# Patient Record
Sex: FEMALE | Race: WHITE | NOT HISPANIC OR LATINO | Employment: OTHER | URBAN - METROPOLITAN AREA
[De-identification: names, ages, dates, MRNs, and addresses within clinical notes are randomized per-mention and may not be internally consistent; named-entity substitution may affect disease eponyms.]

---

## 2017-04-04 ENCOUNTER — HOSPITAL ENCOUNTER (OUTPATIENT)
Dept: RADIOLOGY | Facility: MEDICAL CENTER | Age: 72
End: 2017-04-04
Attending: NEUROLOGICAL SURGERY
Payer: MEDICARE

## 2017-04-04 VITALS
OXYGEN SATURATION: 97 % | HEART RATE: 62 BPM | RESPIRATION RATE: 16 BRPM | DIASTOLIC BLOOD PRESSURE: 65 MMHG | HEIGHT: 66 IN | WEIGHT: 245 LBS | SYSTOLIC BLOOD PRESSURE: 145 MMHG | TEMPERATURE: 97.7 F | BODY MASS INDEX: 39.37 KG/M2

## 2017-04-04 DIAGNOSIS — M54.16 LUMBAR RADICULOPATHY: ICD-10-CM

## 2017-04-04 LAB — GLUCOSE BLD-MCNC: 121 MG/DL (ref 65–99)

## 2017-04-04 PROCEDURE — 72158 MRI LUMBAR SPINE W/O & W/DYE: CPT

## 2017-04-04 PROCEDURE — 82962 GLUCOSE BLOOD TEST: CPT

## 2017-04-04 PROCEDURE — 72110 X-RAY EXAM L-2 SPINE 4/>VWS: CPT

## 2017-04-04 PROCEDURE — 700101 HCHG RX REV CODE 250

## 2017-04-04 PROCEDURE — A9577 INJ MULTIHANCE: HCPCS | Performed by: NEUROLOGICAL SURGERY

## 2017-04-04 PROCEDURE — 700117 HCHG RX CONTRAST REV CODE 255: Performed by: NEUROLOGICAL SURGERY

## 2017-04-04 PROCEDURE — 700111 HCHG RX REV CODE 636 W/ 250 OVERRIDE (IP)

## 2017-04-04 RX ADMIN — GADOBENATE DIMEGLUMINE 10 ML: 529 INJECTION, SOLUTION INTRAVENOUS at 09:45

## 2017-04-04 ASSESSMENT — PAIN SCALES - GENERAL
PAINLEVEL_OUTOF10: 0

## 2017-04-04 NOTE — IP AVS SNAPSHOT
" <p align=\"LEFT\"><IMG SRC=\"[email or web address]\" alt=\"Image\" WIDTH=\"50%\" HEIGHT=\"200\" BORDER=\"\"></p>      `           Germaine Chaparro   MRN: [id number]    Department:  [address]   Date of Visit:              `  Discharge Instructions       MRI ADULT DISCHARGE INSTRUCTIONS    You have been medicated today for your scan. Please follow the instructions below to ensure your safe recovery. If you have any questions or problems, feel free to call us at [phone] or [phone].     1.   Have someone stay with you to assist you as needed.    2.   Do not drive or operate any mechanical devices.    3.   Do not perform any activity that requires concentration. Make no major decisions over the next 24 hours.     4.   Be careful changing positions from laying down to sitting or standing, as you may become dizzy.     5.   Do not drink alcohol for 48 hours.    6.   There are no restrictions for eating your normal meals. Drink fluids.    7.   You may continue your usual medications for pain, tranquilizers, muscle relaxants or sedatives when awake.     8.   Tomorrow, you may continue your normal daily activities.     9.   Pressure dressing on 10 - 15 minutes. If swelling or bleeding occurs when removed, continue placing direct pressure on injection site for another 5 minutes, or until bleeding stops.     I have been informed of and understand the above discharge instructions.     Midazolam (VERSED)  What is this medicine?  You were given MIDAZOLAM (CINTHYA parker) for your procedure today. This medication is a benzodiazepine. It is used to cause relaxation or sleep before surgery and to block the memory of the procedure.  This medicine may be used for other purposes; ask your health care provider or pharmacist if you have questions.  What side effects may I notice from receiving this medicine?  Side effects that you should report to your doctor or health care professional as soon as " possible:  • allergic reactions like skin rash, itching or hives, swelling of the face, lips, or tongue  • breathing problems  • confusion  • dizziness or lightheadedness  • fast, irregular heartbeat  • halluninations during recovery  • numbness or tingling in the hands or feet  • pain, redness, or swelling at site where injected  • seizures  Side effects that usually do not require medical attention (report to your doctor or health care professional if they continue or are bothersome):  • coughing  • headache  • hiccups  • involuntary eye and muscle movements  • loss of memory of events just before, during, and after use  • nausea, vomiting  • speech problems  • tiredness  • trouble sleeping or nightmares  This list may not describe all possible side effects. Call your doctor for medical advice about side effects. You may report side effects to FDA at 4-217-FDA-9620.    Fentanyl  What is this medicine?  You were given FENTANYL (FEN ta nil) for your procedure today, it is a pain reliever. It is used to treat breakthrough pain that your long acting pain medicine does not control. Do not use this medicine for a pain that will go away in a few days like pain from surgery, doctor or dentist visits.   This medicine may be used for other purposes; ask your health care provider or pharmacist if you have questions.  What side effects may I notice from receiving this medicine?  Side effects that you should report to your doctor or health care professional as soon as possible:  • allergic reactions like skin rash, itching or hives, swelling of the face, lips, or tongue  • breathing problems  • changes in vision  • confusion  • dry mouth  • feeling faint, lightheaded  • hallucination  • irregular heartbeat  • mouth pain, sores  • problems with balance, talking, walking  • trouble passing urine or change in the amount of urine  • unusual bleeding or bruising  • unusually weak or tired  Side effects that usually do not require  medical attention (report to your doctor or health care professional if they continue or are bothersome):  • dizzy  • headache  • loss of appetite  • nausea, vomiting  • sweating  • tingling in mouth  This list may not describe all possible side effects. Call your doctor for medical advice about side effects. You may report side effects to FDA at 5-233-PVX-9982.       `       Diet / Nutrition:    Follow any diet instructions given to you by your doctor or the dietician, including how much salt (sodium) you are allowed each day.    If you are overweight, talk to your doctor about a weight reduction plan.    Activity:    Remain physically active following your doctor's instructions about exercise and activity.    Rest often.     Any time you become even a little tired or short of breath, SIT DOWN and rest.    Worsening Symptoms:    Report any of the following signs and symptoms to the doctor's office immediately:    *Pain of jaw, arm, or neck  *Chest pain not relieved by medication                               *Dizziness or loss of consciousness  *Difficulty breathing even when at rest   *More tired than usual                                       *Bleeding drainage or swelling of surgical site  *Swelling of feet, ankles, legs or stomach                 *Fever (>100ºF)  *Pink or blood tinged sputum  *Weight gain (3lbs/day or 5lbs /week)           *Shock from internal defibrillator (if applicable)  *Palpitations or irregular heartbeats                *Cool and/or numb extremities    Stroke Awareness    Common Risk Factors for Stroke include:    Age  Atrial Fibrillation  Carotid Artery Stenosis  Diabetes Mellitus  Excessive alcohol consumption  High blood pressure  Overweight   Physical inactivity  Smoking    Warning signs and symptoms of a stroke include:    *Sudden numbness or weakness of the face, arm or leg (especially on one side of the body).  *Sudden confusion, trouble speaking or understanding.  *Sudden trouble  seeing in one or both eyes.  *Sudden trouble walking, dizziness, loss of balance or coordination.Sudden severe headache with no known cause.    It is very important to get treatment quickly when a stroke occurs. If you experience any of the above warning signs, call 911 immediately.                    `     Quit Smoking / Tobacco Use:    I understand the use of any tobacco products increases my chance of suffering from future heart disease or stroke and could cause other illnesses which may shorten my life. Quitting the use of tobacco products is the single most important thing I can do to improve my health. For further information on smoking / tobacco cessation call a Toll Free Quit Line at 1-999.300.1497 (*[employer] [place]) or 1-362.787.6298 ([employer]) or you can access the web based program at www.lunggriddig.org.    [place] Tobacco Users Help Line:  (472) 178-7976       Toll Free: 1-585.894.1659  Quit Tobacco Program [facility] (581)232-8553    Crisis Hotline:    [place] Crisis Hotline:  6-261-ZLDHCQV or 1-703.897.8393    [place] Crisis Hotline:    1-357.372.3977 or 019-268-5887    Discharge Survey:   Thank you for choosing [facility]. We hope we did everything we could to make your hospital stay a pleasant one. You may be receiving a phone survey and we would appreciate your time and participation in answering the questions. Your input is very valuable to us in our efforts to improve our service to our patients and their families.        My signature on this form indicates that:    1. I have reviewed and understand the above information.  2. My questions regarding this information have been answered to my satisfaction.  3. I have formulated a plan with my discharge nurse to obtain my prescribed medications for home.                   `           Patient or Caregiver Signature:  ____________________________________________________________    Date:   ____________________________________________________________       `

## 2017-04-04 NOTE — DISCHARGE INSTRUCTIONS
MRI ADULT DISCHARGE INSTRUCTIONS    You have been medicated today for your scan. Please follow the instructions below to ensure your safe recovery. If you have any questions or problems, feel free to call us at [phone] or [phone].     1.   Have someone stay with you to assist you as needed.    2.   Do not drive or operate any mechanical devices.    3.   Do not perform any activity that requires concentration. Make no major decisions over the next 24 hours.     4.   Be careful changing positions from laying down to sitting or standing, as you may become dizzy.     5.   Do not drink alcohol for 48 hours.    6.   There are no restrictions for eating your normal meals. Drink fluids.    7.   You may continue your usual medications for pain, tranquilizers, muscle relaxants or sedatives when awake.     8.   Tomorrow, you may continue your normal daily activities.     9.   Pressure dressing on 10 - 15 minutes. If swelling or bleeding occurs when removed, continue placing direct pressure on injection site for another 5 minutes, or until bleeding stops.     I have been informed of and understand the above discharge instructions.     Midazolam (VERSED)  What is this medicine?  You were given MIDAZOLAM (CINTHYA parker) for your procedure today. This medication is a benzodiazepine. It is used to cause relaxation or sleep before surgery and to block the memory of the procedure.  This medicine may be used for other purposes; ask your health care provider or pharmacist if you have questions.  What side effects may I notice from receiving this medicine?  Side effects that you should report to your doctor or health care professional as soon as possible:  • allergic reactions like skin rash, itching or hives, swelling of the face, lips, or tongue  • breathing problems  • confusion  • dizziness or lightheadedness  • fast, irregular heartbeat  • halluninations during recovery  • numbness or tingling in the hands or feet  • pain,  redness, or swelling at site where injected  • seizures  Side effects that usually do not require medical attention (report to your doctor or health care professional if they continue or are bothersome):  • coughing  • headache  • hiccups  • involuntary eye and muscle movements  • loss of memory of events just before, during, and after use  • nausea, vomiting  • speech problems  • tiredness  • trouble sleeping or nightmares  This list may not describe all possible side effects. Call your doctor for medical advice about side effects. You may report side effects to FDA at 0-235-HYQ-2011.    Fentanyl  What is this medicine?  You were given FENTANYL (FEN ta nil) for your procedure today, it is a pain reliever. It is used to treat breakthrough pain that your long acting pain medicine does not control. Do not use this medicine for a pain that will go away in a few days like pain from surgery, doctor or dentist visits.   This medicine may be used for other purposes; ask your health care provider or pharmacist if you have questions.  What side effects may I notice from receiving this medicine?  Side effects that you should report to your doctor or health care professional as soon as possible:  • allergic reactions like skin rash, itching or hives, swelling of the face, lips, or tongue  • breathing problems  • changes in vision  • confusion  • dry mouth  • feeling faint, lightheaded  • hallucination  • irregular heartbeat  • mouth pain, sores  • problems with balance, talking, walking  • trouble passing urine or change in the amount of urine  • unusual bleeding or bruising  • unusually weak or tired  Side effects that usually do not require medical attention (report to your doctor or health care professional if they continue or are bothersome):  • dizzy  • headache  • loss of appetite  • nausea, vomiting  • sweating  • tingling in mouth  This list may not describe all possible side effects. Call your doctor for medical  advice about side effects. You may report side effects to FDA at 8-509-EVZ-0051.

## 2018-09-27 ENCOUNTER — APPOINTMENT (RX ONLY)
Dept: URBAN - NONMETROPOLITAN AREA CLINIC 1 | Facility: CLINIC | Age: 73
Setting detail: DERMATOLOGY
End: 2018-09-27

## 2018-09-27 DIAGNOSIS — L82.1 OTHER SEBORRHEIC KERATOSIS: ICD-10-CM

## 2018-09-27 DIAGNOSIS — L82.0 INFLAMED SEBORRHEIC KERATOSIS: ICD-10-CM

## 2018-09-27 DIAGNOSIS — Z85.828 PERSONAL HISTORY OF OTHER MALIGNANT NEOPLASM OF SKIN: ICD-10-CM

## 2018-09-27 PROBLEM — E78.5 HYPERLIPIDEMIA, UNSPECIFIED: Status: ACTIVE | Noted: 2018-09-27

## 2018-09-27 PROBLEM — E13.9 OTHER SPECIFIED DIABETES MELLITUS WITHOUT COMPLICATIONS: Status: ACTIVE | Noted: 2018-09-27

## 2018-09-27 PROBLEM — L70.0 ACNE VULGARIS: Status: ACTIVE | Noted: 2018-09-27

## 2018-09-27 PROBLEM — M12.9 ARTHROPATHY, UNSPECIFIED: Status: ACTIVE | Noted: 2018-09-27

## 2018-09-27 PROBLEM — L55.1 SUNBURN OF SECOND DEGREE: Status: ACTIVE | Noted: 2018-09-27

## 2018-09-27 PROBLEM — J45.909 UNSPECIFIED ASTHMA, UNCOMPLICATED: Status: ACTIVE | Noted: 2018-09-27

## 2018-09-27 PROBLEM — H91.90 UNSPECIFIED HEARING LOSS, UNSPECIFIED EAR: Status: ACTIVE | Noted: 2018-09-27

## 2018-09-27 PROBLEM — L29.8 OTHER PRURITUS: Status: ACTIVE | Noted: 2018-09-27

## 2018-09-27 PROBLEM — I10 ESSENTIAL (PRIMARY) HYPERTENSION: Status: ACTIVE | Noted: 2018-09-27

## 2018-09-27 PROCEDURE — ? COUNSELING

## 2018-09-27 PROCEDURE — 99202 OFFICE O/P NEW SF 15 MIN: CPT | Mod: 25

## 2018-09-27 PROCEDURE — ? LIQUID NITROGEN

## 2018-09-27 PROCEDURE — ? VITAMIN B3 COUNSELING

## 2018-09-27 PROCEDURE — 17110 DESTRUCTION B9 LES UP TO 14: CPT

## 2018-09-27 ASSESSMENT — LOCATION DETAILED DESCRIPTION DERM
LOCATION DETAILED: NASAL SUPRATIP
LOCATION DETAILED: INFERIOR THORACIC SPINE
LOCATION DETAILED: RIGHT MID-UPPER BACK
LOCATION DETAILED: LEFT MEDIAL UPPER BACK
LOCATION DETAILED: EPIGASTRIC SKIN
LOCATION DETAILED: LEFT INFERIOR MEDIAL UPPER BACK
LOCATION DETAILED: LEFT SUPERIOR MEDIAL UPPER BACK
LOCATION DETAILED: LEFT SUPERIOR UPPER BACK
LOCATION DETAILED: LEFT MID-UPPER BACK
LOCATION DETAILED: RIGHT INFERIOR UPPER BACK

## 2018-09-27 ASSESSMENT — LOCATION SIMPLE DESCRIPTION DERM
LOCATION SIMPLE: NOSE
LOCATION SIMPLE: LEFT UPPER BACK
LOCATION SIMPLE: ABDOMEN
LOCATION SIMPLE: RIGHT UPPER BACK
LOCATION SIMPLE: UPPER BACK

## 2018-09-27 ASSESSMENT — LOCATION ZONE DERM
LOCATION ZONE: NOSE
LOCATION ZONE: TRUNK

## 2018-09-27 NOTE — PROCEDURE: VITAMIN B3 COUNSELING
Detail Level: Zone
Counseling Text: I recommended taking nicotinamide or niacinamide, also know as vitamin B3, twice daily. Recent evidence suggests that taking vitamin B3 (500 mg twice daily) can reduce the risk of actinic keratoses and non-melanoma skin cancers. Side effects of vitamin B3 include flushing and headache.

## 2018-09-27 NOTE — PROCEDURE: LIQUID NITROGEN
Number Of Freeze-Thaw Cycles: 2 freeze-thaw cycles
Add 52 Modifier (Optional): no
Medical Necessity Information: It is in your best interest to select a reason for this procedure from the list below. All of these items fulfill various CMS LCD requirements except the new and changing color options.
Detail Level: Simple
Post-Care Instructions: I reviewed with the patient in detail post-care instructions. Patient is to wear sunprotection, and avoid picking at any of the treated lesions. Pt may apply Vaseline to crusted or scabbing areas.
Include Z78.9 (Other Specified Conditions Influencing Health Status) As An Associated Diagnosis?: Yes
Consent: The patient's consent was obtained including but not limited to risks of crusting, scabbing, blistering, scarring, darker or lighter pigmentary change, recurrence, incomplete removal and infection.
Medical Necessity Clause: This procedure was medically necessary because the lesions that were treated were:

## 2018-10-03 ENCOUNTER — HOSPITAL ENCOUNTER (OUTPATIENT)
Dept: RADIOLOGY | Facility: MEDICAL CENTER | Age: 73
End: 2018-10-03
Attending: NURSE PRACTITIONER
Payer: MEDICARE

## 2018-10-03 VITALS
BODY MASS INDEX: 41.78 KG/M2 | TEMPERATURE: 97.6 F | HEIGHT: 66 IN | RESPIRATION RATE: 16 BRPM | HEART RATE: 68 BPM | SYSTOLIC BLOOD PRESSURE: 142 MMHG | DIASTOLIC BLOOD PRESSURE: 68 MMHG | WEIGHT: 260 LBS | OXYGEN SATURATION: 94 %

## 2018-10-03 DIAGNOSIS — M54.16 LUMBAR RADICULOPATHY: ICD-10-CM

## 2018-10-03 LAB — GLUCOSE BLD-MCNC: 125 MG/DL (ref 65–99)

## 2018-10-03 PROCEDURE — 01922 ANES N-INVAS IMG/RADJ THER: CPT

## 2018-10-03 PROCEDURE — 700101 HCHG RX REV CODE 250

## 2018-10-03 PROCEDURE — 82962 GLUCOSE BLOOD TEST: CPT

## 2018-10-03 PROCEDURE — 700111 HCHG RX REV CODE 636 W/ 250 OVERRIDE (IP)

## 2018-10-03 PROCEDURE — 72148 MRI LUMBAR SPINE W/O DYE: CPT

## 2018-10-03 RX ORDER — ONDANSETRON 2 MG/ML
4 INJECTION INTRAMUSCULAR; INTRAVENOUS
Status: DISCONTINUED | OUTPATIENT
Start: 2018-10-03 | End: 2018-10-04 | Stop reason: HOSPADM

## 2018-10-03 RX ORDER — HALOPERIDOL 5 MG/ML
1 INJECTION INTRAMUSCULAR
Status: DISCONTINUED | OUTPATIENT
Start: 2018-10-03 | End: 2018-10-04 | Stop reason: HOSPADM

## 2018-10-03 RX ORDER — DIPHENHYDRAMINE HYDROCHLORIDE 50 MG/ML
12.5 INJECTION INTRAMUSCULAR; INTRAVENOUS
Status: DISCONTINUED | OUTPATIENT
Start: 2018-10-03 | End: 2018-10-04 | Stop reason: HOSPADM

## 2018-10-03 ASSESSMENT — PAIN SCALES - GENERAL
PAINLEVEL_OUTOF10: 2
PAINLEVEL_OUTOF10: 4

## 2018-12-21 ENCOUNTER — APPOINTMENT (OUTPATIENT)
Dept: ADMISSIONS | Facility: MEDICAL CENTER | Age: 73
End: 2018-12-21
Attending: INTERNAL MEDICINE
Payer: MEDICARE

## 2018-12-21 RX ORDER — GABAPENTIN 300 MG/1
600 CAPSULE ORAL
Status: ON HOLD | COMMUNITY
End: 2019-01-11

## 2018-12-21 RX ORDER — AMLODIPINE BESYLATE 5 MG/1
2.5 TABLET ORAL
Status: ON HOLD | COMMUNITY
End: 2019-01-11

## 2018-12-21 RX ORDER — CHLORAL HYDRATE 500 MG
1000 CAPSULE ORAL 2 TIMES DAILY
Status: ON HOLD | COMMUNITY
End: 2019-01-03

## 2018-12-21 RX ORDER — GABAPENTIN 100 MG/1
200 CAPSULE ORAL EVERY MORNING
Status: ON HOLD | COMMUNITY
End: 2019-01-11

## 2018-12-21 RX ORDER — DAPAGLIFLOZIN 5 MG/1
1 TABLET, FILM COATED ORAL
Status: ON HOLD | COMMUNITY
End: 2019-01-11

## 2018-12-21 RX ORDER — LEVOTHYROXINE SODIUM 175 UG/1
175 TABLET ORAL
COMMUNITY

## 2018-12-21 RX ORDER — METOPROLOL SUCCINATE 100 MG/1
200 TABLET, EXTENDED RELEASE ORAL 2 TIMES DAILY
Status: ON HOLD | COMMUNITY
End: 2022-10-03

## 2018-12-21 RX ORDER — INSULIN GLARGINE 100 [IU]/ML
30 INJECTION, SOLUTION SUBCUTANEOUS NIGHTLY
Status: ON HOLD | COMMUNITY
End: 2022-10-03

## 2018-12-21 RX ORDER — MULTIVIT WITH MINERALS/LUTEIN
1 TABLET ORAL 2 TIMES DAILY
Status: ON HOLD | COMMUNITY
End: 2019-01-03

## 2018-12-27 PROCEDURE — 302196 LINEN, HYPOALLERGENIC: Performed by: NEUROLOGICAL SURGERY

## 2018-12-28 ENCOUNTER — APPOINTMENT (OUTPATIENT)
Dept: RADIOLOGY | Facility: MEDICAL CENTER | Age: 73
DRG: 460 | End: 2018-12-28
Attending: NEUROLOGICAL SURGERY
Payer: MEDICARE

## 2018-12-28 ENCOUNTER — HOSPITAL ENCOUNTER (INPATIENT)
Facility: MEDICAL CENTER | Age: 73
LOS: 6 days | DRG: 460 | End: 2019-01-03
Attending: NEUROLOGICAL SURGERY | Admitting: NEUROLOGICAL SURGERY
Payer: MEDICARE

## 2018-12-28 DIAGNOSIS — M48.061 SPINAL STENOSIS OF LUMBAR REGION, UNSPECIFIED WHETHER NEUROGENIC CLAUDICATION PRESENT: ICD-10-CM

## 2018-12-28 LAB
EKG IMPRESSION: NORMAL
GLUCOSE BLD-MCNC: 121 MG/DL (ref 65–99)
GLUCOSE BLD-MCNC: 133 MG/DL (ref 65–99)

## 2018-12-28 PROCEDURE — 01NB0ZZ RELEASE LUMBAR NERVE, OPEN APPROACH: ICD-10-PCS | Performed by: NEUROLOGICAL SURGERY

## 2018-12-28 PROCEDURE — 93010 ELECTROCARDIOGRAM REPORT: CPT | Performed by: INTERNAL MEDICINE

## 2018-12-28 PROCEDURE — A9270 NON-COVERED ITEM OR SERVICE: HCPCS | Performed by: ANESTHESIOLOGY

## 2018-12-28 PROCEDURE — 700111 HCHG RX REV CODE 636 W/ 250 OVERRIDE (IP)

## 2018-12-28 PROCEDURE — 160036 HCHG PACU - EA ADDL 30 MINS PHASE I: Performed by: NEUROLOGICAL SURGERY

## 2018-12-28 PROCEDURE — 700101 HCHG RX REV CODE 250: Performed by: NURSE PRACTITIONER

## 2018-12-28 PROCEDURE — 700101 HCHG RX REV CODE 250

## 2018-12-28 PROCEDURE — 500885 HCHG PACK, JACKSON TABLE: Performed by: NEUROLOGICAL SURGERY

## 2018-12-28 PROCEDURE — A9270 NON-COVERED ITEM OR SERVICE: HCPCS | Performed by: NURSE PRACTITIONER

## 2018-12-28 PROCEDURE — 160031 HCHG SURGERY MINUTES - 1ST 30 MINS LEVEL 5: Performed by: NEUROLOGICAL SURGERY

## 2018-12-28 PROCEDURE — 500367 HCHG DRAIN KIT, HEMOVAC: Performed by: NEUROLOGICAL SURGERY

## 2018-12-28 PROCEDURE — 700112 HCHG RX REV CODE 229: Performed by: NURSE PRACTITIONER

## 2018-12-28 PROCEDURE — 700111 HCHG RX REV CODE 636 W/ 250 OVERRIDE (IP): Performed by: ANESTHESIOLOGY

## 2018-12-28 PROCEDURE — 95937 NEUROMUSCULAR JUNCTION TEST: CPT | Performed by: NEUROLOGICAL SURGERY

## 2018-12-28 PROCEDURE — 82962 GLUCOSE BLOOD TEST: CPT

## 2018-12-28 PROCEDURE — 110372 HCHG SHELL REV 278: Performed by: NEUROLOGICAL SURGERY

## 2018-12-28 PROCEDURE — 95940 IONM IN OPERATNG ROOM 15 MIN: CPT | Performed by: NEUROLOGICAL SURGERY

## 2018-12-28 PROCEDURE — 0SG1071 FUSION OF 2 OR MORE LUMBAR VERTEBRAL JOINTS WITH AUTOLOGOUS TISSUE SUBSTITUTE, POSTERIOR APPROACH, POSTERIOR COLUMN, OPEN APPROACH: ICD-10-PCS | Performed by: NEUROLOGICAL SURGERY

## 2018-12-28 PROCEDURE — 72100 X-RAY EXAM L-S SPINE 2/3 VWS: CPT

## 2018-12-28 PROCEDURE — 95861 NEEDLE EMG 2 EXTREMITIES: CPT | Performed by: NEUROLOGICAL SURGERY

## 2018-12-28 PROCEDURE — 160042 HCHG SURGERY MINUTES - EA ADDL 1 MIN LEVEL 5: Performed by: NEUROLOGICAL SURGERY

## 2018-12-28 PROCEDURE — 160048 HCHG OR STATISTICAL LEVEL 1-5: Performed by: NEUROLOGICAL SURGERY

## 2018-12-28 PROCEDURE — 110454 HCHG SHELL REV 250: Performed by: NEUROLOGICAL SURGERY

## 2018-12-28 PROCEDURE — 110371 HCHG SHELL REV 272: Performed by: NEUROLOGICAL SURGERY

## 2018-12-28 PROCEDURE — 93005 ELECTROCARDIOGRAM TRACING: CPT | Performed by: ANESTHESIOLOGY

## 2018-12-28 PROCEDURE — C1713 ANCHOR/SCREW BN/BN,TIS/BN: HCPCS | Performed by: NEUROLOGICAL SURGERY

## 2018-12-28 PROCEDURE — 700102 HCHG RX REV CODE 250 W/ 637 OVERRIDE(OP): Performed by: NURSE PRACTITIONER

## 2018-12-28 PROCEDURE — 160009 HCHG ANES TIME/MIN: Performed by: NEUROLOGICAL SURGERY

## 2018-12-28 PROCEDURE — 160035 HCHG PACU - 1ST 60 MINS PHASE I: Performed by: NEUROLOGICAL SURGERY

## 2018-12-28 PROCEDURE — 700102 HCHG RX REV CODE 250 W/ 637 OVERRIDE(OP): Performed by: ANESTHESIOLOGY

## 2018-12-28 PROCEDURE — 700111 HCHG RX REV CODE 636 W/ 250 OVERRIDE (IP): Performed by: NURSE PRACTITIONER

## 2018-12-28 PROCEDURE — 95938 SOMATOSENSORY TESTING: CPT | Performed by: NEUROLOGICAL SURGERY

## 2018-12-28 PROCEDURE — 160002 HCHG RECOVERY MINUTES (STAT): Performed by: NEUROLOGICAL SURGERY

## 2018-12-28 PROCEDURE — 770001 HCHG ROOM/CARE - MED/SURG/GYN PRIV*

## 2018-12-28 PROCEDURE — 501838 HCHG SUTURE GENERAL: Performed by: NEUROLOGICAL SURGERY

## 2018-12-28 DEVICE — SCREW MAS  SOLERA 6.5 X 40MM (1TCX16+3TCX8=40): Type: IMPLANTABLE DEVICE | Site: SPINE LUMBAR | Status: FUNCTIONAL

## 2018-12-28 DEVICE — ROD PREBENT TITANIUM 5.5 X 60MM (2TCONX2=4): Type: IMPLANTABLE DEVICE | Site: SPINE LUMBAR | Status: FUNCTIONAL

## 2018-12-28 DEVICE — SCREW SOLERA SET SCREW (1TCX40+3TCX21+2TCX10=123): Type: IMPLANTABLE DEVICE | Site: SPINE LUMBAR | Status: FUNCTIONAL

## 2018-12-28 DEVICE — SCREW MAS  SOLERA 6.5 X 45MM (1TCX16+3TCX8=40): Type: IMPLANTABLE DEVICE | Site: SPINE LUMBAR | Status: FUNCTIONAL

## 2018-12-28 DEVICE — SCREW MAS  SOLERA 6.5 X 50MM (1TCX16+3TCX8=40): Type: IMPLANTABLE DEVICE | Site: SPINE LUMBAR | Status: FUNCTIONAL

## 2018-12-28 DEVICE — BONE CHIPS CANC 4-10MM 30CC - CRUSHED  FREEZE DRIED ONLY: Type: IMPLANTABLE DEVICE | Site: SPINE LUMBAR | Status: FUNCTIONAL

## 2018-12-28 RX ORDER — DIPHENHYDRAMINE HYDROCHLORIDE 50 MG/ML
12.5 INJECTION INTRAMUSCULAR; INTRAVENOUS
Status: DISCONTINUED | OUTPATIENT
Start: 2018-12-28 | End: 2018-12-28 | Stop reason: HOSPADM

## 2018-12-28 RX ORDER — CEFAZOLIN SODIUM 1 G/3ML
INJECTION, POWDER, FOR SOLUTION INTRAMUSCULAR; INTRAVENOUS
Status: DISCONTINUED | OUTPATIENT
Start: 2018-12-28 | End: 2018-12-28 | Stop reason: HOSPADM

## 2018-12-28 RX ORDER — SODIUM CHLORIDE AND POTASSIUM CHLORIDE 150; 900 MG/100ML; MG/100ML
INJECTION, SOLUTION INTRAVENOUS CONTINUOUS
Status: DISCONTINUED | OUTPATIENT
Start: 2018-12-28 | End: 2019-01-03 | Stop reason: HOSPADM

## 2018-12-28 RX ORDER — BISACODYL 10 MG
10 SUPPOSITORY, RECTAL RECTAL
Status: DISCONTINUED | OUTPATIENT
Start: 2018-12-28 | End: 2019-01-03 | Stop reason: HOSPADM

## 2018-12-28 RX ORDER — DIPHENHYDRAMINE HCL 25 MG
25 TABLET ORAL EVERY 6 HOURS PRN
Status: DISCONTINUED | OUTPATIENT
Start: 2018-12-28 | End: 2019-01-03 | Stop reason: HOSPADM

## 2018-12-28 RX ORDER — HYDROMORPHONE HYDROCHLORIDE 1 MG/ML
0.1 INJECTION, SOLUTION INTRAMUSCULAR; INTRAVENOUS; SUBCUTANEOUS
Status: DISCONTINUED | OUTPATIENT
Start: 2018-12-28 | End: 2018-12-28 | Stop reason: HOSPADM

## 2018-12-28 RX ORDER — DIPHENHYDRAMINE HYDROCHLORIDE 50 MG/ML
25 INJECTION INTRAMUSCULAR; INTRAVENOUS EVERY 6 HOURS PRN
Status: DISCONTINUED | OUTPATIENT
Start: 2018-12-28 | End: 2019-01-03 | Stop reason: HOSPADM

## 2018-12-28 RX ORDER — DOCUSATE SODIUM 100 MG/1
100 CAPSULE, LIQUID FILLED ORAL 2 TIMES DAILY
Status: DISCONTINUED | OUTPATIENT
Start: 2018-12-28 | End: 2019-01-03 | Stop reason: HOSPADM

## 2018-12-28 RX ORDER — HEPARIN SODIUM,PORCINE 1000/ML
VIAL (ML) INJECTION
Status: DISCONTINUED | OUTPATIENT
Start: 2018-12-28 | End: 2018-12-28 | Stop reason: HOSPADM

## 2018-12-28 RX ORDER — ONDANSETRON 2 MG/ML
4 INJECTION INTRAMUSCULAR; INTRAVENOUS
Status: DISCONTINUED | OUTPATIENT
Start: 2018-12-28 | End: 2018-12-28 | Stop reason: HOSPADM

## 2018-12-28 RX ORDER — OXYCODONE HCL 5 MG/5 ML
5 SOLUTION, ORAL ORAL
Status: DISCONTINUED | OUTPATIENT
Start: 2018-12-28 | End: 2018-12-28 | Stop reason: HOSPADM

## 2018-12-28 RX ORDER — OXYCODONE HCL 10 MG/1
10 TABLET, FILM COATED, EXTENDED RELEASE ORAL ONCE
Status: COMPLETED | OUTPATIENT
Start: 2018-12-28 | End: 2018-12-28

## 2018-12-28 RX ORDER — POLYETHYLENE GLYCOL 3350 17 G/17G
1 POWDER, FOR SOLUTION ORAL 2 TIMES DAILY PRN
Status: DISCONTINUED | OUTPATIENT
Start: 2018-12-28 | End: 2019-01-03 | Stop reason: HOSPADM

## 2018-12-28 RX ORDER — HYDRALAZINE HYDROCHLORIDE 20 MG/ML
10 INJECTION INTRAMUSCULAR; INTRAVENOUS
Status: DISCONTINUED | OUTPATIENT
Start: 2018-12-28 | End: 2019-01-03 | Stop reason: HOSPADM

## 2018-12-28 RX ORDER — CALCIUM CARBONATE 500 MG/1
500 TABLET, CHEWABLE ORAL 2 TIMES DAILY
Status: DISCONTINUED | OUTPATIENT
Start: 2018-12-28 | End: 2019-01-03 | Stop reason: HOSPADM

## 2018-12-28 RX ORDER — LIDOCAINE HYDROCHLORIDE 10 MG/ML
INJECTION, SOLUTION INFILTRATION; PERINEURAL
Status: COMPLETED
Start: 2018-12-28 | End: 2018-12-28

## 2018-12-28 RX ORDER — TIZANIDINE 4 MG/1
2 TABLET ORAL 3 TIMES DAILY PRN
Status: DISCONTINUED | OUTPATIENT
Start: 2018-12-28 | End: 2019-01-03 | Stop reason: HOSPADM

## 2018-12-28 RX ORDER — SODIUM CHLORIDE, SODIUM LACTATE, POTASSIUM CHLORIDE, CALCIUM CHLORIDE 600; 310; 30; 20 MG/100ML; MG/100ML; MG/100ML; MG/100ML
INJECTION, SOLUTION INTRAVENOUS CONTINUOUS
Status: DISCONTINUED | OUTPATIENT
Start: 2018-12-28 | End: 2018-12-28 | Stop reason: HOSPADM

## 2018-12-28 RX ORDER — SODIUM CHLORIDE, SODIUM LACTATE, POTASSIUM CHLORIDE, CALCIUM CHLORIDE 600; 310; 30; 20 MG/100ML; MG/100ML; MG/100ML; MG/100ML
INJECTION, SOLUTION INTRAVENOUS ONCE
Status: COMPLETED | OUTPATIENT
Start: 2018-12-28 | End: 2018-12-28

## 2018-12-28 RX ORDER — BUPIVACAINE HYDROCHLORIDE AND EPINEPHRINE 5; 5 MG/ML; UG/ML
INJECTION, SOLUTION EPIDURAL; INTRACAUDAL; PERINEURAL
Status: DISCONTINUED | OUTPATIENT
Start: 2018-12-28 | End: 2018-12-28 | Stop reason: HOSPADM

## 2018-12-28 RX ORDER — ACETAMINOPHEN 500 MG
1000 TABLET ORAL ONCE
Status: COMPLETED | OUTPATIENT
Start: 2018-12-28 | End: 2018-12-28

## 2018-12-28 RX ORDER — AMOXICILLIN 250 MG
1 CAPSULE ORAL NIGHTLY
Status: DISCONTINUED | OUTPATIENT
Start: 2018-12-28 | End: 2019-01-03 | Stop reason: HOSPADM

## 2018-12-28 RX ORDER — ONDANSETRON 2 MG/ML
4 INJECTION INTRAMUSCULAR; INTRAVENOUS EVERY 4 HOURS PRN
Status: DISCONTINUED | OUTPATIENT
Start: 2018-12-28 | End: 2019-01-03 | Stop reason: HOSPADM

## 2018-12-28 RX ORDER — ONDANSETRON 4 MG/1
4 TABLET, ORALLY DISINTEGRATING ORAL EVERY 4 HOURS PRN
Status: DISCONTINUED | OUTPATIENT
Start: 2018-12-28 | End: 2019-01-03 | Stop reason: HOSPADM

## 2018-12-28 RX ORDER — HALOPERIDOL 5 MG/ML
1 INJECTION INTRAMUSCULAR
Status: DISCONTINUED | OUTPATIENT
Start: 2018-12-28 | End: 2018-12-28 | Stop reason: HOSPADM

## 2018-12-28 RX ORDER — HYDROMORPHONE HYDROCHLORIDE 1 MG/ML
0.2 INJECTION, SOLUTION INTRAMUSCULAR; INTRAVENOUS; SUBCUTANEOUS
Status: DISCONTINUED | OUTPATIENT
Start: 2018-12-28 | End: 2018-12-28 | Stop reason: HOSPADM

## 2018-12-28 RX ORDER — GABAPENTIN 300 MG/1
300 CAPSULE ORAL ONCE
Status: DISCONTINUED | OUTPATIENT
Start: 2018-12-28 | End: 2018-12-28 | Stop reason: HOSPADM

## 2018-12-28 RX ORDER — CEFAZOLIN SODIUM 2 G/100ML
2 INJECTION, SOLUTION INTRAVENOUS EVERY 8 HOURS
Status: COMPLETED | OUTPATIENT
Start: 2018-12-28 | End: 2018-12-29

## 2018-12-28 RX ORDER — MEPERIDINE HYDROCHLORIDE 25 MG/ML
6.25 INJECTION INTRAMUSCULAR; INTRAVENOUS; SUBCUTANEOUS
Status: DISCONTINUED | OUTPATIENT
Start: 2018-12-28 | End: 2018-12-28 | Stop reason: HOSPADM

## 2018-12-28 RX ORDER — AMOXICILLIN 250 MG
1 CAPSULE ORAL
Status: DISCONTINUED | OUTPATIENT
Start: 2018-12-28 | End: 2019-01-03 | Stop reason: HOSPADM

## 2018-12-28 RX ORDER — ENEMA 19; 7 G/133ML; G/133ML
1 ENEMA RECTAL
Status: DISCONTINUED | OUTPATIENT
Start: 2018-12-28 | End: 2019-01-03 | Stop reason: HOSPADM

## 2018-12-28 RX ADMIN — DOCUSATE SODIUM 100 MG: 100 CAPSULE, LIQUID FILLED ORAL at 21:48

## 2018-12-28 RX ADMIN — Medication: at 18:10

## 2018-12-28 RX ADMIN — FENTANYL CITRATE 25 MCG: 50 INJECTION, SOLUTION INTRAMUSCULAR; INTRAVENOUS at 17:20

## 2018-12-28 RX ADMIN — FENTANYL CITRATE 25 MCG: 50 INJECTION, SOLUTION INTRAMUSCULAR; INTRAVENOUS at 18:01

## 2018-12-28 RX ADMIN — SODIUM CHLORIDE, SODIUM LACTATE, POTASSIUM CHLORIDE, CALCIUM CHLORIDE: 600; 310; 30; 20 INJECTION, SOLUTION INTRAVENOUS at 12:26

## 2018-12-28 RX ADMIN — STANDARDIZED SENNA CONCENTRATE AND DOCUSATE SODIUM 1 TABLET: 8.6; 5 TABLET, FILM COATED ORAL at 21:47

## 2018-12-28 RX ADMIN — FENTANYL CITRATE 25 MCG: 50 INJECTION, SOLUTION INTRAMUSCULAR; INTRAVENOUS at 17:31

## 2018-12-28 RX ADMIN — ACETAMINOPHEN 1000 MG: 500 TABLET, FILM COATED ORAL at 12:19

## 2018-12-28 RX ADMIN — POTASSIUM CHLORIDE AND SODIUM CHLORIDE: 900; 150 INJECTION, SOLUTION INTRAVENOUS at 21:45

## 2018-12-28 RX ADMIN — FENTANYL CITRATE 25 MCG: 50 INJECTION, SOLUTION INTRAMUSCULAR; INTRAVENOUS at 17:52

## 2018-12-28 RX ADMIN — ANTACID TABLETS 500 MG: 500 TABLET, CHEWABLE ORAL at 21:47

## 2018-12-28 RX ADMIN — CEFAZOLIN SODIUM 2 G: 2 INJECTION, SOLUTION INTRAVENOUS at 21:47

## 2018-12-28 RX ADMIN — OXYCODONE HYDROCHLORIDE 10 MG: 10 TABLET, FILM COATED, EXTENDED RELEASE ORAL at 12:19

## 2018-12-28 ASSESSMENT — PAIN SCALES - GENERAL
PAINLEVEL_OUTOF10: 7
PAINLEVEL_OUTOF10: 7
PAINLEVEL_OUTOF10: 6
PAINLEVEL_OUTOF10: 7
PAINLEVEL_OUTOF10: 8
PAINLEVEL_OUTOF10: 8
PAINLEVEL_OUTOF10: 6

## 2018-12-29 LAB
ANION GAP SERPL CALC-SCNC: 8 MMOL/L (ref 0–11.9)
BUN SERPL-MCNC: 16 MG/DL (ref 8–22)
CALCIUM SERPL-MCNC: 9.1 MG/DL (ref 8.5–10.5)
CHLORIDE SERPL-SCNC: 106 MMOL/L (ref 96–112)
CO2 SERPL-SCNC: 27 MMOL/L (ref 20–33)
CREAT SERPL-MCNC: 0.94 MG/DL (ref 0.5–1.4)
ERYTHROCYTE [DISTWIDTH] IN BLOOD BY AUTOMATED COUNT: 46.7 FL (ref 35.9–50)
GLUCOSE BLD-MCNC: 130 MG/DL (ref 65–99)
GLUCOSE BLD-MCNC: 155 MG/DL (ref 65–99)
GLUCOSE BLD-MCNC: 197 MG/DL (ref 65–99)
GLUCOSE SERPL-MCNC: 139 MG/DL (ref 65–99)
HCT VFR BLD AUTO: 41.9 % (ref 37–47)
HGB BLD-MCNC: 13 G/DL (ref 12–16)
MCH RBC QN AUTO: 30.7 PG (ref 27–33)
MCHC RBC AUTO-ENTMCNC: 31 G/DL (ref 33.6–35)
MCV RBC AUTO: 99.1 FL (ref 81.4–97.8)
PLATELET # BLD AUTO: 155 K/UL (ref 164–446)
PMV BLD AUTO: 11.5 FL (ref 9–12.9)
POTASSIUM SERPL-SCNC: 5.2 MMOL/L (ref 3.6–5.5)
RBC # BLD AUTO: 4.23 M/UL (ref 4.2–5.4)
SODIUM SERPL-SCNC: 141 MMOL/L (ref 135–145)
WBC # BLD AUTO: 11.8 K/UL (ref 4.8–10.8)

## 2018-12-29 PROCEDURE — 97535 SELF CARE MNGMENT TRAINING: CPT

## 2018-12-29 PROCEDURE — 82962 GLUCOSE BLOOD TEST: CPT

## 2018-12-29 PROCEDURE — G8987 SELF CARE CURRENT STATUS: HCPCS | Mod: CK

## 2018-12-29 PROCEDURE — G8979 MOBILITY GOAL STATUS: HCPCS | Mod: CJ

## 2018-12-29 PROCEDURE — 97166 OT EVAL MOD COMPLEX 45 MIN: CPT

## 2018-12-29 PROCEDURE — 80048 BASIC METABOLIC PNL TOTAL CA: CPT

## 2018-12-29 PROCEDURE — 700112 HCHG RX REV CODE 229: Performed by: NURSE PRACTITIONER

## 2018-12-29 PROCEDURE — 97162 PT EVAL MOD COMPLEX 30 MIN: CPT

## 2018-12-29 PROCEDURE — 36415 COLL VENOUS BLD VENIPUNCTURE: CPT

## 2018-12-29 PROCEDURE — 700111 HCHG RX REV CODE 636 W/ 250 OVERRIDE (IP): Performed by: NURSE PRACTITIONER

## 2018-12-29 PROCEDURE — 770001 HCHG ROOM/CARE - MED/SURG/GYN PRIV*

## 2018-12-29 PROCEDURE — 85027 COMPLETE CBC AUTOMATED: CPT

## 2018-12-29 PROCEDURE — A9270 NON-COVERED ITEM OR SERVICE: HCPCS | Performed by: NURSE PRACTITIONER

## 2018-12-29 PROCEDURE — 700102 HCHG RX REV CODE 250 W/ 637 OVERRIDE(OP): Performed by: NURSE PRACTITIONER

## 2018-12-29 PROCEDURE — G8988 SELF CARE GOAL STATUS: HCPCS | Mod: CI

## 2018-12-29 PROCEDURE — G8978 MOBILITY CURRENT STATUS: HCPCS | Mod: CL

## 2018-12-29 PROCEDURE — 700101 HCHG RX REV CODE 250: Performed by: NURSE PRACTITIONER

## 2018-12-29 RX ORDER — HYDROMORPHONE HYDROCHLORIDE 2 MG/1
2 TABLET ORAL
Status: DISCONTINUED | OUTPATIENT
Start: 2018-12-29 | End: 2019-01-03 | Stop reason: HOSPADM

## 2018-12-29 RX ORDER — DAPAGLIFLOZIN 5 MG/1
1 TABLET, FILM COATED ORAL
Status: DISCONTINUED | OUTPATIENT
Start: 2018-12-29 | End: 2018-12-29

## 2018-12-29 RX ORDER — METOPROLOL SUCCINATE 100 MG/1
200 TABLET, EXTENDED RELEASE ORAL 2 TIMES DAILY
Status: DISCONTINUED | OUTPATIENT
Start: 2018-12-29 | End: 2019-01-03 | Stop reason: HOSPADM

## 2018-12-29 RX ORDER — OXYCODONE HYDROCHLORIDE AND ACETAMINOPHEN 5; 325 MG/1; MG/1
1-2 TABLET ORAL EVERY 4 HOURS PRN
Status: DISCONTINUED | OUTPATIENT
Start: 2018-12-29 | End: 2019-01-03 | Stop reason: HOSPADM

## 2018-12-29 RX ORDER — AMITRIPTYLINE HYDROCHLORIDE 10 MG/1
20 TABLET, FILM COATED ORAL
Status: DISCONTINUED | OUTPATIENT
Start: 2018-12-29 | End: 2019-01-03 | Stop reason: HOSPADM

## 2018-12-29 RX ORDER — BUDESONIDE AND FORMOTEROL FUMARATE DIHYDRATE 80; 4.5 UG/1; UG/1
2 AEROSOL RESPIRATORY (INHALATION)
Status: DISCONTINUED | OUTPATIENT
Start: 2018-12-29 | End: 2018-12-29

## 2018-12-29 RX ORDER — GABAPENTIN 300 MG/1
600 CAPSULE ORAL
Status: DISCONTINUED | OUTPATIENT
Start: 2018-12-29 | End: 2019-01-03 | Stop reason: HOSPADM

## 2018-12-29 RX ORDER — AMLODIPINE BESYLATE 2.5 MG/1
2.5 TABLET ORAL
Status: DISCONTINUED | OUTPATIENT
Start: 2018-12-29 | End: 2019-01-03 | Stop reason: HOSPADM

## 2018-12-29 RX ORDER — INSULIN GLARGINE 100 [IU]/ML
30 INJECTION, SOLUTION SUBCUTANEOUS NIGHTLY
Status: DISCONTINUED | OUTPATIENT
Start: 2018-12-29 | End: 2019-01-03 | Stop reason: HOSPADM

## 2018-12-29 RX ORDER — ATORVASTATIN CALCIUM 40 MG/1
20 TABLET, FILM COATED ORAL
Status: DISCONTINUED | OUTPATIENT
Start: 2018-12-29 | End: 2019-01-03 | Stop reason: HOSPADM

## 2018-12-29 RX ORDER — BUDESONIDE AND FORMOTEROL FUMARATE DIHYDRATE 80; 4.5 UG/1; UG/1
2 AEROSOL RESPIRATORY (INHALATION) 2 TIMES DAILY
Status: DISCONTINUED | OUTPATIENT
Start: 2018-12-29 | End: 2019-01-03 | Stop reason: HOSPADM

## 2018-12-29 RX ORDER — GABAPENTIN 100 MG/1
200 CAPSULE ORAL EVERY MORNING
Status: DISCONTINUED | OUTPATIENT
Start: 2018-12-29 | End: 2019-01-03 | Stop reason: HOSPADM

## 2018-12-29 RX ADMIN — OXYCODONE AND ACETAMINOPHEN 1 TABLET: 5; 325 TABLET ORAL at 21:41

## 2018-12-29 RX ADMIN — AMITRIPTYLINE HYDROCHLORIDE 20 MG: 10 TABLET, FILM COATED ORAL at 01:23

## 2018-12-29 RX ADMIN — GABAPENTIN 600 MG: 300 CAPSULE ORAL at 01:11

## 2018-12-29 RX ADMIN — INSULIN GLARGINE 30 UNITS: 100 INJECTION, SOLUTION SUBCUTANEOUS at 01:52

## 2018-12-29 RX ADMIN — AMLODIPINE BESYLATE 2.5 MG: 2.5 TABLET ORAL at 22:40

## 2018-12-29 RX ADMIN — ATORVASTATIN CALCIUM 20 MG: 40 TABLET, FILM COATED ORAL at 01:09

## 2018-12-29 RX ADMIN — GABAPENTIN 600 MG: 300 CAPSULE ORAL at 22:39

## 2018-12-29 RX ADMIN — METOPROLOL SUCCINATE 200 MG: 100 TABLET, FILM COATED, EXTENDED RELEASE ORAL at 17:18

## 2018-12-29 RX ADMIN — STANDARDIZED SENNA CONCENTRATE AND DOCUSATE SODIUM 1 TABLET: 8.6; 5 TABLET, FILM COATED ORAL at 22:39

## 2018-12-29 RX ADMIN — ATORVASTATIN CALCIUM 20 MG: 40 TABLET, FILM COATED ORAL at 22:39

## 2018-12-29 RX ADMIN — ANTACID TABLETS 500 MG: 500 TABLET, CHEWABLE ORAL at 06:21

## 2018-12-29 RX ADMIN — BUDESONIDE AND FORMOTEROL FUMARATE DIHYDRATE 2 PUFF: 80; 4.5 AEROSOL RESPIRATORY (INHALATION) at 17:14

## 2018-12-29 RX ADMIN — LEVOTHYROXINE SODIUM 175 MCG: 150 TABLET ORAL at 06:20

## 2018-12-29 RX ADMIN — METFORMIN HYDROCHLORIDE 500 MG: 500 TABLET ORAL at 17:18

## 2018-12-29 RX ADMIN — DOCUSATE SODIUM 100 MG: 100 CAPSULE, LIQUID FILLED ORAL at 17:14

## 2018-12-29 RX ADMIN — SITAGLIPTIN 100 MG: 100 TABLET, FILM COATED ORAL at 06:20

## 2018-12-29 RX ADMIN — METFORMIN HYDROCHLORIDE 500 MG: 500 TABLET ORAL at 09:01

## 2018-12-29 RX ADMIN — INSULIN GLARGINE 30 UNITS: 100 INJECTION, SOLUTION SUBCUTANEOUS at 22:46

## 2018-12-29 RX ADMIN — AMLODIPINE BESYLATE 2.5 MG: 2.5 TABLET ORAL at 01:08

## 2018-12-29 RX ADMIN — ANTACID TABLETS 500 MG: 500 TABLET, CHEWABLE ORAL at 17:14

## 2018-12-29 RX ADMIN — AMITRIPTYLINE HYDROCHLORIDE 20 MG: 10 TABLET, FILM COATED ORAL at 22:39

## 2018-12-29 RX ADMIN — GABAPENTIN 200 MG: 100 CAPSULE ORAL at 06:21

## 2018-12-29 RX ADMIN — OXYCODONE AND ACETAMINOPHEN 1 TABLET: 5; 325 TABLET ORAL at 15:53

## 2018-12-29 RX ADMIN — POTASSIUM CHLORIDE AND SODIUM CHLORIDE: 900; 150 INJECTION, SOLUTION INTRAVENOUS at 11:29

## 2018-12-29 RX ADMIN — BUDESONIDE AND FORMOTEROL FUMARATE DIHYDRATE 2 PUFF: 80; 4.5 AEROSOL RESPIRATORY (INHALATION) at 12:38

## 2018-12-29 RX ADMIN — CEFAZOLIN SODIUM 2 G: 2 INJECTION, SOLUTION INTRAVENOUS at 06:17

## 2018-12-29 ASSESSMENT — PATIENT HEALTH QUESTIONNAIRE - PHQ9
SUM OF ALL RESPONSES TO PHQ9 QUESTIONS 1 AND 2: 0
1. LITTLE INTEREST OR PLEASURE IN DOING THINGS: NOT AT ALL
2. FEELING DOWN, DEPRESSED, IRRITABLE, OR HOPELESS: NOT AT ALL
1. LITTLE INTEREST OR PLEASURE IN DOING THINGS: NOT AT ALL
2. FEELING DOWN, DEPRESSED, IRRITABLE, OR HOPELESS: NOT AT ALL
SUM OF ALL RESPONSES TO PHQ9 QUESTIONS 1 AND 2: 0

## 2018-12-29 ASSESSMENT — COGNITIVE AND FUNCTIONAL STATUS - GENERAL
PERSONAL GROOMING: A LITTLE
WALKING IN HOSPITAL ROOM: A LOT
TOILETING: A LOT
DRESSING REGULAR UPPER BODY CLOTHING: A LOT
SUGGESTED CMS G CODE MODIFIER MOBILITY: CM
TURNING FROM BACK TO SIDE WHILE IN FLAT BAD: UNABLE
HELP NEEDED FOR BATHING: A LOT
SUGGESTED CMS G CODE MODIFIER DAILY ACTIVITY: CK
MOVING FROM LYING ON BACK TO SITTING ON SIDE OF FLAT BED: UNABLE
MOVING TO AND FROM BED TO CHAIR: UNABLE
STANDING UP FROM CHAIR USING ARMS: A LOT
DRESSING REGULAR LOWER BODY CLOTHING: TOTAL
MOBILITY SCORE: 8
DAILY ACTIVITIY SCORE: 14
CLIMB 3 TO 5 STEPS WITH RAILING: TOTAL

## 2018-12-29 ASSESSMENT — PAIN SCALES - GENERAL
PAINLEVEL_OUTOF10: 6
PAINLEVEL_OUTOF10: 4
PAINLEVEL_OUTOF10: 6
PAINLEVEL_OUTOF10: 5
PAINLEVEL_OUTOF10: 4

## 2018-12-29 ASSESSMENT — ACTIVITIES OF DAILY LIVING (ADL): TOILETING: INDEPENDENT

## 2018-12-29 ASSESSMENT — GAIT ASSESSMENTS
ASSISTIVE DEVICE: FRONT WHEEL WALKER
GAIT LEVEL OF ASSIST: UNABLE TO PARTICIPATE

## 2018-12-29 ASSESSMENT — LIFESTYLE VARIABLES: ALCOHOL_USE: NO

## 2018-12-29 NOTE — OR NURSING
REPORT TO PADDY COLLINS  -160'S/ 50-60. HR 63. 93% 1 L OXYMASK.   AXOX4. RAMIREZ. STRONG. EQUAL. ABLE TO LIFT KNEES OFF BED.    POSTERIOR BACK DRESSING W/ STAPLES, 4X4 MEDIPORE TAPE.   HEMOVAC  OUTPUT= 130  CONKLIN =125    PT REC'D FENTANYL  25 MCGS X 4.  DILAUDID  PCA . SETTINGS 0.2 MG EVERY 8 MINUTES . 5 MG IN 4 HOURS.    BELONGINGS ON BED. BACK BRACE ON BED. GLASSES IN BELONGINGS BAG

## 2018-12-29 NOTE — OR SURGEON
Immediate Post OP Note    PreOp Diagnosis: Recurrent lumbar stenosis at L34 with herniated disc.   Pseudoarthrosis at L45 with spondylolisthesis. Recurrent lumbar stenosis at the L45 level.     PostOp Diagnosis: same    Procedure(s):  LUMBAR FUSION POSTERIOR- L3-5 REDO - Wound Class: Clean with Drain  LUMBAR DECOMPRESSION - Wound Class: Clean with Drain    Surgeon(s):  Avel Sheldon M.D.    Anesthesiologist/Type of Anesthesia:  Anesthesiologist: Osmani Stuart M.D./General    Surgical Staff:  Assistant: RANDA Aguilar  Cell Saver : Antionette Couch  Circulator: Meeta Rahman R.N.  Relief Scrub: Rashaun Tesfaye  Scrub Person: Jeff Ahumada Ass't  Radiology Technologist: Veronika Sanchez    Specimens removed if any:  * No specimens in log *    Estimated Blood Loss: 300 with 140 of cell saver given back    Findings: as above. After 3 prior surgeries, the scar tissue was immense and difficult to dissect    Complications: None        12/29/2018 11:51 AM Avel Sheldon M.D.

## 2018-12-29 NOTE — THERAPY
"Physical Therapy Evaluation completed.   Bed Mobility:  Supine to Sit: Moderate Assist  Transfers: Sit to Stand: Moderate Assist  Gait: Level Of Assist: Unable to Participate with Front-Wheel Walker       Plan of Care: Will benefit from Physical Therapy 5 times per week and Plan to complete next treatment by Sunday 12/30  Discharge Recommendations: Equipment: Will Continue to Assess for Equipment Needs. Post-acute therapy Discharge to a transitional care facility for continued skilled therapy services.    Pt is a 73 year old female admitted to the hospital following L3-L5 decompression/fusion. Pt with extensive history of back surgeries and reports that prior to admit she was using FWW for ambulation. Pt lives in 2 story home but does not access 2nd floor. Friend lives with pt and can assist to some extent with mobility and ADL's. At time of initial evaluation, pt required moderate assist for supine<-->sit transitions. Pt required HOB elevated with use of rail to complete. In sitting, pt noted to have L LE weakness, grossly assessed at 2+ to 3-/5 of proximal musculature. Distal L LE strength intact. Pt does note neuropathy of B feet at baseline. Pt required moderate assist for sit to stand with FWW with cues for UE placement and height of bed elevated to assist with transfer. Once standing, pt attempted weight shift from side to side, however, L knee buckling and having difficulty maintaining upright balance with posterior lean present. Did attempt a second stand but pt unable to complete due to pain and fatigue. Pt would benefit from skilled PT intervention while in the acute care setting to address the listed deficits and improve mobility prior to DC. Based on current level of function, pt would benefit from post acute placement prior to DC home    See \"Rehab Therapy-Acute\" Patient Summary Report for complete documentation.     "

## 2018-12-29 NOTE — CARE PLAN
Problem: Safety  Goal: Will remain free from injury  Outcome: PROGRESSING AS EXPECTED  Safety precaution in effect. Call light within reach. Reminded patient to call for assist. Hourly ronds in effect. verbalized understanding.    Problem: Infection  Goal: Will remain free from infection  Outcome: PROGRESSING AS EXPECTED  Implement standard precaution. Hand washing every encounter & before & after patient care. Verbalized understanding.    Problem: Knowledge Deficit  Goal: Knowledge of disease process/condition, treatment plan, diagnostic tests, and medications will improve  Outcome: PROGRESSING AS EXPECTED  Discussed Plan of care. Questions answered. Verbalized understanding.    Problem: Pain Management  Goal: Pain level will decrease to patient's comfort goal    Intervention: Follow pain managment plan developed in collaboration with patient and Interdisciplinary Team  Outcome : progressing as expected                    Educated on pain scale. Encouraged to verbalize pain. Will medicate as per MAR.

## 2018-12-29 NOTE — PROGRESS NOTES
0950 New order received & acknowledged from Zoila Diaz to d/c dilaudid PCA.    1000 Bernadette, PT worked with the patient.    1125 Patien'ts in bed. IVF patent & infusing. Family visiting. No distress noted. Fall Protocol in effect.    1150 Lianet OT working with the patient.

## 2018-12-29 NOTE — PROGRESS NOTES
0705 Patient's sitting up in bed. Bedside report received from NOC RN (Alvaro) at the beginning of the shift. No distress noted.    0815 Patient's sitting up in bed, having Breakfast. IVF & Dilaudid  PCA. FC to DD. Call light within reach. Reminded patient to call for assist. Hemovac patent. Dressing to back CD&I. Assessment completed. No distress noted. Plan of care reviewed with the patient. . IS provided & educated the importance, able to reach 1500 at least 10x every hour while awake. Verbalzied understanding.     0825  Patient's sitting up in bed. JOLYNN Mendez visited. POC discussed with the patient & son who's visiting.

## 2018-12-29 NOTE — DIETARY
NUTRITION SERVICES: BMI - Pt with BMI >40 (=42.95). Weight loss counseling not appropriate in acute care setting. RECOMMEND - Referral to outpatient nutrition services for weight management after D/C.

## 2018-12-29 NOTE — OP REPORT
DATE OF SERVICE:  12/28/2018    PREOPERATIVE DIAGNOSES:  1.  Status post L1 through L4 decompressive laminectomy in 01/2016.  2.  Status post incision and drainage of wound in 03/2016 with wound VAC   placement.  3.  Status post redo lumbar laminectomy L4-L5 in 10/2016.  4.  Recurrent lumbar stenosis L4-L5 with spondylolisthesis.  5.  L3-L4 recurrent lumbar stenosis with subligamentous herniated disk.  6.  Neurogenic claudication and low back pain.  7.  Morbid obesity with diabetes and hypertension.    POSTOPERATIVE DIAGNOSES:  1.  Status post L1 through L4 decompressive laminectomy in 01/2016.  2.  Status post incision and drainage of wound in 03/2016 with wound VAC   placement.  3.  Status post redo lumbar laminectomy L4-L5 in 10/2016.  4.  Recurrent lumbar stenosis L4-L5 with spondylolisthesis.  5.  L3-L4 recurrent lumbar stenosis with subligamentous herniated disk.  6.  Neurogenic claudication and low back pain.  7.  Morbid obesity with diabetes and hypertension.    PROCEDURES:  1.  Redo medial facetectomy and bilateral foraminotomies and decompression of   L3 nerve roots -- 22 modifier for extremely difficult surgical procedure due   to morbid obesity and multiple previous lumbar laminectomies.  2.  L4 redo laminectomy with medial facetectomy and bilateral foraminotomies   -- 22 modifier secondary to morbid obesity and multiple previous operations   with massive scar tissue.  3.  L5 partial laminectomy with medial facetectomy and bilateral   foraminotomies.  4.  Posterolateral arthrodesis L3-L4 and L4-L5 with use of locally harvested   bone graft and allograft chips.  5.  Instrumented stabilization, L3, L4, L5 with use of Medtronic Solera   instrumentation.  6.  Use of [employer] navigation system.    SURGEON:  Avel Sheldon MD    ASSISTANT:  JOLYNN Aguilar    ANESTHESIA:  General.    ANESTHESIOLOGIST:  Osmani Stuart MD    PREPARATION:  Patient had somatosensory evoked potentials and EMGs  monitored   throughout the case.  Patient has sequential stockings in place.  Patient had   a Ennis catheter sterilely placed.  Patient was given 2 g of Ancef.    SPECIAL CONSIDERATION AND INDICATION:  Patient presents with recurrent lumbar   stenosis at L4-L5 secondary to spondylolisthesis.  This is after 2 operations   on this area, with progressive instability.  At L3-L4, patient also has a   herniated disk, which in addition to facet arthropathy causes severe spinal   stenosis.    It has to be considered that the patient's BMI is 42.  She has diabetes,   hypertension, and has had some issues with surgery in the past due to her 3   back operations, which includes an I and D with placement of a wound VAC.    Patient has grown exuberant scar tissue, and this made this case extremely   difficult.    Patient is thus given a modifier for the decompressive part of the procedure,   which made this procedure approximately 25-40% more complicated.  The   indications and possible complications were explained, informed consent was   obtained.    DESCRIPTION OF PROCEDURE:  Patient was brought to the operating room and   following induction, patient was intubated and placed under general   anesthetic.  Ennis catheter was placed and she was given 2 g of Ancef.  She   was prepared for somatosensory evoked potentials and EMGs.  Rolled prone onto   the OSI table using the chest, hip, thigh pads, all pressure points were   padded, sequential stockings were placed.  The back was prepped and draped in   sterile fashion.  An incision was made through the former incision, and   carried down to the subcutaneous tissue.  Scar tissue was extremely thick and   tenacious.  We were able to dissect over the spinous process of L2, and   subsequently through a very thick scar tissue and identify the facet joints at   L3-L4, L4-L5, and L5-S1.  Retractors were placed to maintain exposure.  It   took extra time, approximately 25-40% more time to  dissect over the transverse   processes of L3, L4, and L5 bilaterally.  These areas were decorticated after   cleaning off tremendous amount of scar tissue and blood from the   decortication.  We used to reconstitute 30 mL of allograft chips.  Meticulous   hemostasis was obtained.  We then curetted off scar tissue from the facet   joints at L3-L4 and L4-L5.  This was further removed with Leksells down to the   bone.  We were able to identify the facet at L4-L5 and L3-L4.    On the left hand side where the stenosis was greatest, we drilled the pars   interarticularis and took the complete facet, to get decompression of the   lateral recess stenosis.  We performed a wide foraminotomy over the L3, L4,   and L5 nerve root, dissecting scar tissue free from the superior facet of L4   and L5.    At L3-L4, we were able to dissect the thecal sac free and use sharp dissection   to get to a herniated disk, which was removed with downbiting curettes.  Most   of this was calcified, and spine was actually curved, with a concave to the   left.  This scoliosis was degenerative.    We were able to again decompress all the nerve roots at this time, locally   harvested bone graft and allograft chips were ground up and then packed into   the decorticated margins bilaterally.    At this time, we were able to identify the superior, medial, and inferior   aspect of each pedicle and drill down the pedicle with the Midas Edwar followed   by the [employer] navigation system and a 5.5 tap.  We were able to place   6.5x50 mm screws into L3, L4, and L5 on the left hand side.  A 60 mm gatito was   placed into the multiaxial heads and the inferior screw was secured tightly.    We distracted approximately 5 mm in L3-L4 and L4-L5, and tighten the screws   down to correct the degenerative scoliotic compression of the nerve roots in   the foramen.  Going to the contralateral side, we drilled the medial facets to   a thin shelf, and we were able to curette  the thecal sac free and scar   tissue, taking some time to do this at each level.  Kerrisons were used to   further decompress around the pedicle of L3, L4, and L5 with foraminotomies   being performed.  Again, locally harvested bone graft was packed into the   posterolateral gutters.  We were able to drill down each pedicle of L3, L4,   and L5 with a Midas Edwar followed by the 5.5 tap and a 6.5x50 mm screw was   placed into L3, a 6.5x40 mm screw was placed into L4, and a 6.5x60 mm screw   was placed into L5.    A 60 mm gatito was placed into the multiaxial heads and all set screws were   secured and broken off at appropriate torque.    Intraoperative x-ray confirmed good location of all instrumentation in the AP   and lateral position.  Meticulous hemostasis was obtained.  We packed the   locally harvested bone graft and allograft chip mixture into the   posterolateral gutters.  A gram of vancomycin was sprinkled over the wound,   medium-sized Hemovac was then placed.    We closed the fascia with #1 Vicryl suture, followed by the subcutaneous   tissue with 2-0 Vicryl, and the subcuticular and skin with staples.  All   sponge and needle counts were correct.  Estimated blood loss for the procedure   was approximately 300 mL.    The level of difficulty of this case was certainly increased by her morbid   obesity, and also by the multiple previous operations including the placement   of a wound VAC, which caused the approach, exposure, and decompression to be   very complicated and again, 25-40% more complicated than normally would be   found in a case such as this, even in a redo case such as this.       ____________________________________     MD LAKISHA NELSON / JENNY    DD:  12/29/2018 12:20:47  DT:  12/29/2018 13:17:27    D#:  [id number]  Job#:  [id number]

## 2018-12-29 NOTE — PROGRESS NOTES
1836 Report received over the phone from Rachel, PACU RN.    1905 Patient hasn't arrived to floor yet. Reporrt given to Oncoming NOC RN (Alvaro).

## 2018-12-29 NOTE — PROGRESS NOTES
Neurosurgery Progress Note    Subjective:  Sitting up in bed, states she feels sore everywhere, but that it is manageable.   She is doing much better than previous surgeries  She still has symptoms in lle, but not as severe.  She has not gotten oob yet      Exam:  AAO, son present  LE's strong except left IP  Dressing c&d  Drain 365cc    BP  Min: 122/44  Max: 167/74  Pulse  Av.4  Min: 59  Max: 71  Resp  Avg: 15.1  Min: 5  Max: 24  Temp  Av.5 °C (85.1 °F)  Min: -12.6 °C (9.4 °F)  Max: 36.9 °C (98.4 °F)  SpO2  Av %  Min: 94 %  Max: 100 %    No Data Recorded    Recent Labs      18   0348   WBC  11.8*   RBC  4.23   HEMOGLOBIN  13.0   HEMATOCRIT  41.9   MCV  99.1*   MCH  30.7   MCHC  31.0*   RDW  46.7   PLATELETCT  155*   MPV  11.5     Recent Labs      18   0348   SODIUM  141   POTASSIUM  5.2   CHLORIDE  106   CO2  27   GLUCOSE  139*   BUN  16   CREATININE  0.94   CALCIUM  9.1               Intake/Output       18 0700 - 18 0659 18 0700 - 18 0659      3425-5336 0388-2845 Total 9764-7276 7568-5645 Total       Intake    P.O.  60  60 120  360  -- 360    P.O. 60 60 120 360 -- 360    I.V.    400 2400  20  -- 20    Crystalloid Intake  400 2400 -- -- --    PCA End of Shift Total Volume (ml) -- -- -- 20 -- 20    Blood  140  -- 140  --  -- --    Cell Saver Volume (mL) 140 -- 140 -- -- --    Total Intake 2200 460 2660 380 -- 380       Output    Urine  450  125 575  --  -- --    Output (mL) (Urethral Catheter Latex 16 Fr.) 450 125 575 -- -- --    Drains  --  365 365  --  -- --    Output (mL) (Closed/Suction Drain 1 Posterior Back Hemovac) -- 365 365 -- -- --    Blood  300  -- 300  --  -- --    Est. Blood Loss (mL) 300 -- 300 -- -- --    Total Output  -- -- --       Net I/O     1450 -30 1420 380 -- 380            Intake/Output Summary (Last 24 hours) at 18 0953  Last data filed at 18 0815   Gross per 24 hour   Intake             3040 ml   Output              1240 ml   Net             1800 ml            • amitriptyline  20 mg QHS   • amLODIPine  2.5 mg QHS   • atorvastatin  20 mg QHS   • budesonide-formoterol  2 Puff BID (RT)   • gabapentin  200 mg QAM   • gabapentin  600 mg QHS   • insulin glargine  30 Units Nightly   • levothyroxine  175 mcg AM ES   • metFORMIN  500 mg BID WITH MEALS   • metoprolol SR  200 mg BID   • oxyCODONE-acetaminophen  1-2 Tab Q4HRS PRN   • SITagliptin  100 mg QAM   • Pharmacy Consult Request  1 Each PRN   • MD ALERT...DO NOT ADMINISTER NSAIDS or ASPIRIN unless ORDERED By Neurosurgery  1 Each PRN   • docusate sodium  100 mg BID   • senna-docusate  1 Tab Nightly   • senna-docusate  1 Tab Q24HRS PRN   • polyethylene glycol/lytes  1 Packet BID PRN   • magnesium hydroxide  30 mL QDAY PRN   • bisacodyl  10 mg Q24HRS PRN   • fleet  1 Each Once PRN   • 0.9 % NaCl with KCl 20 mEq 1,000 mL   Continuous   • HYDROmorphone   Continuous   • diphenhydrAMINE  25 mg Q6HRS PRN    Or   • diphenhydrAMINE  25 mg Q6HRS PRN   • ondansetron  4 mg Q4HRS PRN   • ondansetron  4 mg Q4HRS PRN   • tizanidine  2 mg TID PRN   • hydrALAZINE  10 mg Q HOUR PRN   • benzocaine-menthol  1 Lozenge Q2HRS PRN   • calcium carbonate  500 mg BID       Assessment and Plan:  POD # 1 L 3-5 D/F  NM as above  Prophylactic anticoagulation: no         Start date/time: tbd  Pt to mobilize today with brace on  DC pca & ok to HL iv if taking po well  Work on using IS today  DC roberts possibly tomorrow or when she is mobile  Drain to remain  HH stable today, check in am

## 2018-12-29 NOTE — THERAPY
"Occupational Therapy Evaluation completed.   Functional Status:  Mod A supine>EOB with log roll, mod A sit>stand, min A bed>chair txf, Max A LB dress, SPV don/doff LSO   Plan of Care: Will benefit from Occupational Therapy 4 times per week  Discharge Recommendations:  Equipment: Will Continue to Assess for Equipment Needs. Post-acute therapy Recommend inpatient transitional care services for continued occupational therapy services.        See \"Rehab Therapy-Acute\" Patient Summary Report for complete documentation.    Pt is 72 yo female s/p L3-L5 redo, pmhx that includes 3 prior spinal surgeries. Pt completed log roll supine->sit from flat bed with verbal cues and mod-maxA d/t L LE weakness and obesity. Pt attempted sit->stand x5, unable to complete with bed in lowest position. Pt successfully performed sit->stand txf with bed elevated, FWW, and modAx2 (appears capable of sit to stand with modAx1 but was less anxious with second person). Pt completed bed->chair txf with Celestino, took small side steps to R and pivoted to chair, cues required to reach back for the chair and sit in controlled manner. Pt is limited by pain, generalized weakness, endurance and independence in basic ADLs. Pt lives with a friend who is able to assist as needed but at current level pt will likely require post-acute placement for additional skilled therapy to improve above limitations prior to d/c. Pt will benefit from acute OT for adaptive equipment training, increased activity tolerance and to increase independence in functional transfers for ADLs.       "

## 2018-12-29 NOTE — RESPIRATORY CARE
COPD EDUCATION by COPD CLINICAL EDUCATOR  12/29/2018 at 7:37 AM by Chantal Gibson     Patient reviewed by COPD education team. Patient does not qualify for COPD program.

## 2018-12-30 LAB
ANION GAP SERPL CALC-SCNC: 5 MMOL/L (ref 0–11.9)
BUN SERPL-MCNC: 13 MG/DL (ref 8–22)
CALCIUM SERPL-MCNC: 8.8 MG/DL (ref 8.5–10.5)
CHLORIDE SERPL-SCNC: 106 MMOL/L (ref 96–112)
CO2 SERPL-SCNC: 27 MMOL/L (ref 20–33)
CREAT SERPL-MCNC: 0.96 MG/DL (ref 0.5–1.4)
ERYTHROCYTE [DISTWIDTH] IN BLOOD BY AUTOMATED COUNT: 46.1 FL (ref 35.9–50)
GLUCOSE BLD-MCNC: 157 MG/DL (ref 65–99)
GLUCOSE SERPL-MCNC: 151 MG/DL (ref 65–99)
HCT VFR BLD AUTO: 35 % (ref 37–47)
HGB BLD-MCNC: 11.2 G/DL (ref 12–16)
MCH RBC QN AUTO: 30.7 PG (ref 27–33)
MCHC RBC AUTO-ENTMCNC: 32 G/DL (ref 33.6–35)
MCV RBC AUTO: 95.9 FL (ref 81.4–97.8)
PLATELET # BLD AUTO: 207 K/UL (ref 164–446)
PMV BLD AUTO: 10.8 FL (ref 9–12.9)
POTASSIUM SERPL-SCNC: 4.3 MMOL/L (ref 3.6–5.5)
RBC # BLD AUTO: 3.65 M/UL (ref 4.2–5.4)
SODIUM SERPL-SCNC: 138 MMOL/L (ref 135–145)
WBC # BLD AUTO: 10.9 K/UL (ref 4.8–10.8)

## 2018-12-30 PROCEDURE — 82962 GLUCOSE BLOOD TEST: CPT

## 2018-12-30 PROCEDURE — 80048 BASIC METABOLIC PNL TOTAL CA: CPT

## 2018-12-30 PROCEDURE — A9270 NON-COVERED ITEM OR SERVICE: HCPCS | Performed by: NURSE PRACTITIONER

## 2018-12-30 PROCEDURE — 85027 COMPLETE CBC AUTOMATED: CPT

## 2018-12-30 PROCEDURE — 97530 THERAPEUTIC ACTIVITIES: CPT

## 2018-12-30 PROCEDURE — 700112 HCHG RX REV CODE 229: Performed by: NURSE PRACTITIONER

## 2018-12-30 PROCEDURE — 770001 HCHG ROOM/CARE - MED/SURG/GYN PRIV*

## 2018-12-30 PROCEDURE — 700102 HCHG RX REV CODE 250 W/ 637 OVERRIDE(OP): Performed by: NURSE PRACTITIONER

## 2018-12-30 PROCEDURE — 36415 COLL VENOUS BLD VENIPUNCTURE: CPT

## 2018-12-30 PROCEDURE — 97116 GAIT TRAINING THERAPY: CPT

## 2018-12-30 PROCEDURE — 97110 THERAPEUTIC EXERCISES: CPT

## 2018-12-30 RX ADMIN — POLYETHYLENE GLYCOL 3350 1 PACKET: 17 POWDER, FOR SOLUTION ORAL at 21:33

## 2018-12-30 RX ADMIN — GABAPENTIN 200 MG: 100 CAPSULE ORAL at 05:10

## 2018-12-30 RX ADMIN — METOPROLOL SUCCINATE 200 MG: 100 TABLET, FILM COATED, EXTENDED RELEASE ORAL at 05:11

## 2018-12-30 RX ADMIN — OXYCODONE AND ACETAMINOPHEN 2 TABLET: 5; 325 TABLET ORAL at 08:26

## 2018-12-30 RX ADMIN — GABAPENTIN 600 MG: 300 CAPSULE ORAL at 21:39

## 2018-12-30 RX ADMIN — METOPROLOL SUCCINATE 200 MG: 100 TABLET, FILM COATED, EXTENDED RELEASE ORAL at 18:27

## 2018-12-30 RX ADMIN — OXYCODONE AND ACETAMINOPHEN 1 TABLET: 5; 325 TABLET ORAL at 21:38

## 2018-12-30 RX ADMIN — ANTACID TABLETS 500 MG: 500 TABLET, CHEWABLE ORAL at 05:07

## 2018-12-30 RX ADMIN — STANDARDIZED SENNA CONCENTRATE AND DOCUSATE SODIUM 1 TABLET: 8.6; 5 TABLET, FILM COATED ORAL at 21:40

## 2018-12-30 RX ADMIN — ANTACID TABLETS 500 MG: 500 TABLET, CHEWABLE ORAL at 18:28

## 2018-12-30 RX ADMIN — BUDESONIDE AND FORMOTEROL FUMARATE DIHYDRATE 2 PUFF: 80; 4.5 AEROSOL RESPIRATORY (INHALATION) at 05:11

## 2018-12-30 RX ADMIN — DOCUSATE SODIUM 100 MG: 100 CAPSULE, LIQUID FILLED ORAL at 18:27

## 2018-12-30 RX ADMIN — ATORVASTATIN CALCIUM 20 MG: 40 TABLET, FILM COATED ORAL at 21:40

## 2018-12-30 RX ADMIN — BUDESONIDE AND FORMOTEROL FUMARATE DIHYDRATE 2 PUFF: 80; 4.5 AEROSOL RESPIRATORY (INHALATION) at 18:29

## 2018-12-30 RX ADMIN — AMLODIPINE BESYLATE 2.5 MG: 2.5 TABLET ORAL at 21:40

## 2018-12-30 RX ADMIN — INSULIN GLARGINE 30 UNITS: 100 INJECTION, SOLUTION SUBCUTANEOUS at 21:48

## 2018-12-30 RX ADMIN — LEVOTHYROXINE SODIUM 175 MCG: 150 TABLET ORAL at 05:07

## 2018-12-30 RX ADMIN — AMITRIPTYLINE HYDROCHLORIDE 20 MG: 10 TABLET, FILM COATED ORAL at 21:38

## 2018-12-30 RX ADMIN — SITAGLIPTIN 100 MG: 100 TABLET, FILM COATED ORAL at 05:11

## 2018-12-30 RX ADMIN — METFORMIN HYDROCHLORIDE 500 MG: 500 TABLET ORAL at 18:27

## 2018-12-30 RX ADMIN — METFORMIN HYDROCHLORIDE 500 MG: 500 TABLET ORAL at 08:26

## 2018-12-30 RX ADMIN — TIZANIDINE 2 MG: 4 TABLET ORAL at 18:28

## 2018-12-30 ASSESSMENT — COGNITIVE AND FUNCTIONAL STATUS - GENERAL
MOBILITY SCORE: 8
TURNING FROM BACK TO SIDE WHILE IN FLAT BAD: UNABLE
STANDING UP FROM CHAIR USING ARMS: A LOT
MOVING TO AND FROM BED TO CHAIR: UNABLE
SUGGESTED CMS G CODE MODIFIER MOBILITY: CM
WALKING IN HOSPITAL ROOM: A LOT
MOVING FROM LYING ON BACK TO SITTING ON SIDE OF FLAT BED: UNABLE
CLIMB 3 TO 5 STEPS WITH RAILING: TOTAL

## 2018-12-30 ASSESSMENT — GAIT ASSESSMENTS
DEVIATION: STEP TO;BRADYKINETIC;SHUFFLED GAIT
GAIT LEVEL OF ASSIST: MINIMAL ASSIST
ASSISTIVE DEVICE: FRONT WHEEL WALKER
DISTANCE (FEET): 5

## 2018-12-30 ASSESSMENT — PAIN SCALES - GENERAL
PAINLEVEL_OUTOF10: 4
PAINLEVEL_OUTOF10: 6
PAINLEVEL_OUTOF10: 4
PAINLEVEL_OUTOF10: 3
PAINLEVEL_OUTOF10: 6
PAINLEVEL_OUTOF10: 6
PAINLEVEL_OUTOF10: 3

## 2018-12-30 NOTE — PROGRESS NOTES
Received report and assumed pt care.  A&O x4.  Bed alarm and treaded socks on.  Call light and personal belongings within reach.  Bed locked and at lowest position.  NORMA LEON.

## 2018-12-30 NOTE — PROGRESS NOTES
Neurosurgery Progress Note    Subjective:  No acute events. Drowsy after pain medication. On 3L NC, not on O2 at home. Pt up at side of bed for breakfast. Has not been oob yet.      Exam:  A&O, fluent speech  Strength: Right 5/5, Left IP 4-/5 DF, PF 5/5 with n/t from toes to thigh  Dressing c&d  Drain 65cc/8hrs serosang  Ennis  Pain controlled. Denies N/V    BP  Min: 102/41  Max: 142/58  Pulse  Av  Min: 65  Max: 82  Resp  Av.8  Min: 16  Max: 18  Temp  Av.7 °C (98.1 °F)  Min: 36.4 °C (97.5 °F)  Max: 37.3 °C (99.2 °F)  SpO2  Av %  Min: 94 %  Max: 97 %    No Data Recorded    Recent Labs      18   0348  18   0238   WBC  11.8*  10.9*   RBC  4.23  3.65*   HEMOGLOBIN  13.0  11.2*   HEMATOCRIT  41.9  35.0*   MCV  99.1*  95.9   MCH  30.7  30.7   MCHC  31.0*  32.0*   RDW  46.7  46.1   PLATELETCT  155*  207   MPV  11.5  10.8     Recent Labs      18   0348  18   0237   SODIUM  141  138   POTASSIUM  5.2  4.3   CHLORIDE  106  106   CO2  27  27   GLUCOSE  139*  151*   BUN  16  13   CREATININE  0.94  0.96   CALCIUM  9.1  8.8               Intake/Output       18 0700 - 18 0659 18 0700 - 18 0659       7062-3128 Total 5447-0844 7675-1391 Total       Intake    P.O.  360  -- 360  --  -- --    P.O. 360 -- 360 -- -- --    I.V.  21  1200 1221  --  -- --    PCA End of Shift Total Volume (ml) 21 -- 21 -- -- --    Volume (mL) (0.9 % NaCl with KCl 20 mEq infusion) -- 1200 1200 -- -- --    IV Piggyback  --  100 100  --  -- --    Volume (mL) (ceFAZolin in dextrose (ANCEF) IVPB premix 2 g) -- 100 100 -- -- --    Total Intake 381 1300 1681 -- -- --       Output    Urine  1050   3050  --  -- --    Output (mL) (Urethral Catheter Latex 16 Fr.) 1050  3050 -- -- --    Drains  140  160 300  --  -- --    Output (mL) (Closed/Suction Drain 1 Posterior Back Hemovac) 140 160 300 -- -- --    Stool  --  -- --  --  -- --    Number of Times Stooled -- -- -- 0 x -- 0 x    Total  Output 1190 2160 3350 -- -- --       Net I/O     -809 -860 -1669 -- -- --            Intake/Output Summary (Last 24 hours) at 12/30/18 1008  Last data filed at 12/30/18 0400   Gross per 24 hour   Intake             1301 ml   Output             3350 ml   Net            -2049 ml            • amitriptyline  20 mg QHS   • amLODIPine  2.5 mg QHS   • atorvastatin  20 mg QHS   • gabapentin  200 mg QAM   • gabapentin  600 mg QHS   • insulin glargine  30 Units Nightly   • levothyroxine  175 mcg AM ES   • metFORMIN  500 mg BID WITH MEALS   • metoprolol SR  200 mg BID   • oxyCODONE-acetaminophen  1-2 Tab Q4HRS PRN   • SITagliptin  100 mg QAM   • HYDROmorphone  2 mg Q3HRS PRN   • budesonide-formoterol  2 Puff BID   • Pharmacy Consult Request  1 Each PRN   • MD ALERT...DO NOT ADMINISTER NSAIDS or ASPIRIN unless ORDERED By Neurosurgery  1 Each PRN   • docusate sodium  100 mg BID   • senna-docusate  1 Tab Nightly   • senna-docusate  1 Tab Q24HRS PRN   • polyethylene glycol/lytes  1 Packet BID PRN   • magnesium hydroxide  30 mL QDAY PRN   • bisacodyl  10 mg Q24HRS PRN   • fleet  1 Each Once PRN   • 0.9 % NaCl with KCl 20 mEq 1,000 mL   Continuous   • diphenhydrAMINE  25 mg Q6HRS PRN    Or   • diphenhydrAMINE  25 mg Q6HRS PRN   • ondansetron  4 mg Q4HRS PRN   • ondansetron  4 mg Q4HRS PRN   • tizanidine  2 mg TID PRN   • hydrALAZINE  10 mg Q HOUR PRN   • benzocaine-menthol  1 Lozenge Q2HRS PRN   • calcium carbonate  500 mg BID       Assessment and Plan:  POD # 2 L 3-5 D/F    Prophylactic anticoagulation: no         Start date/time: tbd    Plan:  Patient drowsy after pain medication, pain is well controlled, review of MAR=2 tabs given, Recommend next dose of pain medications to give 1 tab. Utilize muscle relaxer for pain control as well. Will discuss this plan with nursing. Patient requiring 3L oxygen via NC. Likely due to sedation from pain medication.   Encourage IS  Wean Oxygen  PT- mobilize patient  Brace on when OOB>5min  DC  roberts when pt mobile  Hemovac remains today  HH remains stable

## 2018-12-30 NOTE — PROGRESS NOTES
1635 Placed a call to JOLYNN Perera, awaiting for response.    1649 Received a call from JOLYNN Perera. Notified the said APN that patient's requesting for Accu check AC & HS, Boost Glucose control & Diabetic diet, new order received & acknowledged.    1750 Patient's sitting up in bed, having Dinner. No distress noted.    1910 Patient's sitting up in bed. No changes in status. Bedside report given to Oncoming NOC RN (Alvaro).

## 2018-12-31 LAB — GLUCOSE BLD-MCNC: 190 MG/DL (ref 65–99)

## 2018-12-31 PROCEDURE — 770001 HCHG ROOM/CARE - MED/SURG/GYN PRIV*

## 2018-12-31 PROCEDURE — 700102 HCHG RX REV CODE 250 W/ 637 OVERRIDE(OP): Performed by: NURSE PRACTITIONER

## 2018-12-31 PROCEDURE — 700112 HCHG RX REV CODE 229: Performed by: NURSE PRACTITIONER

## 2018-12-31 PROCEDURE — 82962 GLUCOSE BLOOD TEST: CPT

## 2018-12-31 PROCEDURE — A9270 NON-COVERED ITEM OR SERVICE: HCPCS | Performed by: NURSE PRACTITIONER

## 2018-12-31 RX ADMIN — METFORMIN HYDROCHLORIDE 500 MG: 500 TABLET ORAL at 20:03

## 2018-12-31 RX ADMIN — DOCUSATE SODIUM 100 MG: 100 CAPSULE, LIQUID FILLED ORAL at 06:27

## 2018-12-31 RX ADMIN — SITAGLIPTIN 100 MG: 100 TABLET, FILM COATED ORAL at 06:26

## 2018-12-31 RX ADMIN — METOPROLOL SUCCINATE 200 MG: 100 TABLET, FILM COATED, EXTENDED RELEASE ORAL at 20:04

## 2018-12-31 RX ADMIN — OXYCODONE AND ACETAMINOPHEN 1 TABLET: 5; 325 TABLET ORAL at 11:02

## 2018-12-31 RX ADMIN — AMLODIPINE BESYLATE 2.5 MG: 2.5 TABLET ORAL at 21:17

## 2018-12-31 RX ADMIN — GABAPENTIN 600 MG: 300 CAPSULE ORAL at 21:17

## 2018-12-31 RX ADMIN — BUDESONIDE AND FORMOTEROL FUMARATE DIHYDRATE 2 PUFF: 80; 4.5 AEROSOL RESPIRATORY (INHALATION) at 06:25

## 2018-12-31 RX ADMIN — METOPROLOL SUCCINATE 200 MG: 100 TABLET, FILM COATED, EXTENDED RELEASE ORAL at 06:26

## 2018-12-31 RX ADMIN — BUDESONIDE AND FORMOTEROL FUMARATE DIHYDRATE 2 PUFF: 80; 4.5 AEROSOL RESPIRATORY (INHALATION) at 20:04

## 2018-12-31 RX ADMIN — TIZANIDINE 2 MG: 4 TABLET ORAL at 08:43

## 2018-12-31 RX ADMIN — ANTACID TABLETS 500 MG: 500 TABLET, CHEWABLE ORAL at 20:04

## 2018-12-31 RX ADMIN — ATORVASTATIN CALCIUM 20 MG: 40 TABLET, FILM COATED ORAL at 21:17

## 2018-12-31 RX ADMIN — OXYCODONE AND ACETAMINOPHEN 2 TABLET: 5; 325 TABLET ORAL at 21:17

## 2018-12-31 RX ADMIN — AMITRIPTYLINE HYDROCHLORIDE 20 MG: 10 TABLET, FILM COATED ORAL at 21:17

## 2018-12-31 RX ADMIN — GABAPENTIN 200 MG: 100 CAPSULE ORAL at 06:26

## 2018-12-31 RX ADMIN — INSULIN GLARGINE 30 UNITS: 100 INJECTION, SOLUTION SUBCUTANEOUS at 21:19

## 2018-12-31 RX ADMIN — DOCUSATE SODIUM 100 MG: 100 CAPSULE, LIQUID FILLED ORAL at 20:03

## 2018-12-31 RX ADMIN — ANTACID TABLETS 500 MG: 500 TABLET, CHEWABLE ORAL at 06:26

## 2018-12-31 RX ADMIN — METFORMIN HYDROCHLORIDE 500 MG: 500 TABLET ORAL at 08:43

## 2018-12-31 RX ADMIN — LEVOTHYROXINE SODIUM 175 MCG: 150 TABLET ORAL at 06:26

## 2018-12-31 ASSESSMENT — PAIN SCALES - GENERAL
PAINLEVEL_OUTOF10: 6
PAINLEVEL_OUTOF10: 5
PAINLEVEL_OUTOF10: 4
PAINLEVEL_OUTOF10: 5
PAINLEVEL_OUTOF10: 4
PAINLEVEL_OUTOF10: 3

## 2018-12-31 NOTE — PROGRESS NOTES
Neurosurgery Progress Note    Subjective:  No acute events. In bed, states she is doing ok, but not able to walk much, has been oob to cammode, still on O2 and has roberts, +flatus, -bm      Exam:  A&O, fluent speech  Strength: df/pf- 5/5  Dressing c&d  Drain 40ml  Roberts  Pain controlled. Denies N/V    BP  Min: 123/43  Max: 144/58  Pulse  Av.3  Min: 62  Max: 77  Resp  Av.8  Min: 17  Max: 18  Temp  Av.4 °C (97.6 °F)  Min: 36 °C (96.8 °F)  Max: 36.9 °C (98.4 °F)  SpO2  Av %  Min: 95 %  Max: 95 %    No Data Recorded    Recent Labs      18   0348  18   0238   WBC  11.8*  10.9*   RBC  4.23  3.65*   HEMOGLOBIN  13.0  11.2*   HEMATOCRIT  41.9  35.0*   MCV  99.1*  95.9   MCH  30.7  30.7   MCHC  31.0*  32.0*   RDW  46.7  46.1   PLATELETCT  155*  207   MPV  11.5  10.8     Recent Labs      18   0348  18   0237   SODIUM  141  138   POTASSIUM  5.2  4.3   CHLORIDE  106  106   CO2  27  27   GLUCOSE  139*  151*   BUN  16  13   CREATININE  0.94  0.96   CALCIUM  9.1  8.8               Intake/Output       18 0700 - 18 0659 18 0700 - 19 0659       5236-9893 Total 0377-0039 0911-5322 Total       Intake    P.O.  720  -- 720  --  -- --    P.O. 720 -- 720 -- -- --    Total Intake 720 -- 720 -- -- --       Output    Urine  1200  1000 2200  --  -- --    Output (mL) (Urethral Catheter Latex 16 Fr.) 1200 1000 2200 -- -- --    Drains  80  100 180  --  -- --    Output (mL) (Closed/Suction Drain 1 Posterior Back Hemovac) 80 100 180 -- -- --    Stool  --  -- --  --  -- --    Number of Times Stooled 0 x -- 0 x 1 x -- 1 x    Total Output 1280 1100 2380 -- -- --       Net I/O     -560 -1100 -1660 -- -- --            Intake/Output Summary (Last 24 hours) at 18 1127  Last data filed at 18 0400   Gross per 24 hour   Intake              480 ml   Output             2380 ml   Net            -1900 ml            • amitriptyline  20 mg QHS   • amLODIPine  2.5 mg QHS   •  atorvastatin  20 mg QHS   • gabapentin  200 mg QAM   • gabapentin  600 mg QHS   • insulin glargine  30 Units Nightly   • levothyroxine  175 mcg AM ES   • metFORMIN  500 mg BID WITH MEALS   • metoprolol SR  200 mg BID   • oxyCODONE-acetaminophen  1-2 Tab Q4HRS PRN   • SITagliptin  100 mg QAM   • HYDROmorphone  2 mg Q3HRS PRN   • budesonide-formoterol  2 Puff BID   • Pharmacy Consult Request  1 Each PRN   • MD ALERT...DO NOT ADMINISTER NSAIDS or ASPIRIN unless ORDERED By Neurosurgery  1 Each PRN   • docusate sodium  100 mg BID   • senna-docusate  1 Tab Nightly   • senna-docusate  1 Tab Q24HRS PRN   • polyethylene glycol/lytes  1 Packet BID PRN   • magnesium hydroxide  30 mL QDAY PRN   • bisacodyl  10 mg Q24HRS PRN   • fleet  1 Each Once PRN   • 0.9 % NaCl with KCl 20 mEq 1,000 mL   Continuous   • diphenhydrAMINE  25 mg Q6HRS PRN    Or   • diphenhydrAMINE  25 mg Q6HRS PRN   • ondansetron  4 mg Q4HRS PRN   • ondansetron  4 mg Q4HRS PRN   • tizanidine  2 mg TID PRN   • hydrALAZINE  10 mg Q HOUR PRN   • benzocaine-menthol  1 Lozenge Q2HRS PRN   • calcium carbonate  500 mg BID       Assessment and Plan:  POD # 3 L 3-5 D/F    Prophylactic anticoagulation: no         Start date/time: tbd    Plan:   Encourage IS  Wean Oxygen  PT- mobilize patient  Brace on when OOB>5min  LISA roberts Dc hemovac  Rehab vs SNF

## 2018-12-31 NOTE — PROGRESS NOTES
Received report and assumed pt care.  A&O x4.  Bed alarm and treaded socks on.  Call light and personal belongings within reach.  Bed locked and at lowest position.  RAMIREZ.  VSS.  Ambulates with x2 assist and FWW to bathroom.

## 2019-01-01 LAB
GLUCOSE BLD-MCNC: 114 MG/DL (ref 65–99)
GLUCOSE BLD-MCNC: 195 MG/DL (ref 65–99)

## 2019-01-01 PROCEDURE — A9270 NON-COVERED ITEM OR SERVICE: HCPCS | Performed by: NURSE PRACTITIONER

## 2019-01-01 PROCEDURE — 700112 HCHG RX REV CODE 229: Performed by: NURSE PRACTITIONER

## 2019-01-01 PROCEDURE — 82962 GLUCOSE BLOOD TEST: CPT

## 2019-01-01 PROCEDURE — 770001 HCHG ROOM/CARE - MED/SURG/GYN PRIV*

## 2019-01-01 PROCEDURE — 97116 GAIT TRAINING THERAPY: CPT

## 2019-01-01 PROCEDURE — 97535 SELF CARE MNGMENT TRAINING: CPT

## 2019-01-01 PROCEDURE — 302196 LINEN, HYPOALLERGENIC: Performed by: NEUROLOGICAL SURGERY

## 2019-01-01 PROCEDURE — 700102 HCHG RX REV CODE 250 W/ 637 OVERRIDE(OP): Performed by: NURSE PRACTITIONER

## 2019-01-01 PROCEDURE — 97530 THERAPEUTIC ACTIVITIES: CPT

## 2019-01-01 RX ADMIN — DOCUSATE SODIUM 100 MG: 100 CAPSULE, LIQUID FILLED ORAL at 05:44

## 2019-01-01 RX ADMIN — ANTACID TABLETS 500 MG: 500 TABLET, CHEWABLE ORAL at 05:44

## 2019-01-01 RX ADMIN — OXYCODONE AND ACETAMINOPHEN 1 TABLET: 5; 325 TABLET ORAL at 05:44

## 2019-01-01 RX ADMIN — SITAGLIPTIN 100 MG: 100 TABLET, FILM COATED ORAL at 05:45

## 2019-01-01 RX ADMIN — ANTACID TABLETS 500 MG: 500 TABLET, CHEWABLE ORAL at 18:19

## 2019-01-01 RX ADMIN — GABAPENTIN 200 MG: 100 CAPSULE ORAL at 05:45

## 2019-01-01 RX ADMIN — METFORMIN HYDROCHLORIDE 500 MG: 500 TABLET ORAL at 18:20

## 2019-01-01 RX ADMIN — OXYCODONE AND ACETAMINOPHEN 2 TABLET: 5; 325 TABLET ORAL at 15:04

## 2019-01-01 RX ADMIN — DOCUSATE SODIUM 100 MG: 100 CAPSULE, LIQUID FILLED ORAL at 18:19

## 2019-01-01 RX ADMIN — OXYCODONE AND ACETAMINOPHEN 2 TABLET: 5; 325 TABLET ORAL at 20:32

## 2019-01-01 RX ADMIN — TIZANIDINE 2 MG: 4 TABLET ORAL at 08:13

## 2019-01-01 RX ADMIN — TIZANIDINE 2 MG: 4 TABLET ORAL at 18:19

## 2019-01-01 RX ADMIN — METOPROLOL SUCCINATE 200 MG: 100 TABLET, FILM COATED, EXTENDED RELEASE ORAL at 05:44

## 2019-01-01 RX ADMIN — AMLODIPINE BESYLATE 2.5 MG: 2.5 TABLET ORAL at 20:32

## 2019-01-01 RX ADMIN — METFORMIN HYDROCHLORIDE 500 MG: 500 TABLET ORAL at 08:13

## 2019-01-01 RX ADMIN — BUDESONIDE AND FORMOTEROL FUMARATE DIHYDRATE 2 PUFF: 80; 4.5 AEROSOL RESPIRATORY (INHALATION) at 05:47

## 2019-01-01 RX ADMIN — BUDESONIDE AND FORMOTEROL FUMARATE DIHYDRATE 2 PUFF: 80; 4.5 AEROSOL RESPIRATORY (INHALATION) at 18:19

## 2019-01-01 RX ADMIN — ATORVASTATIN CALCIUM 20 MG: 40 TABLET, FILM COATED ORAL at 20:31

## 2019-01-01 RX ADMIN — INSULIN GLARGINE 30 UNITS: 100 INJECTION, SOLUTION SUBCUTANEOUS at 20:31

## 2019-01-01 RX ADMIN — METOPROLOL SUCCINATE 200 MG: 100 TABLET, FILM COATED, EXTENDED RELEASE ORAL at 18:20

## 2019-01-01 RX ADMIN — LEVOTHYROXINE SODIUM 175 MCG: 150 TABLET ORAL at 05:45

## 2019-01-01 RX ADMIN — GABAPENTIN 600 MG: 300 CAPSULE ORAL at 20:32

## 2019-01-01 RX ADMIN — AMITRIPTYLINE HYDROCHLORIDE 20 MG: 10 TABLET, FILM COATED ORAL at 20:31

## 2019-01-01 ASSESSMENT — GAIT ASSESSMENTS
GAIT LEVEL OF ASSIST: MINIMAL ASSIST
ASSISTIVE DEVICE: FRONT WHEEL WALKER
DISTANCE (FEET): 15
DEVIATION: BRADYKINETIC;STEP TO;SHUFFLED GAIT

## 2019-01-01 ASSESSMENT — COGNITIVE AND FUNCTIONAL STATUS - GENERAL
MOBILITY SCORE: 9
HELP NEEDED FOR BATHING: A LOT
PERSONAL GROOMING: A LITTLE
TURNING FROM BACK TO SIDE WHILE IN FLAT BAD: UNABLE
CLIMB 3 TO 5 STEPS WITH RAILING: TOTAL
WALKING IN HOSPITAL ROOM: A LITTLE
DRESSING REGULAR LOWER BODY CLOTHING: A LOT
TOILETING: A LOT
SUGGESTED CMS G CODE MODIFIER MOBILITY: CM
SUGGESTED CMS G CODE MODIFIER DAILY ACTIVITY: CK
MOVING TO AND FROM BED TO CHAIR: UNABLE
MOVING FROM LYING ON BACK TO SITTING ON SIDE OF FLAT BED: UNABLE
DAILY ACTIVITIY SCORE: 15
DRESSING REGULAR UPPER BODY CLOTHING: A LOT
STANDING UP FROM CHAIR USING ARMS: A LOT

## 2019-01-01 ASSESSMENT — PAIN SCALES - GENERAL
PAINLEVEL_OUTOF10: 4
PAINLEVEL_OUTOF10: 3
PAINLEVEL_OUTOF10: 5
PAINLEVEL_OUTOF10: 4
PAINLEVEL_OUTOF10: 6
PAINLEVEL_OUTOF10: 4
PAINLEVEL_OUTOF10: 5
PAINLEVEL_OUTOF10: 6
PAINLEVEL_OUTOF10: 3
PAINLEVEL_OUTOF10: 5

## 2019-01-01 NOTE — PROGRESS NOTES
Neurosurgery Progress Note    Subjective:  No acute events. Sitting up to side of the bed in chair, brace on, playing cards with visitor. Raymon merlos'sumit, pt requires 2 person assist to bathroom, voiding. Pain controlled at this time.       Exam:  A&O, fluent speech  Strength: right ip/df/pf 5/5, left ip 4/5, df/pf 5/5  Dressing c&d  Pain controlled.   Eating, drinking. Denies N/V  Voiding. +BM    BP  Min: 132/45  Max: 163/58  Pulse  Av.8  Min: 65  Max: 76  Resp  Av.5  Min: 17  Max: 18  Temp  Av.4 °C (97.6 °F)  Min: 36.3 °C (97.3 °F)  Max: 36.8 °C (98.3 °F)  SpO2  Av.5 %  Min: 92 %  Max: 96 %    No Data Recorded    Recent Labs      18   0238   WBC  10.9*   RBC  3.65*   HEMOGLOBIN  11.2*   HEMATOCRIT  35.0*   MCV  95.9   MCH  30.7   MCHC  32.0*   RDW  46.1   PLATELETCT  207   MPV  10.8     Recent Labs      18   0237   SODIUM  138   POTASSIUM  4.3   CHLORIDE  106   CO2  27   GLUCOSE  151*   BUN  13   CREATININE  0.96   CALCIUM  8.8               Intake/Output       18 07 - 19 0659 19 07 - 19 0659      [id number] Total  2124-8731 Total       Intake    P.O.  --  100 100  --  -- --    P.O. -- 100 100 -- -- --    Total Intake -- 100 100 -- -- --       Output    Urine  --  -- --  --  -- --    Number of Times Voided -- 3 x 3 x -- -- --    Drains   30  --  -- --    Output (mL) ([REMOVED] Closed/Suction Drain 1 Posterior Back Hemovac) 30  30 -- -- --    Stool  --  -- --  --  -- --    Number of Times Stooled 1 x -- 1 x 0 x -- 0 x    Total Output 30  30 -- -- --       Net I/O     -30 100 70 -- -- --            Intake/Output Summary (Last 24 hours) at 19 1332  Last data filed at 19 0600   Gross per 24 hour   Intake              100 ml   Output                0 ml   Net              100 ml            • amitriptyline  20 mg QHS   • amLODIPine  2.5 mg QHS   • atorvastatin  20 mg QHS   • gabapentin  200 mg QAM   • gabapentin  600 mg QHS    • insulin glargine  30 Units Nightly   • levothyroxine  175 mcg AM ES   • metFORMIN  500 mg BID WITH MEALS   • metoprolol SR  200 mg BID   • oxyCODONE-acetaminophen  1-2 Tab Q4HRS PRN   • SITagliptin  100 mg QAM   • HYDROmorphone  2 mg Q3HRS PRN   • budesonide-formoterol  2 Puff BID   • Pharmacy Consult Request  1 Each PRN   • MD ALERT...DO NOT ADMINISTER NSAIDS or ASPIRIN unless ORDERED By Neurosurgery  1 Each PRN   • docusate sodium  100 mg BID   • senna-docusate  1 Tab Nightly   • senna-docusate  1 Tab Q24HRS PRN   • polyethylene glycol/lytes  1 Packet BID PRN   • magnesium hydroxide  30 mL QDAY PRN   • bisacodyl  10 mg Q24HRS PRN   • fleet  1 Each Once PRN   • 0.9 % NaCl with KCl 20 mEq 1,000 mL   Continuous   • diphenhydrAMINE  25 mg Q6HRS PRN    Or   • diphenhydrAMINE  25 mg Q6HRS PRN   • ondansetron  4 mg Q4HRS PRN   • ondansetron  4 mg Q4HRS PRN   • tizanidine  2 mg TID PRN   • hydrALAZINE  10 mg Q HOUR PRN   • benzocaine-menthol  1 Lozenge Q2HRS PRN   • calcium carbonate  500 mg BID       Assessment and Plan:  POD # 4 L 3-5 D/F    Prophylactic anticoagulation: no         Start date/time: tbd    Plan:   Encourage IS  Wean Oxygen  PT- mobilize patient  Brace on when OOB>5min  Rehab vs SNF

## 2019-01-01 NOTE — PROGRESS NOTES
Bedside report recieved, accepted care. Pt is A&Ox4, reports pain, medicated per mar. Reports baseline numbness/tingling bilateral feet and hands. On 2.5 lpm O2 via nc.    POC discussed. No further needs at this time.    in use. Bed alarm on. Bed locked and in bed in low position, call light and belongings within reach, upper rails up. Treaded socks on.

## 2019-01-01 NOTE — THERAPY
"Physical Therapy Treatment completed.   Bed Mobility:  Supine to Sit:  (pt up in chair prior to tx )  Transfers: Sit to Stand: Maximal Assist  Gait: Level Of Assist: Minimal Assist with Front-Wheel Walker 15ft       Plan of Care: Will benefit from Physical Therapy 5 times per week  Discharge Recommendations: Equipment: Will Continue to Assess for Equipment Needs. Post-acute therapy Discharge to a transitional care facility for continued skilled therapy services.     See \"Rehab Therapy-Acute\" Patient Summary Report for complete documentation.     Pt pleasant and agreeable to therapy session. Pt continues to present below baseline with impairments in strength, transfers and activity tolerance. Pt required maxA to stand from chair and it took 4 attempts before able to reach full upright position. Pt LLE did buckle during those attempts. Pt was able to increase total gait distance to 15ft with fww, Celestino and chair follow for safety. Pt demonstrates slow luigi, decreased step length and height and required assist for fww management. During gait pt did not demonstrate knee buckling. Pt able to cite spinal precuations throughout tx session. She required modA for BLE getting back into bed via log rolling. Pt at this time will benefit from continued acute skilled therapy to progress mobility. At this time continue to recommend post acute placement prior to dc home .  "

## 2019-01-01 NOTE — THERAPY
"Occupational Therapy Treatment completed with focus on ADLs, ADL transfers and patient education.  Functional Status:  Pt seen for OT tx. Up in chair at beginning of session. Max A sit > stand. Attempted to stand 4x, pt having high levels of pain. Once upright pt able to tolerated amb in room w/ FWW and min A for walker management. Completed toilet transfer w/ min A d/t raised height. Max A LB dressing. Discussed donning/doffing LSO when OOB > 5 min. Pt transferred BTB w/ min A. Mod A to return to supine for LE assistance. Pt pleasant and cooperative. Pt motivated to regain strength, endurance and independence in ADLs and ADL transfers.   Plan of Care: Will benefit from Occupational Therapy 4 times per week  Discharge Recommendations:  Equipment Will Continue to Assess for Equipment Needs.     See \"Rehab Therapy-Acute\" Patient Summary Report for complete documentation.   "

## 2019-01-01 NOTE — CARE PLAN
Problem: Communication  Goal: The ability to communicate needs accurately and effectively will improve  Outcome: PROGRESSING AS EXPECTED  POC discussed, all questions answered. Pt is able to make needs known to staff.     Problem: Venous Thromboembolism (VTW)/Deep Vein Thrombosis (DVT) Prevention:  Goal: Patient will participate in Venous Thrombosis (VTE)/Deep Vein Thrombosis (DVT)Prevention Measures  Outcome: PROGRESSING AS EXPECTED  SCD's in use.

## 2019-01-02 LAB
GLUCOSE BLD-MCNC: 103 MG/DL (ref 65–99)
GLUCOSE BLD-MCNC: 104 MG/DL (ref 65–99)
GLUCOSE BLD-MCNC: 175 MG/DL (ref 65–99)

## 2019-01-02 PROCEDURE — 99223 1ST HOSP IP/OBS HIGH 75: CPT | Performed by: PHYSICAL MEDICINE & REHABILITATION

## 2019-01-02 PROCEDURE — 770001 HCHG ROOM/CARE - MED/SURG/GYN PRIV*

## 2019-01-02 PROCEDURE — 700112 HCHG RX REV CODE 229: Performed by: NURSE PRACTITIONER

## 2019-01-02 PROCEDURE — 82962 GLUCOSE BLOOD TEST: CPT

## 2019-01-02 PROCEDURE — 97116 GAIT TRAINING THERAPY: CPT

## 2019-01-02 PROCEDURE — 97530 THERAPEUTIC ACTIVITIES: CPT

## 2019-01-02 PROCEDURE — 700102 HCHG RX REV CODE 250 W/ 637 OVERRIDE(OP): Performed by: NURSE PRACTITIONER

## 2019-01-02 PROCEDURE — A9270 NON-COVERED ITEM OR SERVICE: HCPCS | Performed by: NURSE PRACTITIONER

## 2019-01-02 PROCEDURE — 302196 LINEN, HYPOALLERGENIC: Performed by: NEUROLOGICAL SURGERY

## 2019-01-02 PROCEDURE — 97535 SELF CARE MNGMENT TRAINING: CPT

## 2019-01-02 RX ADMIN — ANTACID TABLETS 500 MG: 500 TABLET, CHEWABLE ORAL at 17:46

## 2019-01-02 RX ADMIN — DOCUSATE SODIUM 100 MG: 100 CAPSULE, LIQUID FILLED ORAL at 05:14

## 2019-01-02 RX ADMIN — METFORMIN HYDROCHLORIDE 500 MG: 500 TABLET ORAL at 08:45

## 2019-01-02 RX ADMIN — OXYCODONE AND ACETAMINOPHEN 1 TABLET: 5; 325 TABLET ORAL at 21:11

## 2019-01-02 RX ADMIN — AMLODIPINE BESYLATE 2.5 MG: 2.5 TABLET ORAL at 21:11

## 2019-01-02 RX ADMIN — OXYCODONE AND ACETAMINOPHEN 1 TABLET: 5; 325 TABLET ORAL at 10:38

## 2019-01-02 RX ADMIN — METFORMIN HYDROCHLORIDE 500 MG: 500 TABLET ORAL at 17:44

## 2019-01-02 RX ADMIN — ATORVASTATIN CALCIUM 20 MG: 40 TABLET, FILM COATED ORAL at 21:10

## 2019-01-02 RX ADMIN — OXYCODONE AND ACETAMINOPHEN 1 TABLET: 5; 325 TABLET ORAL at 05:14

## 2019-01-02 RX ADMIN — ANTACID TABLETS 500 MG: 500 TABLET, CHEWABLE ORAL at 05:14

## 2019-01-02 RX ADMIN — METOPROLOL SUCCINATE 200 MG: 100 TABLET, FILM COATED, EXTENDED RELEASE ORAL at 17:44

## 2019-01-02 RX ADMIN — GABAPENTIN 200 MG: 100 CAPSULE ORAL at 05:14

## 2019-01-02 RX ADMIN — STANDARDIZED SENNA CONCENTRATE AND DOCUSATE SODIUM 1 TABLET: 8.6; 5 TABLET, FILM COATED ORAL at 21:10

## 2019-01-02 RX ADMIN — DOCUSATE SODIUM 100 MG: 100 CAPSULE, LIQUID FILLED ORAL at 17:44

## 2019-01-02 RX ADMIN — BUDESONIDE AND FORMOTEROL FUMARATE DIHYDRATE 2 PUFF: 80; 4.5 AEROSOL RESPIRATORY (INHALATION) at 05:18

## 2019-01-02 RX ADMIN — TIZANIDINE 2 MG: 4 TABLET ORAL at 08:45

## 2019-01-02 RX ADMIN — INSULIN GLARGINE 30 UNITS: 100 INJECTION, SOLUTION SUBCUTANEOUS at 21:13

## 2019-01-02 RX ADMIN — BUDESONIDE AND FORMOTEROL FUMARATE DIHYDRATE 2 PUFF: 80; 4.5 AEROSOL RESPIRATORY (INHALATION) at 17:44

## 2019-01-02 RX ADMIN — GABAPENTIN 600 MG: 300 CAPSULE ORAL at 21:11

## 2019-01-02 RX ADMIN — SITAGLIPTIN 100 MG: 100 TABLET, FILM COATED ORAL at 05:14

## 2019-01-02 RX ADMIN — METOPROLOL SUCCINATE 200 MG: 100 TABLET, FILM COATED, EXTENDED RELEASE ORAL at 05:15

## 2019-01-02 RX ADMIN — AMITRIPTYLINE HYDROCHLORIDE 20 MG: 10 TABLET, FILM COATED ORAL at 21:11

## 2019-01-02 RX ADMIN — LEVOTHYROXINE SODIUM 175 MCG: 150 TABLET ORAL at 05:14

## 2019-01-02 ASSESSMENT — GAIT ASSESSMENTS
DISTANCE (FEET): 50
GAIT LEVEL OF ASSIST: CONTACT GUARD ASSIST
ASSISTIVE DEVICE: FRONT WHEEL WALKER
DEVIATION: BRADYKINETIC;SHUFFLED GAIT

## 2019-01-02 ASSESSMENT — COGNITIVE AND FUNCTIONAL STATUS - GENERAL
HELP NEEDED FOR BATHING: A LOT
PERSONAL GROOMING: A LITTLE
TURNING FROM BACK TO SIDE WHILE IN FLAT BAD: UNABLE
STANDING UP FROM CHAIR USING ARMS: A LOT
DRESSING REGULAR UPPER BODY CLOTHING: A LITTLE
MOBILITY SCORE: 9
DRESSING REGULAR LOWER BODY CLOTHING: A LOT
SUGGESTED CMS G CODE MODIFIER DAILY ACTIVITY: CK
SUGGESTED CMS G CODE MODIFIER MOBILITY: CM
MOVING TO AND FROM BED TO CHAIR: UNABLE
CLIMB 3 TO 5 STEPS WITH RAILING: TOTAL
DAILY ACTIVITIY SCORE: 17
WALKING IN HOSPITAL ROOM: A LITTLE
TOILETING: A LITTLE
MOVING FROM LYING ON BACK TO SITTING ON SIDE OF FLAT BED: UNABLE

## 2019-01-02 ASSESSMENT — PAIN SCALES - GENERAL
PAINLEVEL_OUTOF10: 6
PAINLEVEL_OUTOF10: 3
PAINLEVEL_OUTOF10: 5
PAINLEVEL_OUTOF10: 5
PAINLEVEL_OUTOF10: 3
PAINLEVEL_OUTOF10: 3
PAINLEVEL_OUTOF10: 6
PAINLEVEL_OUTOF10: 3

## 2019-01-02 NOTE — CONSULTS
Physical Medicine and Rehabilitation Consultation         Initial Consult      Date of Consultation: 1/2/2019  Consulting provider: Michael Geronimo D.O.  Reason for consultation: assess for acute inpatient rehab appropriateness  Consulting physician Tamara Gasca    Chief complaint: low back pain    HPI: The patient is a 73 y.o. female with a past medical history of diabetes, obesity, hypertension, low back pain, status post multiple surgeries, presented on 12/28/2018  9:56 AM with neurogenic claudication with recurrent lumbar stenosis at L3-L5, status post L4 and L5 redo laminectomy, fixation of L3-L5 by Dr. Sheldon on 12/28.       She is status post L1 through L4 decompressive laminectomy on 01/2016, with wash out of the wound on March 2016, status post redo of lumbar laminectomy L4/L5 on 10/2016,     She complains about new pain in her left thigh, described as sharp 4-10/10 constant pain, worse with movement, improved with rest, no radiation past the thigh, no radiation up to the back, started after surgery. No loss of bowel or bladder control, no other associated symptoms. Previously using elavil which has improved the pain and sleep as well as gabapentin which has improved her pain.     The pain that was present before the surgery has improved, currently 0/10 was previously aching sensation going from back to bellow the knee.     She has gone to acute rehab in the past with good results  She was not on home O2 before this hospitalization, had altered smell before this hospitalization.      ROS:  Gen: No fatigue, confusion, no significant weight loss  Eyes: no blurry vision, double vision or pain in eyes  ENT: no changes in hearing, runny nose, nose bleeds, sinus pain  Endo: no episodes of low blood sugar  CV: No CP, palpitations,   Lungs: + shortness of breath, no changes in secretions, changes in cough, pain with coughing  Abd: No abd pain, nausea, vomiting, pain with eating  : no blood in urine,  "suprapubic pain  Ext/MSK: No swelling in legs, asymmetric atrophy  Neuro: she feels like she is getting stronger, no altered sensation  Skin: no new ulcers/skin breakdown appreciated by patient  Mood: No changes in mood today, increase in depression or anxiety  Heme: no bruising, or bleeding  Allergy: No recent allergies    PMH:  Past Medical History:   Diagnosis Date   • Anesthesia 2016    \"took a long time to wake up, ended up on a vent after neck surg\"     • Arthritis     fingers, back   • Asthma     uses inhaler   • Breath shortness     with exertion   • Bronchitis 1998   • Congestive heart failure (HCC)    • Diabetes (HCC)     oral and insulin   • Encounter for renal dialysis 07/2014    r/t ARF, denies at this time   • Heart murmur    • Heart valve disease    • High cholesterol    • Hypertension    • Hypothyroid    • Low back pain     back and leg 8/10 at worst    • Numbness and tingling in hands     and arms   • Pain 3/4/16    3/10 lower back   • Renal disorder     AFR in 7-2014, resolved   • Snoring    • Unspecified cataract     removed bilat   • Unspecified urinary incontinence     wears pad   • Urinary bladder disorder        PSH:  Past Surgical History:   Procedure Laterality Date   • LUMBAR FUSION POSTERIOR  12/28/2018    Procedure: LUMBAR FUSION POSTERIOR- L3-5 REDO;  Surgeon: Avel Sheldon M.D.;  Location: [facility];  Service: Neurosurgery   • LUMBAR DECOMPRESSION  12/28/2018    Procedure: LUMBAR DECOMPRESSION;  Surgeon: Avel Sheldon M.D.;  Location: [facility];  Service: Neurosurgery   • CERVICAL FUSION POSTERIOR  10/13/2016    Procedure: CERVICAL FUSION POSTERIOR - EXTENSION TO C3-4 W/PLACEMENT OF LATERAL MASS SCREWS AT C3;  Surgeon: Alvaro Chaudhry M.D.;  Location: [facility];  Service:    • CERVICAL DECOMPRESSION POSTERIOR  10/13/2016    Procedure: CERVICAL DECOMPRESSION POSTERIOR - RE=DO C3-4;  Surgeon: Alvaro Chaudhry M.D.;  Location: [facility];  " Service:    • LUMBAR LAMINECTOMY DISKECTOMY  10/13/2016    Procedure: LUMBAR LAMINECTOMY DISKECTOMY - L4-5;  Surgeon: Alvaro Chaudhry M.D.;  Location: [facility];  Service:    • IRRIGATION & DEBRIDEMENT NEURO  3/10/2016    Procedure: IRRIGATION & DEBRIDEMENT NEURO AND WOUND VAC PLACEMENT;  Surgeon: Alvaro Chaudhry M.D.;  Location: [facility];  Service:    • LUMBAR LAMINECTOMY DISKECTOMY  1/22/2016    Procedure: LUMBAR LAMINECTOMY DISKECTOMY L1-5;  Surgeon: Alvaro Chaudhry M.D.;  Location: [facility];  Service:    • CERVICAL FUSION POSTERIOR  9/16/2015    Procedure: CERVICAL FUSION POSTERIOR C3-T1 LATERAL MASS SCREWS;  Surgeon: Alvaro Chaudhry M.D.;  Location: [facility];  Service:    • CERVICAL LAMINECTOMY POSTERIOR  9/16/2015    Procedure: CERVICAL LAMINECTOMY POSTERIOR C3-T1;  Surgeon: Alvaro Chaudhry M.D.;  Location: SUR[facility];  Service:    • CATARACT EXTRACTION WITH IOL Bilateral    • GYN SURGERY      hysterectomy    • OTHER ABDOMINAL SURGERY      appendix removed   • TONSILLECTOMY         FHX:  History reviewed. No pertinent family history.  No history of kidney disease    Medications:  Current Facility-Administered Medications   Medication Dose   • amitriptyline (ELAVIL) tablet 20 mg  20 mg   • amLODIPine (NORVASC) tablet 2.5 mg  2.5 mg   • atorvastatin (LIPITOR) tablet 20 mg  20 mg   • gabapentin (NEURONTIN) capsule 200 mg  200 mg   • gabapentin (NEURONTIN) capsule 600 mg  600 mg   • insulin glargine (LANTUS) injection 30 Units  30 Units   • levothyroxine (SYNTHROID) tablet 175 mcg  175 mcg   • metFORMIN (GLUCOPHAGE) tablet 500 mg  500 mg   • metoprolol SR (TOPROL XL) tablet 200 mg  200 mg   • oxyCODONE-acetaminophen (PERCOCET) 5-325 MG per tablet 1-2 Tab  1-2 Tab   • SITagliptin (JANUVIA) tablet 100 mg  100 mg   • HYDROmorphone (DILAUDID) tablet 2 mg  2 mg   • budesonide-formoterol (SYMBICORT) 80-4.5 MCG/ACT inhaler 2 Puff  2 Puff   • Pharmacy  "Consult Request ...Pain Management Review 1 Each  1 Each   • MD ALERT...DO NOT ADMINISTER NSAIDS or ASPIRIN unless ORDERED By Neurosurgery 1 Each  1 Each   • docusate sodium (COLACE) capsule 100 mg  100 mg   • senna-docusate (PERICOLACE or SENOKOT S) 8.6-50 MG per tablet 1 Tab  1 Tab   • senna-docusate (PERICOLACE or SENOKOT S) 8.6-50 MG per tablet 1 Tab  1 Tab   • polyethylene glycol/lytes (MIRALAX) PACKET 1 Packet  1 Packet   • magnesium hydroxide (MILK OF MAGNESIA) suspension 30 mL  30 mL   • bisacodyl (DULCOLAX) suppository 10 mg  10 mg   • fleet enema 133 mL  1 Each   • 0.9 % NaCl with KCl 20 mEq infusion     • diphenhydrAMINE (BENADRYL) tablet/capsule 25 mg  25 mg    Or   • diphenhydrAMINE (BENADRYL) injection 25 mg  25 mg   • ondansetron (ZOFRAN) syringe/vial injection 4 mg  4 mg   • ondansetron (ZOFRAN ODT) dispertab 4 mg  4 mg   • tizanidine (ZANAFLEX) tablet 2 mg  2 mg   • hydrALAZINE (APRESOLINE) injection 10 mg  10 mg   • benzocaine-menthol (CEPACOL) lozenge 1 Lozenge  1 Lozenge   • calcium carbonate (TUMS) chewable tab 500 mg  500 mg       Allergies:  Allergies   Allergen Reactions   • Codeine Itching     \"My head itches.\"   • Other Environmental Rash     Requires Hypoallergenic Linen   • Tape Rash     Paper Tape ok       Social Hx:  Pre-morbidly, this patient lived in a two level home with Two  steps to enter, alone and able to care for self.   Tobacco: none  Alcohol: none  Drugs: none    Prior level of function:   Mod Independent, using FWW    Current level of function:  The patient was evaluated by acute care Physical Therapy and Occupational Therapy; currently requiring moderate assistance for mobility and maximal assistance for ADLs,     Physical Therapy Treatment completed.   Bed Mobility:  Supine to Sit:  (up in chair )  Transfers: Sit to Stand: Moderate Assist  Gait: Level Of Assist: Close Contact Guard Assist with Front-Wheel Walker for only 50ft.    Occupational Therapy Treatment completed " "with focus on ADLs, ADL transfers and patient education.  Functional Status:  Pt seen for OT tx. Mod A sit > stand from low surface and c/o pain in L hip. Amb to BR w/ FWW and CGA for balance and safety. Completed toilet transfer w/ min A, toilet w/ raised height requiring less assistance for sit > stand from low surface. Continues to require max A to don LSO. Max A LB dressing. Pt able to appropriately maintain spinal precautions during session. Pt demonstrated tolerance for light grooming at the sink but continues to be limited by decreased activity tolerance and pain. Pt pleasant and cooperative. Pt motivated to regain strength, endurance and independence in ADLs and ADL transfers.     Physical Exam:  Vitals: Blood pressure 127/47, pulse 62, temperature 36.4 °C (97.6 °F), temperature source Temporal, resp. rate 17, height 1.676 m (5' 6\"), weight 120.7 kg (266 lb 1.5 oz), SpO2 95 %, not currently breastfeeding.  Gen: NAD, sitting comfortably in chair  HEENT: NC/AT, PERRLA, moist mucous membranes  Cardio: RRR, no mumurs  Pulm: CTAB, with normal respiratory effort  Abd: Soft NTND, active bowel sounds,   Ext: No peripheral edema. No calf tenderness. No clubbing/cyanosis. +dorsal pedalis pulses bilaterally.    Mental status:  A&Ox4 (person, place, date, situation) answers questions appropriately follows commands  Speech: fluent, no aphasia or dysarthria    CRANIAL NERVES:  2,3: visual acuity grossly intact, PERRL  3,4,6: EOMI bilaterally, no nystagmus or diplopia  5: sensation intact to light touch bilaterally and symmetric  7: no facial asymmetry  8: hearing grossly intact  9,10: symmetric palate elevation  11: SCM/Trapezius strength 5/5 bilaterally  12: tongue protrudes midline    Motor:      Shoulder flexors:  Bilateral -  5/5  Elbow flexors:  Bilateral -  5/5  Wrist extensors:  Bilateral -  5/5  Elbow extensors:  Bilateral -  5/5  Finger flexors:  Bilateral -  5/5  Finger abductors:  Bilateral -  5/5  Hip flexors: "  Right -  1/5, Left -  1/5  Knee ext:  Right -  3/5, Left -  4/5  Dorsiflexors:  Right -  3/5, Left -  4/5  EHL:  Right -  4/5, Left -  4/5  Plantar flexors:  Right -  4/5, Left -  4/5   Negative Pronator drift bilaterally    Sensory:   intact to light touch through out      DTRs: 1+ in bilateral biceps, triceps, brachioradialis, 1+ in bilateral patellar and achilles tendons  No clonus at bilateral ankles  Negative babinski b/l  Negative Coulter b/l     Tone: no spasticity noted, no cogwheeling noted    Coordination:   intact finger to nose bilaterally      Labs:  No results for input(s): RBC, HEMOGLOBIN, HEMATOCRIT, PLATELETCT, PROTHROMBTM, APTT, INR, IRON, FERRITIN, TOTIRONBC in the last 72 hours.      Recent Results (from the past 24 hour(s))   ACCU-CHEK GLUCOSE    Collection Time: 01/01/19  8:22 PM   Result Value Ref Range    Glucose - Accu-Ck 195 (H) 65 - 99 mg/dL   ACCU-CHEK GLUCOSE    Collection Time: 01/02/19  5:19 AM   Result Value Ref Range    Glucose - Accu-Ck 104 (H) 65 - 99 mg/dL       ASSESSMENT:  L3-5 stenosis: Neurogenic claudication, status post L3-L5 repeat laminotomy with fixation by Dr. Sheldon on 12/28  - Strength is improving since surgery.   - pain has improved.   - LSO when OOB  - staples in place for 14 days, spinal precautions    Pain: sharp pain left thigh  -Amitriptyline 20 mg nightly  -Gabapentin 200/600 mg, increase to 400/600mg, monitor for fatigue  -Percocet 1 tablet every 4 hours as needed pain, currently taking 2-3 tablets/day    HTN: monitor BP when OOB, 127-163/47-58  - check orthostatic BP    O2 requirements:  - check CXR, check VC, use IS  - cont O2 and wean as tolerated, monitor O2 during activity    Rehabilitation: Impaired ADLs and mobility, LILIAM lumbar radiculopathy, incomplete  - pt would benefit from and tolerate 3 hours of therapy per day with plan to discharge home with house mate able to help with assistance at home, son is available as well.     Discussed with pt,  summarized hospitalization and care, options for next step of care    Discussed with team about recommendations      Discussed with patient about recommendations for and plan for rehabilitation as follows. Patient with multiple co-morbidities(including but not limited to low back pain, thigh pain, s/p laminectomy, HTN, DM, oxygen requirements, obesity); with functional deficits in mobility/self-care, and Moderate de-conditioning.     Pre-morbidly, this patient lived in a two level home with Two  steps to enter, alone and able to care for self. The patient was evaluated by acute care Physical Therapy and Occupational Therapy; currently requiring moderate assistance for mobility and maximal assistance for ADLs    The patient is a(n) Very Good candidate for an acute inpatient rehabilitation program with a coordinated program of care at an intensity and frequency not available at a lower level of care.     Note: if patient continues to progress while waiting for medical clearance, and no longer requires 2 of of 3 therapy services (PT/OT/SLP) at a CGA/Minimal assistance level, patient will no longer need acute inpatient rehabilitation.    This recommendation is substantiated by the patient's current medical condition with intervention and assessment of medical issues requiring an acute level of care for patient's safety and maximum outcome. A coordinated program of care will be provided by an interdisciplinary team including physical therapy, occupational therapy, hospitalist, physiatry, rehab nursing and rehab psychology. Rehab goals include improved mobility, self-care management, strength and conditioning/endurance, pain management, bowel and bladder management, mood and affect, and safety with independent home management including caregiver training.     Estimated length of stay is approximately 10-14 days. Rehab potential: Very Good. Disposition: to pre-morbid independent living setting with supportive care of  patient's family. We will continue to follow with you in anticipation of discharge to acute inpatient rehabilitation when medically stable to do so at the discretion of her  attending physician. Thank you for allowing us to participate in her care. Please call with any questions regarding this recommendation.    Michael Geronimo D.O.  Physical Medicine and Rehabilitation

## 2019-01-02 NOTE — THERAPY
"Physical Therapy Treatment completed.   Bed Mobility:  Supine to Sit:  (up in chair )  Transfers: Sit to Stand: Moderate Assist  Gait: Level Of Assist: Close Contact Guard Assist with Front-Wheel Walker for only 50ft.        Plan of Care: Will benefit from Physical Therapy 5 times per week  Discharge Recommendations: Equipment: Will Continue to Assess for Equipment Needs. Recommend inpatient transitional care services for continued physical therapy services.        See \"Rehab Therapy-Acute\" Patient Summary Report for complete documentation.     Pt pleasant and motivated with therapy session. Pt progressed with therapy today. She required modA for sit to stand from low surface. She did not require mulitple attempts to reach full upright position which is an improvement from yesterday. No knee buckling noted during sit to stand transfer but she did report with nursing staff left knee will occassionally buckle. Pt able to increase total gait distacne to 50ft with fww and close CGA for safety and balance. Pt demonstrated decreased step lenght and height with a slow luigi. Pt continues to present below her baseline therefore will benefit from continued acute skilled therapy. Continue to recommend post acute placement at [facility].   "

## 2019-01-02 NOTE — CARE PLAN
Problem: Safety  Goal: Will remain free from injury  Outcome: PROGRESSING AS EXPECTED  Fall risk assessed using Dean-Anthony scale. Fall precautions in place. Pt calls appropriately.     Problem: Pain Management  Goal: Pain level will decrease to patient's comfort goal  Outcome: PROGRESSING AS EXPECTED  Pt medicated with PO PRN percocet per mar with positive results.

## 2019-01-02 NOTE — PROGRESS NOTES
Neurosurgery Progress Note    Subjective:  No acute events. Pt states she is doing better today and walking some, she does mention some left ant thigh pain, remains on O2.       Exam:  A&O, fluent speech  Strength: df/pf 5/5  Dressing c&d  Pain controlled.   Eating, drinking. Denies N/V  Voiding. +BM    BP  Min: 127/47  Max: 150/71  Pulse  Av.5  Min: 62  Max: 69  Resp  Av.5  Min: 16  Max: 17  Temp  Av.4 °C (97.6 °F)  Min: 36.3 °C (97.3 °F)  Max: 36.7 °C (98 °F)  SpO2  Av %  Min: 94 %  Max: 96 %    No Data Recorded                      Intake/Output       19 - 19 - 1959      0-0659 Total 1900-0659 Total       Intake    P.O.  --  100 100  --  -- --    P.O. -- 100 100 -- -- --    Total Intake -- 100 100 -- -- --       Output    Urine  --  -- --  --  -- --    Number of Times Voided 3 x 1 x 4 x 1 x -- 1 x    Stool  --  -- --  --  -- --    Number of Times Stooled 0 x -- 0 x -- -- --    Total Output -- -- -- -- -- --       Net I/O     -- 100 100 -- -- --            Intake/Output Summary (Last 24 hours) at 19 1055  Last data filed at 19 0600   Gross per 24 hour   Intake              100 ml   Output                0 ml   Net              100 ml            • amitriptyline  20 mg QHS   • amLODIPine  2.5 mg QHS   • atorvastatin  20 mg QHS   • gabapentin  200 mg QAM   • gabapentin  600 mg QHS   • insulin glargine  30 Units Nightly   • levothyroxine  175 mcg AM ES   • metFORMIN  500 mg BID WITH MEALS   • metoprolol SR  200 mg BID   • oxyCODONE-acetaminophen  1-2 Tab Q4HRS PRN   • SITagliptin  100 mg QAM   • HYDROmorphone  2 mg Q3HRS PRN   • budesonide-formoterol  2 Puff BID   • Pharmacy Consult Request  1 Each PRN   • MD ALERT...DO NOT ADMINISTER NSAIDS or ASPIRIN unless ORDERED By Neurosurgery  1 Each PRN   • docusate sodium  100 mg BID   • senna-docusate  1 Tab Nightly   • senna-docusate  1 Tab Q24HRS PRN   • polyethylene  glycol/lytes  1 Packet BID PRN   • magnesium hydroxide  30 mL QDAY PRN   • bisacodyl  10 mg Q24HRS PRN   • fleet  1 Each Once PRN   • 0.9 % NaCl with KCl 20 mEq 1,000 mL   Continuous   • diphenhydrAMINE  25 mg Q6HRS PRN    Or   • diphenhydrAMINE  25 mg Q6HRS PRN   • ondansetron  4 mg Q4HRS PRN   • ondansetron  4 mg Q4HRS PRN   • tizanidine  2 mg TID PRN   • hydrALAZINE  10 mg Q HOUR PRN   • benzocaine-menthol  1 Lozenge Q2HRS PRN   • calcium carbonate  500 mg BID       Assessment and Plan:  POD # 5 L 3-5 D/F  Prophylactic anticoagulation: no         Start date/time: tbd    Plan:   Encourage IS  Wean Oxygen  PT- mobilize patient  Brace on when OOB>5min  Rehab vs SNF

## 2019-01-02 NOTE — CARE PLAN
Problem: Mobility  Goal: Risk for activity intolerance will decrease  Pt ambulates with x2 assist to bathroom.  Tires easily and is fearful when standing up.

## 2019-01-02 NOTE — DISCHARGE PLANNING
Anticipated Discharge Disposition:   Per Dr. Geronimo, pt is accepted by acute rehab    Action:   Met with pt and assessment has been completed.   Dr. Geronimo has accepted the pt and they will transfer today.   LVM for Juan M re: time of .     Barriers to Discharge:   Pending confirmation of transport    Plan:   Follow up with Juan M at [facility].    Addendum:  Received a message from Mary Yousif that pt will be accepted at [facility] tomorrow.

## 2019-01-02 NOTE — DISCHARGE PLANNING
Forwarding to Physiatry Dr. Geronimo for consult per protocol. Will follow for PMR recommendation.

## 2019-01-02 NOTE — PROGRESS NOTES
Bedside report recieved, accepted care. Pt is A&Ox4, denies pain, baseline numbness/tingling bilateral feet and hands. On 2.5 lpm O2 via nc. Dressing lower back CDI.     POC discussed. No further needs at this time.   Bed alarm on. Bed locked and in bed in low position, call light and belongings within reach, upper rails up. Treaded socks on. SCD's in use.

## 2019-01-02 NOTE — THERAPY
"Occupational Therapy Treatment completed with focus on ADLs, ADL transfers and patient education.  Functional Status:  Pt seen for OT tx. Mod A sit > stand from low surface and c/o pain in L hip. Amb to BR w/ FWW and CGA for balance and safety. Completed toilet transfer w/ min A, toilet w/ raised height requiring less assistance for sit > stand from low surface. Continues to require max A to don LSO. Max A LB dressing. Pt able to appropriately maintain spinal precautions during session. Pt demonstrated tolerance for light grooming at the sink but continues to be limited by decreased activity tolerance and pain. Pt pleasant and cooperative. Pt motivated to regain strength, endurance and independence in ADLs and ADL transfers.   Plan of Care: Will benefit from Occupational Therapy 4 times per week  Discharge Recommendations:  Equipment Will Continue to Assess for Equipment Needs.     See \"Rehab Therapy-Acute\" Patient Summary Report for complete documentation.   "

## 2019-01-02 NOTE — DISCHARGE PLANNING
Care Transition Team Assessment    Information Source  Orientation : Oriented x 4  Who is responsible for making decisions for patient? : Patient         Elopement Risk  Legal Hold: No  Ambulatory or Self Mobile in Wheelchair: Yes  Disoriented: No  Psychiatric Symptoms: None  History of Wandering: No  Elopement this Admit: No  Vocalizing Wanting to Leave: No  Displays Behaviors, Body Language Wanting to Leave: No-Not at Risk for Elopement  Elopement Risk: Not at Risk for Elopement    Interdisciplinary Discharge Planning  Does Admitting Nurse Feel This Could be a Complex Discharge?: No  Primary Care Physician: Dr. Lombard  Lives with - Patient's Self Care Capacity: Friends  Patient or legal guardian wants to designate a caregiver (see row info): No  Support Systems: Friends / Neighbors  Housing / Facility: 2 Story House (but pt stays at one level)  Do You Take your Prescribed Medications Regularly: Yes  Able to Return to Previous ADL's: Future Time w/Therapy  Mobility Issues: Yes  Prior Services: None  Patient Expects to be Discharged to:: [facility]  Assistance Needed: Yes  Durable Medical Equipment: Walker    Discharge Preparedness  What is your plan after discharge?: Other (comment) ([facility] has accepted )  What are your discharge supports?: Other (comment) (friend)  Prior Functional Level: Ambulatory, Independent with Activities of Daily Living  Difficulity with ADLs: Walking  Difficulity with IADLs: Driving, Laundry    Functional Assesment  Prior Functional Level: Ambulatory, Independent with Activities of Daily Living    Finances  Financial Barriers to Discharge: No  Prescription Coverage: Yes    Vision / Hearing Impairment  Right Eye Vision: Impaired, Wears Glasses  Left Eye Vision: Impaired, Wears Glasses  Hearing Impairment: Hearing Device Not Available, Both Ears    Values / Beliefs / Concerns  Spiritual Requests During Hospitalization: No         Domestic Abuse  Have you ever been the victim of abuse or violence?:  No  Physical Abuse or Sexual Abuse: No  Verbal Abuse or Emotional Abuse: No  Possible Abuse Reported to:: Not Applicable    Psychological Assessment  History of Substance Abuse: None         Anticipated Discharge Information  Anticipated discharge disposition: Acute rehab

## 2019-01-02 NOTE — CARE PLAN
Problem: Bowel/Gastric:  Goal: Normal bowel function is maintained or improved  Pt had large BM yesterday.  Will continue to encourage taking stool softeners and laxatives PRN.

## 2019-01-03 ENCOUNTER — HOSPITAL ENCOUNTER (INPATIENT)
Facility: REHABILITATION | Age: 74
LOS: 9 days | DRG: 560 | End: 2019-01-12
Attending: PHYSICAL MEDICINE & REHABILITATION | Admitting: PHYSICAL MEDICINE & REHABILITATION
Payer: MEDICARE

## 2019-01-03 VITALS
HEART RATE: 56 BPM | HEIGHT: 66 IN | SYSTOLIC BLOOD PRESSURE: 125 MMHG | BODY MASS INDEX: 42.77 KG/M2 | DIASTOLIC BLOOD PRESSURE: 65 MMHG | RESPIRATION RATE: 18 BRPM | WEIGHT: 266.1 LBS | OXYGEN SATURATION: 92 % | TEMPERATURE: 98.1 F

## 2019-01-03 DIAGNOSIS — M48.062 SPINAL STENOSIS OF LUMBAR REGION WITH NEUROGENIC CLAUDICATION: ICD-10-CM

## 2019-01-03 PROBLEM — R52 PAIN: Status: ACTIVE | Noted: 2019-01-03

## 2019-01-03 PROBLEM — D64.9 ANEMIA: Status: ACTIVE | Noted: 2019-01-03

## 2019-01-03 PROBLEM — E78.2 MIXED HYPERLIPIDEMIA: Status: ACTIVE | Noted: 2019-01-03

## 2019-01-03 LAB
GLUCOSE BLD-MCNC: 82 MG/DL (ref 65–99)
GLUCOSE BLD-MCNC: 96 MG/DL (ref 65–99)

## 2019-01-03 PROCEDURE — 700112 HCHG RX REV CODE 229: Performed by: NURSE PRACTITIONER

## 2019-01-03 PROCEDURE — 82962 GLUCOSE BLOOD TEST: CPT | Mod: 91

## 2019-01-03 PROCEDURE — A9270 NON-COVERED ITEM OR SERVICE: HCPCS | Performed by: NURSE PRACTITIONER

## 2019-01-03 PROCEDURE — 700102 HCHG RX REV CODE 250 W/ 637 OVERRIDE(OP): Performed by: PHYSICAL MEDICINE & REHABILITATION

## 2019-01-03 PROCEDURE — 94760 N-INVAS EAR/PLS OXIMETRY 1: CPT

## 2019-01-03 PROCEDURE — 700102 HCHG RX REV CODE 250 W/ 637 OVERRIDE(OP): Performed by: NURSE PRACTITIONER

## 2019-01-03 PROCEDURE — 99223 1ST HOSP IP/OBS HIGH 75: CPT | Mod: AI | Performed by: PHYSICAL MEDICINE & REHABILITATION

## 2019-01-03 PROCEDURE — 770010 HCHG ROOM/CARE - REHAB SEMI PRIVAT*

## 2019-01-03 PROCEDURE — 82962 GLUCOSE BLOOD TEST: CPT

## 2019-01-03 PROCEDURE — A9270 NON-COVERED ITEM OR SERVICE: HCPCS | Performed by: PHYSICAL MEDICINE & REHABILITATION

## 2019-01-03 PROCEDURE — 700111 HCHG RX REV CODE 636 W/ 250 OVERRIDE (IP): Performed by: PHYSICAL MEDICINE & REHABILITATION

## 2019-01-03 RX ORDER — BUDESONIDE AND FORMOTEROL FUMARATE DIHYDRATE 80; 4.5 UG/1; UG/1
2 AEROSOL RESPIRATORY (INHALATION) 2 TIMES DAILY
Status: CANCELLED | OUTPATIENT
Start: 2019-01-03

## 2019-01-03 RX ORDER — GABAPENTIN 100 MG/1
200 CAPSULE ORAL EVERY MORNING
Status: CANCELLED | OUTPATIENT
Start: 2019-01-04

## 2019-01-03 RX ORDER — ALUMINA, MAGNESIA, AND SIMETHICONE 2400; 2400; 240 MG/30ML; MG/30ML; MG/30ML
20 SUSPENSION ORAL
Status: DISCONTINUED | OUTPATIENT
Start: 2019-01-03 | End: 2019-01-12 | Stop reason: HOSPADM

## 2019-01-03 RX ORDER — OXYCODONE HYDROCHLORIDE AND ACETAMINOPHEN 5; 325 MG/1; MG/1
1-2 TABLET ORAL EVERY 4 HOURS PRN
Refills: 0 | Status: ON HOLD
Start: 2019-01-03 | End: 2019-01-11

## 2019-01-03 RX ORDER — OXYCODONE HYDROCHLORIDE 10 MG/1
10 TABLET ORAL EVERY 4 HOURS PRN
Status: DISCONTINUED | OUTPATIENT
Start: 2019-01-03 | End: 2019-01-07

## 2019-01-03 RX ORDER — DOCUSATE SODIUM 100 MG/1
100 CAPSULE, LIQUID FILLED ORAL 2 TIMES DAILY
Status: DISCONTINUED | OUTPATIENT
Start: 2019-01-03 | End: 2019-01-11

## 2019-01-03 RX ORDER — TIZANIDINE 2 MG/1
2 TABLET ORAL 3 TIMES DAILY PRN
Status: ON HOLD
Start: 2019-01-03 | End: 2019-01-11

## 2019-01-03 RX ORDER — CALCIUM CARBONATE 500 MG/1
500 TABLET, CHEWABLE ORAL 2 TIMES DAILY
Status: DISCONTINUED | OUTPATIENT
Start: 2019-01-03 | End: 2019-01-12 | Stop reason: HOSPADM

## 2019-01-03 RX ORDER — GABAPENTIN 300 MG/1
600 CAPSULE ORAL
Status: CANCELLED | OUTPATIENT
Start: 2019-01-03

## 2019-01-03 RX ORDER — GABAPENTIN 100 MG/1
200 CAPSULE ORAL EVERY MORNING
Status: DISCONTINUED | OUTPATIENT
Start: 2019-01-04 | End: 2019-01-03

## 2019-01-03 RX ORDER — POLYVINYL ALCOHOL 14 MG/ML
1 SOLUTION/ DROPS OPHTHALMIC PRN
Status: DISCONTINUED | OUTPATIENT
Start: 2019-01-03 | End: 2019-01-12 | Stop reason: HOSPADM

## 2019-01-03 RX ORDER — AMOXICILLIN 250 MG
1 CAPSULE ORAL NIGHTLY
Status: CANCELLED | OUTPATIENT
Start: 2019-01-03

## 2019-01-03 RX ORDER — INSULIN GLARGINE 100 [IU]/ML
30 INJECTION, SOLUTION SUBCUTANEOUS NIGHTLY
Status: DISCONTINUED | OUTPATIENT
Start: 2019-01-03 | End: 2019-01-12 | Stop reason: HOSPADM

## 2019-01-03 RX ORDER — BISACODYL 10 MG
10 SUPPOSITORY, RECTAL RECTAL
Status: CANCELLED | OUTPATIENT
Start: 2019-01-03

## 2019-01-03 RX ORDER — ATORVASTATIN CALCIUM 10 MG/1
20 TABLET, FILM COATED ORAL
Status: DISCONTINUED | OUTPATIENT
Start: 2019-01-03 | End: 2019-01-12 | Stop reason: HOSPADM

## 2019-01-03 RX ORDER — CALCIUM CARBONATE 500 MG/1
500 TABLET, CHEWABLE ORAL 2 TIMES DAILY
Status: CANCELLED | OUTPATIENT
Start: 2019-01-03

## 2019-01-03 RX ORDER — GABAPENTIN 300 MG/1
300 CAPSULE ORAL 2 TIMES DAILY
Status: DISCONTINUED | OUTPATIENT
Start: 2019-01-03 | End: 2019-01-12 | Stop reason: HOSPADM

## 2019-01-03 RX ORDER — BISACODYL 10 MG
10 SUPPOSITORY, RECTAL RECTAL
Status: DISCONTINUED | OUTPATIENT
Start: 2019-01-03 | End: 2019-01-12 | Stop reason: HOSPADM

## 2019-01-03 RX ORDER — TRAZODONE HYDROCHLORIDE 50 MG/1
50 TABLET ORAL
Status: DISCONTINUED | OUTPATIENT
Start: 2019-01-03 | End: 2019-01-12 | Stop reason: HOSPADM

## 2019-01-03 RX ORDER — LEVOTHYROXINE SODIUM 0.03 MG/1
175 TABLET ORAL
Status: DISCONTINUED | OUTPATIENT
Start: 2019-01-04 | End: 2019-01-03

## 2019-01-03 RX ORDER — BUDESONIDE AND FORMOTEROL FUMARATE DIHYDRATE 80; 4.5 UG/1; UG/1
2 AEROSOL RESPIRATORY (INHALATION) 2 TIMES DAILY
Status: DISCONTINUED | OUTPATIENT
Start: 2019-01-03 | End: 2019-01-12 | Stop reason: HOSPADM

## 2019-01-03 RX ORDER — LEVOTHYROXINE SODIUM 0.12 MG/1
125 TABLET ORAL
Status: DISCONTINUED | OUTPATIENT
Start: 2019-01-04 | End: 2019-01-12 | Stop reason: HOSPADM

## 2019-01-03 RX ORDER — ATORVASTATIN CALCIUM 40 MG/1
20 TABLET, FILM COATED ORAL
Status: CANCELLED | OUTPATIENT
Start: 2019-01-03

## 2019-01-03 RX ORDER — METOPROLOL SUCCINATE 100 MG/1
200 TABLET, EXTENDED RELEASE ORAL 2 TIMES DAILY
Status: CANCELLED | OUTPATIENT
Start: 2019-01-03

## 2019-01-03 RX ORDER — HYDRALAZINE HYDROCHLORIDE 25 MG/1
25 TABLET, FILM COATED ORAL EVERY 8 HOURS PRN
Status: DISCONTINUED | OUTPATIENT
Start: 2019-01-03 | End: 2019-01-12 | Stop reason: HOSPADM

## 2019-01-03 RX ORDER — AMITRIPTYLINE HYDROCHLORIDE 10 MG/1
20 TABLET, FILM COATED ORAL
Status: DISCONTINUED | OUTPATIENT
Start: 2019-01-03 | End: 2019-01-12 | Stop reason: HOSPADM

## 2019-01-03 RX ORDER — TIZANIDINE 4 MG/1
2 TABLET ORAL 3 TIMES DAILY PRN
Status: DISCONTINUED | OUTPATIENT
Start: 2019-01-03 | End: 2019-01-11

## 2019-01-03 RX ORDER — TIZANIDINE 4 MG/1
2 TABLET ORAL 3 TIMES DAILY PRN
Status: CANCELLED | OUTPATIENT
Start: 2019-01-03

## 2019-01-03 RX ORDER — LEVOTHYROXINE SODIUM 0.05 MG/1
50 TABLET ORAL
Status: DISCONTINUED | OUTPATIENT
Start: 2019-01-04 | End: 2019-01-12 | Stop reason: HOSPADM

## 2019-01-03 RX ORDER — INSULIN GLARGINE 100 [IU]/ML
30 INJECTION, SOLUTION SUBCUTANEOUS NIGHTLY
Status: CANCELLED | OUTPATIENT
Start: 2019-01-03

## 2019-01-03 RX ORDER — AMITRIPTYLINE HYDROCHLORIDE 10 MG/1
20 TABLET, FILM COATED ORAL
Status: CANCELLED | OUTPATIENT
Start: 2019-01-03

## 2019-01-03 RX ORDER — DOCUSATE SODIUM 100 MG/1
100 CAPSULE, LIQUID FILLED ORAL 2 TIMES DAILY
Status: CANCELLED | OUTPATIENT
Start: 2019-01-03

## 2019-01-03 RX ORDER — ONDANSETRON 2 MG/ML
4 INJECTION INTRAMUSCULAR; INTRAVENOUS 4 TIMES DAILY PRN
Status: DISCONTINUED | OUTPATIENT
Start: 2019-01-03 | End: 2019-01-12 | Stop reason: HOSPADM

## 2019-01-03 RX ORDER — PSEUDOEPHEDRINE HCL 30 MG
100 TABLET ORAL 2 TIMES DAILY
Status: ON HOLD
Start: 2019-01-03 | End: 2019-01-11

## 2019-01-03 RX ORDER — ONDANSETRON 4 MG/1
4 TABLET, ORALLY DISINTEGRATING ORAL 4 TIMES DAILY PRN
Status: DISCONTINUED | OUTPATIENT
Start: 2019-01-03 | End: 2019-01-12 | Stop reason: HOSPADM

## 2019-01-03 RX ORDER — AMLODIPINE BESYLATE 5 MG/1
2.5 TABLET ORAL
Status: DISCONTINUED | OUTPATIENT
Start: 2019-01-03 | End: 2019-01-09

## 2019-01-03 RX ORDER — OXYCODONE HYDROCHLORIDE 5 MG/1
5 TABLET ORAL EVERY 4 HOURS PRN
Status: DISCONTINUED | OUTPATIENT
Start: 2019-01-03 | End: 2019-01-07

## 2019-01-03 RX ORDER — METOPROLOL SUCCINATE 100 MG/1
200 TABLET, EXTENDED RELEASE ORAL 2 TIMES DAILY
Status: DISCONTINUED | OUTPATIENT
Start: 2019-01-03 | End: 2019-01-12 | Stop reason: HOSPADM

## 2019-01-03 RX ORDER — AMOXICILLIN 250 MG
1 CAPSULE ORAL NIGHTLY
Status: DISCONTINUED | OUTPATIENT
Start: 2019-01-03 | End: 2019-01-11

## 2019-01-03 RX ORDER — ECHINACEA PURPUREA EXTRACT 125 MG
2 TABLET ORAL PRN
Status: DISCONTINUED | OUTPATIENT
Start: 2019-01-03 | End: 2019-01-12 | Stop reason: HOSPADM

## 2019-01-03 RX ORDER — ACETAMINOPHEN 325 MG/1
650 TABLET ORAL EVERY 6 HOURS
Status: DISCONTINUED | OUTPATIENT
Start: 2019-01-03 | End: 2019-01-04

## 2019-01-03 RX ORDER — AMLODIPINE BESYLATE 2.5 MG/1
2.5 TABLET ORAL
Status: CANCELLED | OUTPATIENT
Start: 2019-01-03

## 2019-01-03 RX ORDER — GABAPENTIN 300 MG/1
600 CAPSULE ORAL
Status: DISCONTINUED | OUTPATIENT
Start: 2019-01-03 | End: 2019-01-12 | Stop reason: HOSPADM

## 2019-01-03 RX ADMIN — MAGNESIUM HYDROXIDE 30 ML: 400 SUSPENSION ORAL at 08:09

## 2019-01-03 RX ADMIN — METFORMIN HYDROCHLORIDE 500 MG: 500 TABLET, FILM COATED ORAL at 17:46

## 2019-01-03 RX ADMIN — GABAPENTIN 200 MG: 100 CAPSULE ORAL at 05:48

## 2019-01-03 RX ADMIN — METFORMIN HYDROCHLORIDE 500 MG: 500 TABLET ORAL at 07:57

## 2019-01-03 RX ADMIN — OXYCODONE AND ACETAMINOPHEN 1 TABLET: 5; 325 TABLET ORAL at 10:04

## 2019-01-03 RX ADMIN — ANTACID TABLETS 500 MG: 500 TABLET, CHEWABLE ORAL at 05:48

## 2019-01-03 RX ADMIN — INSULIN GLARGINE 30 UNITS: 100 INJECTION, SOLUTION SUBCUTANEOUS at 21:28

## 2019-01-03 RX ADMIN — ATORVASTATIN CALCIUM 20 MG: 10 TABLET, FILM COATED ORAL at 21:27

## 2019-01-03 RX ADMIN — CALCIUM CARBONATE (ANTACID) CHEW TAB 500 MG 500 MG: 500 CHEW TAB at 21:28

## 2019-01-03 RX ADMIN — LEVOTHYROXINE SODIUM 175 MCG: 150 TABLET ORAL at 05:48

## 2019-01-03 RX ADMIN — DOCUSATE SODIUM 100 MG: 100 CAPSULE, LIQUID FILLED ORAL at 05:48

## 2019-01-03 RX ADMIN — GABAPENTIN 600 MG: 300 CAPSULE ORAL at 21:28

## 2019-01-03 RX ADMIN — BUDESONIDE AND FORMOTEROL FUMARATE DIHYDRATE 2 PUFF: 80; 4.5 AEROSOL RESPIRATORY (INHALATION) at 21:28

## 2019-01-03 RX ADMIN — ENOXAPARIN SODIUM 40 MG: 100 INJECTION SUBCUTANEOUS at 21:28

## 2019-01-03 RX ADMIN — AMLODIPINE BESYLATE 2.5 MG: 5 TABLET ORAL at 21:28

## 2019-01-03 RX ADMIN — METOPROLOL SUCCINATE 200 MG: 100 TABLET, EXTENDED RELEASE ORAL at 21:28

## 2019-01-03 RX ADMIN — SITAGLIPTIN 100 MG: 100 TABLET, FILM COATED ORAL at 05:48

## 2019-01-03 RX ADMIN — AMITRIPTYLINE HYDROCHLORIDE 20 MG: 10 TABLET, FILM COATED ORAL at 21:27

## 2019-01-03 RX ADMIN — METOPROLOL SUCCINATE 200 MG: 100 TABLET, FILM COATED, EXTENDED RELEASE ORAL at 05:48

## 2019-01-03 RX ADMIN — ACETAMINOPHEN 650 MG: 325 TABLET, FILM COATED ORAL at 17:46

## 2019-01-03 RX ADMIN — GABAPENTIN 300 MG: 300 CAPSULE ORAL at 15:36

## 2019-01-03 RX ADMIN — BUDESONIDE AND FORMOTEROL FUMARATE DIHYDRATE 2 PUFF: 80; 4.5 AEROSOL RESPIRATORY (INHALATION) at 05:50

## 2019-01-03 ASSESSMENT — PAIN SCALES - GENERAL
PAINLEVEL_OUTOF10: 2
PAINLEVEL_OUTOF10: 0
PAINLEVEL_OUTOF10: 5
PAINLEVEL_OUTOF10: 3
PAINLEVEL_OUTOF10: 5
PAINLEVEL_OUTOF10: 3

## 2019-01-03 ASSESSMENT — LIFESTYLE VARIABLES: ALCOHOL_USE: NO

## 2019-01-03 ASSESSMENT — PATIENT HEALTH QUESTIONNAIRE - PHQ9
SUM OF ALL RESPONSES TO PHQ9 QUESTIONS 1 AND 2: 0
2. FEELING DOWN, DEPRESSED, IRRITABLE, OR HOPELESS: NOT AT ALL
1. LITTLE INTEREST OR PLEASURE IN DOING THINGS: NOT AT ALL

## 2019-01-03 NOTE — PROGRESS NOTES
Received report from night shift RN. Assumed care of patient. Pt assessed and stable. VSS.  Discussed plan of care for day with patient and received verbal understanding. Call light within reach, strip alarm active, bed in low position.

## 2019-01-03 NOTE — CARE PLAN
Problem: Communication  Goal: The ability to communicate needs accurately and effectively will improve  Patient is alert and oriented x 4.  She  Is able to communicate her needs accurately and effectively.      Problem: Bowel/Gastric:  Goal: Normal bowel function is maintained or improved  Bowel sounds present x 4 quads.

## 2019-01-03 NOTE — DISCHARGE PLANNING
CHRISSY completed Dr. Bajwa to review for consideration for [facility] level of care. Medicare provider. Will follow for Physiatry acceptance and medical clearance.

## 2019-01-03 NOTE — PROGRESS NOTES
Admit to [facility] from [facility].  Dr. Bajwa to follow for diagnosis of DM and L3-L5 decompression.  Initial assessment initiated. [facility] to [facility] process, safety in room, bathroom, and call light.    Client/family goal strengthening.    Dr. Bajwa notified at 1100    The Rehabilitation Interdisciplinary Plan Of Care(s) intitiated are as follows:  Dressing/Grooming Self-Care Deficit, Impaired Physical Mobility and Pain.      Patient oriented to facility, education binder given and explained.

## 2019-01-03 NOTE — H&P
REHABILITATION HISTORY AND PHYSICAL/POST ADMISSION EVALUATION    1/3/2019  12:08 PM  Germaine Chaparro  [id number]  Admission  1/3/2019 10:49 AM  Reason for admission: Spinal stenosis of lumbar region with neurogenic claudication    HPI:  Germaine Chaparro is a 73-year-old right hand dominant female with a history of diabetes, obesity with BMI of 42.9, hypertension, low back pain and multiple lower back surgeries who was admitted on December 28, 2018 with neurogenic claudication with recurrent lumbar stenosis at L3-L5.    She has a history of cervical decompression and fusion in 2015, lumbar discectomy in 2016, and lumbar laminectomy in 2016.     She is now status post L3 nerve root decompression, L4 redo laminectomy, L5 laminectomy, arthrodesis from L3-L5 and instrumented fusion from L3-L5 by Dr. Sheldon on December 28, 2018.    She was seen by Dr. Geronimo on January 2, 2019 and reported significant improvement in preoperative pain.  She has previously participated in acute rehabilitation following lumbar surgeries and has participated and done well.  Dr. Geronimo noted significant right lower limb weakness that was worse proximally.    She last worked with physical therapy on January 2 and was min assist with a front wheeled walker.  She was mod assist for bed mobility.  Last seen by occupational therapy on January 2 and required max assist for lower body dressing.    She was admitted to [facility] on January 3, 2019    She reports severe pain in the left L3-4 distribution, but this has improved since surgery, and prior to surgery, it extended all the way to the L5 dermatomal distribution.  She reports chronic numbness in her hands and feet.  The numbness in her lower limbs is in a stocking type distribution.  She denies any fevers, chills, headache, dizziness, chest pain, or shortness of breath.  She reports some dyspnea on exertion.  She reports that at home she is not using oxygen, but she has  "required oxygen since surgery.  She has long-standing urinary incontinence.  She states that she has little warning when she needs to void.  She reports constipation since surgery, but she has had several bowel movements today with the administration of milk of magnesia.  Prior to surgery, she had no bowel compromise.  She denies any nausea or vomiting, but with constipation, her appetite has been low.  She denies any changes in vision, hearing or swallowing.    REVIEW OF SYSTEMS:     All other systems were reviewed and are negative    PMH:  Past Medical History:   Diagnosis Date   • Anesthesia 2016    \"took a long time to wake up, ended up on a vent after neck surg\"     • Arthritis     fingers, back   • Asthma     uses inhaler   • Breath shortness     with exertion   • Bronchitis 1998   • Congestive heart failure (HCC)    • Diabetes (HCC)     oral and insulin   • Encounter for renal dialysis 07/2014    r/t ARF, denies at this time   • Heart murmur    • Heart valve disease    • High cholesterol    • Hypertension    • Hypothyroid    • Low back pain     back and leg 8/10 at worst    • Numbness and tingling in hands     and arms   • Pain 3/4/16    3/10 lower back   • Renal disorder     AFR in 7-2014, resolved   • Snoring    • Unspecified cataract     removed bilat   • Unspecified urinary incontinence     wears pad   • Urinary bladder disorder        PSH:  Past Surgical History:   Procedure Laterality Date   • LUMBAR FUSION POSTERIOR  12/28/2018    Procedure: LUMBAR FUSION POSTERIOR- L3-5 REDO;  Surgeon: Avel Sheldon M.D.;  Location: [facility];  Service: Neurosurgery   • LUMBAR DECOMPRESSION  12/28/2018    Procedure: LUMBAR DECOMPRESSION;  Surgeon: Avel Sheldon M.D.;  Location: [facility];  Service: Neurosurgery   • CERVICAL FUSION POSTERIOR  10/13/2016    Procedure: CERVICAL FUSION POSTERIOR - EXTENSION TO C3-4 W/PLACEMENT OF LATERAL MASS SCREWS AT C3;  Surgeon: Alvaro Chaudhry M.D.;  Location: " SURGERY [facility];  Service:    • CERVICAL DECOMPRESSION POSTERIOR  10/13/2016    Procedure: CERVICAL DECOMPRESSION POSTERIOR - RE=DO C3-4;  Surgeon: Alvaro Chaudhry M.D.;  Location: SURGERY [facility];  Service:    • LUMBAR LAMINECTOMY DISKECTOMY  10/13/2016    Procedure: LUMBAR LAMINECTOMY DISKECTOMY - L4-5;  Surgeon: Alvaro Chaudhry M.D.;  Location: SURGERY [facility];  Service:    • IRRIGATION & DEBRIDEMENT NEURO  3/10/2016    Procedure: IRRIGATION & DEBRIDEMENT NEURO AND WOUND VAC PLACEMENT;  Surgeon: Alvaro Chaudhry M.D.;  Location: SURGERY [facility];  Service:    • LUMBAR LAMINECTOMY DISKECTOMY  1/22/2016    Procedure: LUMBAR LAMINECTOMY DISKECTOMY L1-5;  Surgeon: Alvaro Chaudhry M.D.;  Location: SURGERY [facility];  Service:    • CERVICAL FUSION POSTERIOR  9/16/2015    Procedure: CERVICAL FUSION POSTERIOR C3-T1 LATERAL MASS SCREWS;  Surgeon: Alvaro Chaudhry M.D.;  Location: SURGERY [facility];  Service:    • CERVICAL LAMINECTOMY POSTERIOR  9/16/2015    Procedure: CERVICAL LAMINECTOMY POSTERIOR C3-T1;  Surgeon: Alvaro Chaudhry M.D.;  Location: SURGERY [facility];  Service:    • CATARACT EXTRACTION WITH IOL Bilateral    • GYN SURGERY      hysterectomy    • OTHER ABDOMINAL SURGERY      appendix removed   • TONSILLECTOMY         FAMILY HISTORY:  No neurodegenerative conditions noted    MEDICATIONS:  Current Facility-Administered Medications   Medication Dose   • Respiratory Care per Protocol     • Pharmacy Consult Request ...Pain Management Review 1 Each  1 Each   • hydrALAZINE (APRESOLINE) tablet 25 mg  25 mg   • artificial tears 1.4 % ophthalmic solution 1 Drop  1 Drop   • benzocaine-menthol (CEPACOL) lozenge 1 Lozenge  1 Lozenge   • mag hydrox-al hydrox-simeth (MAALOX PLUS ES or MYLANTA DS) suspension 20 mL  20 mL   • ondansetron (ZOFRAN ODT) dispertab 4 mg  4 mg    Or   • ondansetron (ZOFRAN) syringe/vial injection 4 mg  4 mg   • traZODone (DESYREL) tablet 50 mg  50 mg    • sodium chloride (OCEAN) 0.65 % nasal spray 2 Spray  2 Spray   • acetaminophen (TYLENOL) tablet 650 mg  650 mg   • oxyCODONE immediate-release (ROXICODONE) tablet 5 mg  5 mg   • oxyCODONE immediate release (ROXICODONE) tablet 10 mg  10 mg   • enoxaparin (LOVENOX) inj 40 mg  40 mg   • bisacodyl (DULCOLAX) suppository 10 mg  10 mg   • calcium carbonate (TUMS) chewable tab 500 mg  500 mg   • docusate sodium (COLACE) capsule 100 mg  100 mg   • magnesium hydroxide (MILK OF MAGNESIA) suspension 30 mL  30 mL   • senna-docusate (PERICOLACE or SENOKOT S) 8.6-50 MG per tablet 1 Tab  1 Tab   • tizanidine (ZANAFLEX) tablet 2 mg  2 mg   • amitriptyline (ELAVIL) tablet 20 mg  20 mg   • amLODIPine (NORVASC) tablet 2.5 mg  2.5 mg   • atorvastatin (LIPITOR) tablet 20 mg  20 mg   • budesonide-formoterol (SYMBICORT) 80-4.5 MCG/ACT inhaler 2 Puff  2 Puff   • gabapentin (NEURONTIN) capsule 600 mg  600 mg   • insulin glargine (LANTUS) injection 30 Units  30 Units   • metFORMIN (GLUCOPHAGE) tablet 500 mg  500 mg   • metoprolol SR (TOPROL XL) tablet 200 mg  200 mg   • [START ON 1/4/2019] SITagliptin (JANUVIA) tablet 100 mg  100 mg   • [START ON 1/4/2019] levothyroxine (SYNTHROID) tablet 125 mcg  125 mcg    And   • [START ON 1/4/2019] levothyroxine (SYNTHROID) tablet 50 mcg  50 mcg   • gabapentin (NEURONTIN) capsule 300 mg  300 mg       ALLERGIES:  Codeine; Other environmental; and Tape    PSYCHOSOCIAL HISTORY:  She is a  and lives independently in a multilevel home.  The upstairs consists of an extra bedroom, and she does not need to access the upper level.  On the main level, there is a bathroom and bedroom available.  There are 2 stairs to enter the home.  At home, she uses a front wheeled walker in the community and a 4 wheeled walker in the house.  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  She has an active social schedule during the week including  Sabianism groups, Bible study, grief support group, volunteer tasks, and card game groups.      LEVEL OF FUNCTION PRIOR TO DISABILTY:  Previously fully independent using a front wheeled walker in the community and for wheeled walker in the house    LEVEL OF FUNCTION PRIOR TO ADMISSION to [facility]:  She last worked with physical therapy on January 2 and was min assist with a front wheeled walker.  She was mod assist for bed mobility.  Last seen by occupational therapy on January 2 and required max assist for lower body dressing.    CURRENT LEVEL OF FUNCTION:   Same as level of function prior to admission to [facility]    PHYSICAL EXAM:     VITAL SIGNS:   weight is 115 kg (253 lb 9.6 oz). Her tympanic temperature is 36.5 °C (97.7 °F). Her blood pressure is 128/56 and her pulse is 60. Her respiration is 17 and oxygen saturation is 95%.     GENERAL: No apparent distress  HEENT: Normocephalic/atraumatic, EOMI, PERRL and No nystagmus.  Wearing nasal cannula oxygen at 2 L/min  CARDIAC: Regular rate and rhythm  LUNGS: Clear to auscultation   ABDOMINAL: bowel sounds present but hypoactive, soft, nontender and nondistended    EXTREMITIES: Mild edema in the bilateral ankles and feet    NEURO:    Mental status:  A&Ox4 (person, place, date, situation) answers questions appropriately follows commands.      Speech/language: fluent, no aphasia or dysarthria    CRANIAL NERVES:  2,3: visual acuity grossly intact, PERRL  3,4,6: EOMI bilaterally, no nystagmus or diplopia  5: intact in all branches  7: no facial asymmetry  8: hearing grossly intact  9,10: symmetric palate elevation  11: SCM/Trapezius strength 5/5 bilaterally  12: tongue protrudes midline    Motor:  Elbow flexors:  Bilateral -  5/5  Wrist extensors:  Bilateral -  5/5  Elbow extensors:  Bilateral -  5/5  Finger flexors:  Right -  5/5, Left -  4/5  Finger abductors:  Bilateral -  4/5  Hip flexors:  Right -  3/5, Left -  2/5  Knee ext:   Right -  4/5, Left -  2+/5  Dorsiflexors:  Right -  4/5, Left -  4-/5  EHL:  Right -  4/5, Left -  4-/5  Plantar flexors:  Bilateral -  4/5    Sensory: Diminished in the bilateral, left greater than right, C7, C8, and T1 dermatomes.  Diminished in the left L2-L4 dermatome.  More significantly diminished in the L5 to S1.  Mildly diminished in the right L4-S1.  Reported as intact and S2    Reflexes: Biceps, triceps, brachioradialis are 3+.  Anne's present bilaterally, right greater than left.  Crossed adductors present bilaterally.  Patellar reflexes 3+ bilaterally.  Achilles 2+.  No response with Babinski bilaterally.    Tone: no spasticity noted.  No clonus with forced dorsiflexion.    Skin: Lumbar incision with a very mild amount of dried blood present.  Approximated with staples.  Clean, dry, and intact.  No sign of fluid collection or dehiscence.        RADIOLOGY:  I independently reviewed the MRI of the lumbar spine from October 3, 2018 showing moderate central canal stenosis at the L3-L4 level.  There is multilevel degenerative change and extensive postoperative changes.    LABS:                PRIMARY REHAB DIAGNOSIS:    This patient is a 73 y.o. female admitted for acute inpatient rehabilitation with 04.130 Other Non-Traumatic Spinal Cord Dysfunction due to Spinal stenosis of lumbar region with neurogenic claudication.    IMPAIRMENTS:   ADLs/IADLs  Mobility    RELEVANT CHANGES SINCE PREADMISSION EVALUATION:    Status unchanged    The patient's rehabilitation potential is Very Good  The patient's medical prognosis is good    PLAN:   Discussion and Recommendations:   1. The patient requires an acute inpatient rehabilitation program with a coordinated program of care at an intensity and frequency not available at a lower level of care. This recommendation is substantiated by the patient's medical physicians who recommend that the patient's intervention and assessment of medical issues needs to be done at an  acute level of care for patient's safety and maximum outcome.   2. A coordinated program of care will be supplied by an interdisciplinary team of physical therapy, occupational therapy, rehab physician, rehab nursing, and, if needed, speech therapy and rehab psychology. Rehab team presents a patient-specific rehabilitation and education program concentrating on prevention of future problems related to accessibility, mobility, skin, bowel, bladder, sexuality, and psychosocial and medical/surgical problems.   3. Need for Rehabilitation Physician: The rehab physician will be evaluating the patient on a multi-weekly basis to help coordinate the program of care. The rehab physician communicates between medical physicians, therapists, and nurses to maximize the patient's potential outcome. Specific areas in which the rehab physician will be providing daily assessment include the following:   A. Assessing the patient's heart rate and blood pressure response (vitals monitoring) to activity and making adjustments in medications or conservative measures as needed.   B. The rehab physician will be assessing the frequency at which the program can be increased to allow the patient to reach optimal functional outcome.   C. The rehab physician will also provide assessments in daily skin care, especially in light of patient's impairments in mobility.   D. The rehab physician will provide special expertise in understanding how to work with functional impairment and recommend appropriate interventions, compensatory techniques, and education that will facilitate the patient's outcome.   4. Rehab R.N.   The rehab RN will be working with patient to carry over in room mobility and activities of daily living when the patient is not in 3 hours of skilled therapy. Rehab nursing will be working in conjunction with rehab physician to address all the medical issues above and continue to assess laboratory work and discuss abnormalities with the  treating physicians, assess vitals, and response to activity, and discuss and report abnormalities with the rehab physician. Rehab RN will also continue daily skin care, supervise bladder/bowel program, instruct in medication administration, and ensure patient safety.     E. Therapies to treat at intensity and frequency of (may change after completion of evaluation by all therapeutic disciplines):       PT:  Physical therapy to address mobility, transfer, gait training and evaluation for adaptive equipment needs 1.5 hour/day at least 5 days/week for the duration of the ELOS (see below)       OT:  Occupational therapy to address ADLs, self-care, home management training, functional mobility/transfers and assistive device evaluation, and community re-integration 1.5 hour/day at least 5 days/week for the duration of the ELOS (see below).         F. Medical management / Rehabilitation Issues/ Adverse Potential as part of rehabilitation plan         Ms. Chaparro is a 73-year-old female admitted for rehabilitation on January 3 due to lumbar stenosis    Lumbar stenosis status post L3-L5 posterior instrumented fusion by Dr. 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  History of multiple lower back surgeries  Initiate comprehensive rehabilitation  TLSO on when out of bed  Staples can be removed postoperative day 14 (around January 10)  Calcium supplementation per neurosurgery  Check vitamin D level on admission    Pain  Scheduled Tylenol until pain is better controlled  Oxycodone 5-10 mg every 4 hours as needed    At home she is on oxycodone/APAP 5/325 mg 1-2 tabs q 4 hours.  Currently, we are dividing oxycodone and acetaminophen to prove pain control, but goal of transitioning back to Percocet by the time of discharge.    Elavil 20 mg at bedtime    Gabapentin increased on admission due to ongoing neuropathic pain  Gabapentin 300 mg in the morning and midday and 600 mg  at bedtime    Zanaflex 2 mg every 8 hours as needed  Check liver function on admission    Type 2 diabetes with hyperglycemia  Lantus 30 units nightly  Januvia 100 mg  Metformin 500 mg twice a day  Consulting hospitalist    Hypertension  Congestive heart failure   Amlodipine 2.5 mg at bedtime  Toprol- mg twice daily    Hyperlipidemia  Lipitor 20 mg at bedtime    Hypothyroidism  Synthroid 175 mcg of daily    Mild anemia  Hemoglobin 11.2 on December 30  Continue to monitor    Leukocytosis  White blood cell count of 10.9 postoperatively  This has trended down from 11.8  Continue to monitor.  T-max in 24 hours at the [facility] of 37.1 degrees    Asthma  Symbicort twice daily    Bowel and bladder  History of urinary incontinence  Check postvoid residuals to ensure bladder emptying  Consider Ditropan for incontinence if indicated    Colace 100 mg twice a day  Katarzyna-Colace nightly  Milk of magnesia as needed  Bisacodyl suppository as needed      DVT prophylaxis  Lovenox daily    Estimated discharge: 10-14 days    Verified CODE STATUS as full with patient on admission     I completed medication reconciliation on admission and did not identify any clinically significant medication issues    Admission care coordination time today was 70 min, and entire time spent in face-to-face contact was >50% in counseling and coordination of care as detailed in A/P above.        GOALS/EXPECTED LEVEL OF FUNCTION BASED ON CURRENT MEDICAL AND FUNCTIONAL STATUS (may change based on patient's medical status and rate of impairment recovery):  Transfers:   Modified Independent  Mobility/Gait:   Modified Independent  ADL's:   Modified Independent    DISPOSITION: Discharge to pre-morbid independent living setting with the supportive care of patient's community resources.    Jace Bajwa MD  1/3/2019  12:08 PM

## 2019-01-03 NOTE — CARE PLAN
Problem: Communication  Goal: The ability to communicate needs accurately and effectively will improve  Outcome: PROGRESSING AS EXPECTED  POC discussed, all questions answered. Pt is able to make needs known to staff.     Problem: Urinary Elimination:  Goal: Ability to reestablish a normal urinary elimination pattern will improve  Outcome: PROGRESSING AS EXPECTED  Pt voiding well.

## 2019-01-03 NOTE — PROGRESS NOTES
Patient IV removed. Patient is up to date on pneumonia and flu vaccines. Discharge paperwork discussed with the patient at bedside. All questions answered. Patient to be discharged to [facility]. No scripts needed. Cobra and dc instructions sent with the patient. Patient dc'd with LSO brace and belongings. The patient is agreeable to the dc plan. Report called to Carole COLLINS at [phone].

## 2019-01-03 NOTE — DISCHARGE SUMMARY
Discharge Summary    CHIEF COMPLAINT ON ADMISSION  No chief complaint on file.      Reason for Admission  SPINAL STENOSIS OF LUMBAR REGION     CODE STATUS  Full Code    HPI & HOSPITAL COURSE  This is a 73 y.o. female here with lumbar stenosis and admitted after her L3-5 decompression and fusion done 12/28/18.  She has progressed slowly throughout her hospital stay.  She was also difficult to wean from oxygen, but she has been able to work on this with increasing her mobility and using an incentive spirometer.  She is not independent and will need additional therapy to increase her strength and endurance before going home.  Throughout her hospitalization, she has been free from signs of infection.  Vital signs and labs have been stable and her strength to her lower extremities has been intact.  Her incision is clean and dry and open to air.       Therefore, she is discharged in good and stable condition to an inpatient rehabilitation hospital.    The patient met 2-midnight criteria for an inpatient stay at the time of discharge.      FOLLOW UP ITEMS POST DISCHARGE  [place] to be remove 1/11/18    DISCHARGE DIAGNOSES  Active Problems:    * No active hospital problems. *  Resolved Problems:    * No resolved hospital problems. *      FOLLOW UP  2 weeks at [facility] if home before staples removed  6 weeks ar [facility] with lumbar x-rays (AP/lateral)    MEDICATIONS ON DISCHARGE     Medication List      START taking these medications      Instructions   docusate sodium 100 MG Caps   Take 100 mg by mouth 2 Times a Day.  Dose:  100 mg     tizanidine 2 MG tablet  Commonly known as:  ZANAFLEX   Take 1 Tab by mouth 3 times a day as needed.  Dose:  2 mg        CONTINUE taking these medications      Instructions   amitriptyline 10 MG Tabs  Commonly known as:  ELAVIL   Take 2 Tabs by mouth every bedtime.  Dose:  20 mg     amLODIPine 5 MG Tabs  Commonly known as:  NORVASC   Take 2.5 mg by mouth every  "bedtime.  Dose:  2.5 mg     atorvastatin 20 MG Tabs  Commonly known as:  LIPITOR   Take 1 Tab by mouth every bedtime.  Dose:  20 mg     budesonide-formoterol 80-4.5 MCG/ACT Aero  Commonly known as:  SYMBICORT   Inhale 2 Puffs by mouth 2 Times a Day.  Dose:  2 Puff     FARXIGA 5 MG Tabs  Generic drug:  Dapagliflozin Propanediol   Take 1 Tab by mouth every bedtime.  Dose:  1 Tab     * gabapentin 100 MG Caps  Commonly known as:  NEURONTIN   Take 200 mg by mouth every morning.  Dose:  200 mg     * gabapentin 300 MG Caps  Commonly known as:  NEURONTIN   Take 600 mg by mouth every bedtime.  Dose:  600 mg     LANTUS 100 UNIT/ML Soln  Generic drug:  insulin glargine   Inject 30 Units as instructed every evening. 15 units night prior to surgery  Dose:  30 Units     levothyroxine 175 MCG Tabs  Commonly known as:  SYNTHROID   Take 175 mcg by mouth Every morning on an empty stomach.  Dose:  175 mcg     metFORMIN 500 MG Tabs  Commonly known as:  GLUCOPHAGE   Take 500 mg by mouth 2 times a day, with meals.  Dose:  500 mg     oxyCODONE-acetaminophen 5-325 MG Tabs  Commonly known as:  PERCOCET   Take 1-2 Tabs by mouth every four hours as needed for Moderate Pain ((Pain Scale 4-6)) for up to 30 days.  Dose:  1-2 Tab     SITagliptin 100 MG Tabs  Commonly known as:  JANUVIA   Take 100 mg by mouth every morning.  Dose:  100 mg     TOPROL  MG Tb24  Generic drug:  metoprolol SR   Take 200 mg by mouth 2 Times a Day.  Dose:  200 mg        * This list has 2 medication(s) that are the same as other medications prescribed for you. Read the directions carefully, and ask your doctor or other care provider to review them with you.            STOP taking these medications    aspirin EC 81 MG Tbec  Commonly known as:  ECOTRIN     fish oil 1000 MG Caps capsule     GLUCOSAMINE-CHONDROITIN PO     Vitamin C 1000 MG Tabs     VITAMIN E PO            Allergies  Allergies   Allergen Reactions   • Codeine Itching     \"My head itches.\"   • Other " Environmental Rash     Requires Hypoallergenic Linen   • Tape Rash     Paper Tape ok       DIET  Orders Placed This Encounter   Procedures   • Diet Order Diabetic     Standing Status:   Standing     Number of Occurrences:   1     Order Specific Question:   Diet:     Answer:   Diabetic [3]     Order Specific Question:   Calorie modifications:     Answer:   2000 kcals [5]       ACTIVITY  As tolerated and directed by rehab.  15-lb lifting restriction   Wear brace when out of bed greater than 10 minutes  No bending or twisting    LINES, DRAINS, AND WOUNDS  This is an automated list. Peripheral IVs will be removed prior to discharge.  Peripheral IV 12/28/18 18 G Right Hand (Active)   Site Assessment Clean;Dry;Intact;Leaking 1/2/2019 11:00 PM   Dressing Type Transparent;Occlusive 1/2/2019 11:00 PM   Line Status Flushed;Saline locked 1/2/2019 11:00 PM   Dressing Status Dry;Clean;Intact 1/2/2019 11:00 PM   Dressing Intervention N/A 1/2/2019 11:00 PM   Date Primary Tubing Changed 12/28/18 12/29/2018  8:15 AM   NEXT Primary Tubing Change  12/31/18 12/29/2018  8:15 AM   Infiltration Grading ([facility], [facility] [place] 0 1/2/2019 11:00 PM   Phlebitis Scale ([facility] Only) 0 1/2/2019 11:00 PM     Subdural Drain Group (Active)      Surgical Incision  Incision Neck (Active)       Surgical Incision  Incision N/A Back (Active)       Surgical Incision  Incision Back (Active)       Surgical Incision  Incision Cervical Posterior (Active)       Surgical Incision  Incision Posterior Back (Active)       Surgical Incision  Incision Cervical Posterior (Active)       Wound POA Rash Groin Right (Active)       Wound POA Rash Groin Left (Active)       Wound POA Abrasion Mouth Right Upper (Active)       Peripheral IV 12/28/18 18 G Right Hand (Active)   Site Assessment Clean;Dry;Intact;Leaking 1/2/2019 11:00 PM   Dressing Type Transparent;Occlusive 1/2/2019 11:00 PM   Line Status Flushed;Saline locked 1/2/2019 11:00 PM   Dressing Status Dry;Clean;Intact  1/2/2019 11:00 PM   Dressing Intervention N/A 1/2/2019 11:00 PM   Date Primary Tubing Changed 12/28/18 12/29/2018  8:15 AM   NEXT Primary Tubing Change  12/31/18 12/29/2018  8:15 AM   [place] Gr[place] [facility] [place] 1/2/2019 11:00 PM   Phlebitis Scale ([facility] Only) 0 1/2/2019 11:00 PM               MENTAL STATUS ON TRANSFER  Level of Consciousness: Alert  Orientation : Oriented x 4  Speech: Speech Clear    CONSULTATIONS  Rehab    PROCEDURES  L3-5 decompressive laminectomy with TLIF and instrumented stabilization    LABORATORY  Lab Results   Component Value Date    SODIUM 138 12/30/2018    POTASSIUM 4.3 12/30/2018    CHLORIDE 106 12/30/2018    CO2 27 12/30/2018    GLUCOSE 151 (H) 12/30/2018    BUN 13 12/30/2018    CREATININE 0.96 12/30/2018        Lab Results   Component Value Date    WBC 10.9 (H) 12/30/2018    HEMOGLOBIN 11.2 (L) 12/30/2018    HEMATOCRIT 35.0 (L) 12/30/2018    PLATELETCT 207 12/30/2018

## 2019-01-03 NOTE — FLOWSHEET NOTE
01/03/19 1250   Type of Assessment   Assessment Yes   Patient History   Pulmonary Diagnosis asthma   Surgical Procedures lumbar   Home O2 No   Home Treatments/Frequency Yes   MDI 1/Frequency Symbicort BID   COPD Risk Screening   Do you have a history of COPD? No   Level Of Consciousness   Level of Consciousness Alert   Respiratory WDL   Respiratory (WDL) X   Chest Exam   Respiration 18   Pulse 68   Breath Sounds   RLL Breath Sounds Diminished   LLL Breath Sounds Diminished   Secretions   Cough Productive  (per pt)   Sputum Color Yellow   Sputum Consistency Thick   Oximetry   #Pulse Oximetry (Single Determination) Yes   Oxygen   Home O2 Use Prior To Admission? No   Pulse Oximetry 93 %   O2 Daily Delivery Respiratory  Room Air with O2 Available   Protocol Pathways   Protocol Pathways Yes   Incentive Spirometer Instruct   Predicted (ml) 2100   60% (ml) 1260   40% (ml) 860   Actual (ml) 1500

## 2019-01-03 NOTE — PROGRESS NOTES
Neurosurgery Progress Note    Subjective:  No acute events. Pt states she continues to improve, currently off O2.     Exam:  A&O, fluent speech  Strength: df/pf 5/5  Dressing c&d      BP  Min: 127/47  Max: 158/60  Pulse  Av  Min: 61  Max: 72  Resp  Av  Min: 17  Max: 17  Temp  Av.7 °C (98.1 °F)  Min: 36.4 °C (97.6 °F)  Max: 37.1 °C (98.7 °F)  SpO2  Av %  Min: 92 %  Max: 98 %    No Data Recorded                      Intake/Output       19 07 - 19 0659 19 07 - 19 0659       Total  Total       Intake    P.O.  --  100 100  --  -- --    P.O. -- 100 100 -- -- --    Total Intake -- 100 100 -- -- --       Output    Urine  --  -- --  --  -- --    Number of Times Voided 4 x 3 x 7 x -- -- --    Total Output -- -- -- -- -- --       Net I/O     -- 100 100 -- -- --            Intake/Output Summary (Last 24 hours) at 19 0825  Last data filed at 19 0600   Gross per 24 hour   Intake              100 ml   Output                0 ml   Net              100 ml            • amitriptyline  20 mg QHS   • amLODIPine  2.5 mg QHS   • atorvastatin  20 mg QHS   • gabapentin  200 mg QAM   • gabapentin  600 mg QHS   • insulin glargine  30 Units Nightly   • levothyroxine  175 mcg AM ES   • metFORMIN  500 mg BID WITH MEALS   • metoprolol SR  200 mg BID   • oxyCODONE-acetaminophen  1-2 Tab Q4HRS PRN   • SITagliptin  100 mg QAM   • HYDROmorphone  2 mg Q3HRS PRN   • budesonide-formoterol  2 Puff BID   • Pharmacy Consult Request  1 Each PRN   • MD ALERT...DO NOT ADMINISTER NSAIDS or ASPIRIN unless ORDERED By Neurosurgery  1 Each PRN   • docusate sodium  100 mg BID   • senna-docusate  1 Tab Nightly   • senna-docusate  1 Tab Q24HRS PRN   • polyethylene glycol/lytes  1 Packet BID PRN   • magnesium hydroxide  30 mL QDAY PRN   • bisacodyl  10 mg Q24HRS PRN   • fleet  1 Each Once PRN   • 0.9 % NaCl with KCl 20 mEq 1,000 mL   Continuous   • diphenhydrAMINE  25 mg  Q6HRS PRN    Or   • diphenhydrAMINE  25 mg Q6HRS PRN   • ondansetron  4 mg Q4HRS PRN   • ondansetron  4 mg Q4HRS PRN   • tizanidine  2 mg TID PRN   • hydrALAZINE  10 mg Q HOUR PRN   • benzocaine-menthol  1 Lozenge Q2HRS PRN   • calcium carbonate  500 mg BID       Assessment and Plan:  POD # 6 L 3-5 D/F  Prophylactic anticoagulation: no         Start date/time: tbd    Plan:   Encourage IS  Wean Oxygen  PT- mobilize patient  Brace on when OOB>5min  Ok to rehab  Staples to be removed POD 14

## 2019-01-03 NOTE — DISCHARGE PLANNING
Approved transfer to [facility] at 1030 Charge nurse notified and CM notified and bedside nurse notified.

## 2019-01-03 NOTE — PREADMISSION SCREENING NOTE
"  Pre-Admission Screening Form    Patient Information:   Name: Germaine Chaparro     MRN: [id number]       : 1945      Age: 73 y.o.   Gender: female      Race: White [7]       Marital Status:  [5]  Family Contact: Rene Chaparro Gail Williams, Michele        Relationship: Son [15]  Friend [5]  Daughter-in-law [28]  Home Phone: [phone]           Cell Phone: [phone]  Advanced Directives: Copy in Chart  Code Status:  FULL  Current Attending Provider: Avel Sheldon M.D.  Referring Physician: Campos NEIL     Physiatrist Consult: Dr. Geronimo        Referral Date: 2019  Primary Payor Source:  MEDICARE  Secondary Payor Source:  SINDI    Medical Information:   Date of Admission to Acute Care [place]  Room Number: S144/00  Rehabilitation Diagnosis: 03.8 Neuromuscular Disorders  Immunization History   Administered Date(s) Administered   • Influenza TIV (IM) 2013   • Influenza Vaccine Adult HD 2015, 10/14/2016   • Pneumococcal Conjugate Vaccine (Prevnar/PCV-13) 2015   • Pneumococcal polysaccharide vaccine (PPSV-23) 2009     Allergies   Allergen Reactions   • Codeine Itching     \"My head itches.\"   • Other Environmental Rash     Requires Hypoallergenic Linen   • Tape Rash     Paper Tape ok     Past Medical History:   Diagnosis Date   • Anesthesia     \"took a long time to wake up, ended up on a vent after neck surg\"     • Arthritis     fingers, back   • Asthma     uses inhaler   • Breath shortness     with exertion   • Bronchitis    • Congestive heart failure (HCC)    • Diabetes (HCC)     oral and insulin   • Encounter for renal dialysis 2014    r/t ARF, denies at this time   • Heart murmur    • Heart valve disease    • High cholesterol    • Hypertension    • Hypothyroid    • Low back pain     back and leg 8/10 at worst    • Numbness and tingling in hands     and arms   • Pain 3//16    3/10 " lower back   • Renal disorder     AFR in 7-2014, resolved   • Snoring    • Unspecified cataract     removed bilat   • Unspecified urinary incontinence     wears pad   • Urinary bladder disorder      Past Surgical History:   Procedure Laterality Date   • LUMBAR FUSION POSTERIOR  12/28/2018    Procedure: LUMBAR FUSION POSTERIOR- L3-5 REDO;  Surgeon: Avel Sheldon M.D.;  Location: [facility];  Service: Neurosurgery   • LUMBAR DECOMPRESSION  12/28/2018    Procedure: LUMBAR DECOMPRESSION;  Surgeon: Avel Sheldon M.D.;  Location: [facility];  Service: Neurosurgery   • CERVICAL FUSION POSTERIOR  10/13/2016    Procedure: CERVICAL FUSION POSTERIOR - EXTENSION TO C3-4 W/PLACEMENT OF LATERAL MASS SCREWS AT C3;  Surgeon: Alvaro Chaudhry M.D.;  Location: [facility];  Service:    • CERVICAL DECOMPRESSION POSTERIOR  10/13/2016    Procedure: CERVICAL DECOMPRESSION POSTERIOR - RE=DO C3-4;  Surgeon: Alvaro Chaudhry M.D.;  Location: [facility];  Service:    • LUMBAR LAMINECTOMY DISKECTOMY  10/13/2016    Procedure: LUMBAR LAMINECTOMY DISKECTOMY - L4-5;  Surgeon: Alvaro Chaudhry M.D.;  Location: [facility];  Service:    • IRRIGATION & DEBRIDEMENT NEURO  3/10/2016    Procedure: IRRIGATION & DEBRIDEMENT NEURO AND WOUND VAC PLACEMENT;  Surgeon: Alvaro Chaudhry M.D.;  Location: [facility];  Service:    • LUMBAR LAMINECTOMY DISKECTOMY  1/22/2016    Procedure: LUMBAR LAMINECTOMY DISKECTOMY L1-5;  Surgeon: Alvaro Chaudhry M.D.;  Location: SUR[facility]ERY [facility];  Service:    • CERVICAL FUSION POSTERIOR  9/16/2015    Procedure: CERVICAL FUSION POSTERIOR C3-T1 LATERAL MASS SCREWS;  Surgeon: Alvaro Chaudhry M.D.;  Location: SURGERY [facility];  Service:    • CERVICAL LAMINECTOMY POSTERIOR  9/16/2015    Procedure: CERVICAL LAMINECTOMY POSTERIOR C3-T1;  Surgeon: Alvaro Chaudhry M.D.;  Location: SURGERY [facility];  Service:    • CATARACT EXTRACTION WITH IOL  Bilateral    • GYN SURGERY      hysterectomy    • OTHER ABDOMINAL SURGERY      appendix removed   • TONSILLECTOMY         History Leading to Admission, Conditions that Caused the Need for Rehab (CMS):     Patient presents with recurrent lumbar   stenosis at L4-L5 secondary to spondylolisthesis.  This is after 2 operations   on this area, with progressive instability.  At L3-L4, patient also has a   herniated disk, which in addition to facet arthropathy causes severe spinal   stenosis.     It has to be considered that the patient's BMI is 42.  She has diabetes,   hypertension, and has had some issues with surgery in the past due to her 3   back operations, which includes an I and D with placement of a wound VAC.    Patient has grown exuberant scar tissue, and this made this case extremely   difficult.     Patient is thus given a modifier for the decompressive part of the procedure,   which made this procedure approximately 25-40% more complicated.  The   indications and possible complications were explained, informed consent was   obtained.    DATE OF SERVICE:  12/28/2018     PREOPERATIVE DIAGNOSES:  1.  Status post L1 through L4 decompressive laminectomy in 01/2016.  2.  Status post incision and drainage of wound in 03/2016 with wound VAC   placement.  3.  Status post redo lumbar laminectomy L4-L5 in 10/2016.  4.  Recurrent lumbar stenosis L4-L5 with spondylolisthesis.  5.  L3-L4 recurrent lumbar stenosis with subligamentous herniated disk.  6.  Neurogenic claudication and low back pain.  7.  Morbid obesity with diabetes and hypertension.     POSTOPERATIVE DIAGNOSES:  1.  Status post L1 through L4 decompressive laminectomy in 01/2016.  2.  Status post incision and drainage of wound in 03/2016 with wound VAC   placement.  3.  Status post redo lumbar laminectomy L4-L5 in 10/2016.  4.  Recurrent lumbar stenosis L4-L5 with spondylolisthesis.  5.  L3-L4 recurrent lumbar stenosis with subligamentous herniated disk.  6.   Neurogenic claudication and low back pain.  7.  Morbid obesity with diabetes and hypertension.     PROCEDURES:  1.  Redo medial facetectomy and bilateral foraminotomies and decompression of   L3 nerve roots -- 22 modifier for extremely difficult surgical procedure due   to morbid obesity and multiple previous lumbar laminectomies.  2.  L4 redo laminectomy with medial facetectomy and bilateral foraminotomies   -- 22 modifier secondary to morbid obesity and multiple previous operations   with massive scar tissue.  3.  L5 partial laminectomy with medial facetectomy and bilateral   foraminotomies.  4.  Posterolateral arthrodesis L3-L4 and L4-L5 with use of locally harvested   bone graft and allograft chips.  5.  Instrumented stabilization, L3, L4, L5 with use of [employer] Solera   instrumentation.  6.  Use of Chorus navigation system.     SURGEON:  MD Michael Walter D.O. Physician Signed Physical Medicine & Rehab  Consults Date of Service: 1/2/2019 11:38 AM   Consult Orders:   IP CONSULT FOR PHYSIATRY [[id number]] ordered by LARS Mack at 01/02/19 1028      Expand All Collapse All                                              Physical Medicine and Rehabilitation Consultation                                                                        Initial Consult           Date of Consultation: 1/2/2019  Consulting provider: Michael Geronimo D.O.  Reason for consultation: assess for acute inpatient rehab appropriateness  Consulting physician Tamara Gasca     Chief complaint: low back pain     HPI: The patient is a 73 y.o. female with a past medical history of diabetes, obesity, hypertension, low back pain, status post multiple surgeries, presented on 12/28/2018  9:56 AM with neurogenic claudication with recurrent lumbar stenosis at L3-L5, status post L4 and L5 redo laminectomy, fixation of L3-L5 by Dr. Sheldon on 12/28.         She is status post L1 through L4 decompressive  "laminectomy on 01/2016, with wash out of the wound on March 2016, status post redo of lumbar laminectomy L4/L5 on 10/2016,      She complains about new pain in her left thigh, described as sharp 4-10/10 constant pain, worse with movement, improved with rest, no radiation past the thigh, no radiation up to the back, started after surgery. No loss of bowel or bladder control, no other associated symptoms. Previously using elavil which has improved the pain and sleep as well as gabapentin which has improved her pain.      The pain that was present before the surgery has improved, currently 0/10 was previously aching sensation going from back to bellow the knee.      She has gone to acute rehab in the past with good results  She was not on home O2 before this hospitalization, had altered smell before this hospitalization.       ROS:  Gen: No fatigue, confusion, no significant weight loss  Eyes: no blurry vision, double vision or pain in eyes  ENT: no changes in hearing, runny nose, nose bleeds, sinus pain  Endo: no episodes of low blood sugar  CV: No CP, palpitations,   Lungs: + shortness of breath, no changes in secretions, changes in cough, pain with coughing  Abd: No abd pain, nausea, vomiting, pain with eating  : no blood in urine, suprapubic pain  Ext/MSK: No swelling in legs, asymmetric atrophy  Neuro: she feels like she is getting stronger, no altered sensation  Skin: no new ulcers/skin breakdown appreciated by patient  Mood: No changes in mood today, increase in depression or anxiety  Heme: no bruising, or bleeding  Allergy: No recent allergies  Allergies   Allergen Reactions   • Codeine Itching       \"My head itches.\"   • Other Environmental Rash       Requires Hypoallergenic Linen   • Tape Rash       Paper Tape ok         Social Hx:  Pre-morbidly, this patient lived in a two level home with Two  steps to enter, alone and able to care for self.   Tobacco: none  Alcohol: none  Drugs: none     Prior level of " "function:   Mod Independent, using FWW     Current level of function:  The patient was evaluated by acute care Physical Therapy and Occupational Therapy; currently requiring moderate assistance for mobility and maximal assistance for ADLs,      Physical Therapy Treatment completed.   Bed Mobility:  Supine to Sit:  (up in chair )  Transfers: Sit to Stand: Moderate Assist  Gait: Level Of Assist: Close Contact Guard Assist with Front-Wheel Walker for only 50ft.     Occupational Therapy Treatment completed with focus on ADLs, ADL transfers and patient education.  Functional Status:  Pt seen for OT tx. Mod A sit > stand from low surface and c/o pain in L hip. Amb to BR w/ FWW and CGA for balance and safety. Completed toilet transfer w/ min A, toilet w/ raised height requiring less assistance for sit > stand from low surface. Continues to require max A to don LSO. Max A LB dressing. Pt able to appropriately maintain spinal precautions during session. Pt demonstrated tolerance for light grooming at the sink but continues to be limited by decreased activity tolerance and pain. Pt pleasant and cooperative. Pt motivated to regain strength, endurance and independence in ADLs and ADL transfers.      Physical Exam:  Vitals: Blood pressure 127/47, pulse 62, temperature 36.4 °C (97.6 °F), temperature source Temporal, resp. rate 17, height 1.676 m (5' 6\"), weight 120.7 kg (266 lb 1.5 oz), SpO2 95 %, not currently breastfeeding.  Gen: NAD, sitting comfortably in chair  HEENT: NC/AT, PERRLA, moist mucous membranes  Cardio: RRR, no mumurs  Pulm: CTAB, with normal respiratory effort  Abd: Soft NTND, active bowel sounds,   Ext: No peripheral edema. No calf tenderness. No clubbing/cyanosis. +dorsal pedalis pulses bilaterally.     Mental status:  A&Ox4 (person, place, date, situation) answers questions appropriately follows commands  Speech: fluent, no aphasia or dysarthria     CRANIAL NERVES:  2,3: visual acuity grossly intact, " PERRL  3,4,6: EOMI bilaterally, no nystagmus or diplopia  5: sensation intact to light touch bilaterally and symmetric  7: no facial asymmetry  8: hearing grossly intact  9,10: symmetric palate elevation  11: SCM/Trapezius strength 5/5 bilaterally  12: tongue protrudes midline     Motor:                        Shoulder flexors:  Bilateral -  5/5  Elbow flexors:  Bilateral -  5/5  Wrist extensors:  Bilateral -  5/5  Elbow extensors:  Bilateral -  5/5  Finger flexors:  Bilateral -  5/5  Finger abductors:  Bilateral -  5/5  Hip flexors:  Right -  1/5, Left -  1/5  Knee ext:  Right -  3/5, Left -  4/5  Dorsiflexors:  Right -  3/5, Left -  4/5  EHL:  Right -  4/5, Left -  4/5  Plantar flexors:  Right -  4/5, Left -  4/5   Negative Pronator drift bilaterally     Sensory:   intact to light touch through out        DTRs: 1+ in bilateral biceps, triceps, brachioradialis, 1+ in bilateral patellar and achilles tendons  No clonus at bilateral ankles  Negative babinski b/l  Negative Coulter b/l      Tone: no spasticity noted, no cogwheeling noted     Coordination:   intact finger to nose bilaterally        Labs:  No results for input(s): RBC, HEMOGLOBIN, HEMATOCRIT, PLATELETCT, PROTHROMBTM, APTT, INR, IRON, FERRITIN, TOTIRONBC in the last 72 hours.      Recent Results         Recent Results (from the past 24 hour(s))   ACCU-CHEK GLUCOSE     Collection Time: 01/01/19  8:22 PM   Result Value Ref Range     Glucose - Accu-Ck 195 (H) 65 - 99 mg/dL   ACCU-CHEK GLUCOSE     Collection Time: 01/02/19  5:19 AM   Result Value Ref Range     Glucose - Accu-Ck 104 (H) 65 - 99 mg/dL            ASSESSMENT:  L3-5 stenosis: Neurogenic claudication, status post L3-L5 repeat laminotomy with fixation by Dr. Sheldon on 12/28  - Strength is improving since surgery.   - pain has improved.   - LSO when OOB  - staples in place for 14 days, spinal precautions     Pain: sharp pain left thigh  -Amitriptyline 20 mg nightly  -Gabapentin 200/600 mg, increase to  400/600mg, monitor for fatigue  -Percocet 1 tablet every 4 hours as needed pain, currently taking 2-3 tablets/day     HTN: monitor BP when OOB, 127-163/47-58  - check orthostatic BP     O2 requirements:  - check CXR, check VC, use IS  - cont O2 and wean as tolerated, monitor O2 during activity     Rehabilitation: Impaired ADLs and mobility, LILIAM lumbar radiculopathy, incomplete  - pt would benefit from and tolerate 3 hours of therapy per day with plan to discharge home with house mate able to help with assistance at home, son is available as well.      Discussed with pt, summarized hospitalization and care, options for next step of care     Discussed with team about recommendations        Discussed with patient about recommendations for and plan for rehabilitation as follows. Patient with multiple co-morbidities(including but not limited to low back pain, thigh pain, s/p laminectomy, HTN, DM, oxygen requirements, obesity); with functional deficits in mobility/self-care, and Moderate de-conditioning.      Pre-morbidly, this patient lived in a two level home with Two  steps to enter, alone and able to care for self. The patient was evaluated by acute care Physical Therapy and Occupational Therapy; currently requiring moderate assistance for mobility and maximal assistance for ADLs     The patient is a(n) Very Good candidate for an acute inpatient rehabilitation program with a coordinated program of care at an intensity and frequency not available at a lower level of care.      Note: if patient continues to progress while waiting for medical clearance, and no longer requires 2 of of 3 therapy services (PT/OT/SLP) at a CGA/Minimal assistance level, patient will no longer need acute inpatient rehabilitation.     This recommendation is substantiated by the patient's current medical condition with intervention and assessment of medical issues requiring an acute level of care for patient's safety and maximum outcome. A  coordinated program of care will be provided by an interdisciplinary team including physical therapy, occupational therapy, hospitalist, physiatry, rehab nursing and rehab psychology. Rehab goals include improved mobility, self-care management, strength and conditioning/endurance, pain management, bowel and bladder management, mood and affect, and safety with independent home management including caregiver training.      Estimated length of stay is approximately 10-14 days. Rehab potential: Very Good. Disposition: to pre-morbid independent living setting with supportive care of patient's family. We will continue to follow with you in anticipation of discharge to acute inpatient rehabilitation when medically stable to do so at the discretion of her  attending physician. Thank you for allowing us to participate in her care. Please call with any questions regarding this recommendation.     Michael Geronimo D.O.  Physical Medicine and Rehabilitation          Co-morbidities: as listed above and below.   Potential Risk - Complications: Contractures, Deep Vein Thrombosis, Incontinence, Pain, Perceptual Impairment, Urinary Tract Infection and wound care and infection risk   Level of Risk: High    Ongoing Medical Management Needed (Medical/Nursing Needs):   Patient Active Problem List    Diagnosis Date Noted   • Cervical myelopathy (HCC) 09/23/2015     Priority: High     Class: Acute   • Debility 11/10/2016   • Lumbar stenosis 11/07/2016   • Postoperative CSF leak 11/07/2016   • Stenosis, spinal, lumbar 10/19/2016   • Cervical stenosis of spine 10/19/2016   • Dysesthesia 10/19/2016   • Dehiscence of incision 04/05/2016   • Asthma 01/28/2016   • Impaired mobility and ADLs 01/27/2016   • Intervertebral disc disorder with radiculopathy of lumbosacral region 01/22/2016   • Spinal stenosis in cervical region 09/16/2015   • Acute respiratory failure with hypercapnia ([facility]) 09/16/2015   • Congestive heart failure ([facility]) 09/16/2015  "  • DM2 (diabetes mellitus, type 2) (HCC) 09/16/2015   • Hypothyroid 09/16/2015   • HTN (hypertension) 09/16/2015   • Hypothyroid 09/16/2015   • Chronic kidney disease, stage II (mild) 07/09/2015   • Renal cyst, native, hemorrhage 07/09/2015   • Renal cysts, acquired, bilateral 07/09/2015   • HTN (hypertension) 07/09/2015   • Low back pain 04/21/2015   • Acute renal failure (HCC) 07/19/2014     Jessica Barrett R.N. Registered Nurse Signed   Progress Notes Date of Service: 1/2/2019  7:26 PM           Bedside report recieved, accepted care. Pt is A&Ox4, reports pain, medicated per mar. Reports numbness/tingling bilateral hands/feet. On 2 lpm O2 via nc.      POC discussed. No further needs at this time.   Bed alarm on. Bed locked and in bed in low position, call light and belongings within reach, upper rails up. Treaded socks           Current Vital Signs:   Temperature: 36.8 °C (98.3 °F) Pulse: 68 Respiration: 17 Blood Pressure : 158/60  Weight: 120.7 kg (266 lb 1.5 oz) Height: 167.6 cm (5' 6\")  Pulse Oximetry: 92 % O2 (LPM): 2      Completed Laboratory Reports:  Recent Labs      12/31/18   2111 01/01/19   0549  01/01/19 2022 01/02/19   0519  01/02/19   1728  01/02/19 2055 01/03/19   0552   POCGLUCOSE  190*  114*  195*  104*  103*  175*  96   Results for ARIELA TRACY (MRN [id number]) as of 1/3/2019 07:52   Ref. Range 12/30/2018 02:38   WBC Latest Ref Range: 4.8 - 10.8 K/uL 10.9 (H)   RBC Latest Ref Range: 4.20 - 5.40 M/uL 3.65 (L)   Hemoglobin Latest Ref Range: 12.0 - 16.0 g/dL 11.2 (L)   Hematocrit Latest Ref Range: 37.0 - 47.0 % 35.0 (L)   MCV Latest Ref Range: 81.4 - 97.8 fL 95.9   MCH Latest Ref Range: 27.0 - 33.0 pg 30.7   MCHC Latest Ref Range: 33.6 - 35.0 g/dL 32.0 (L)   RDW Latest Ref Range: 35.9 - 50.0 fL 46.1   Platelet Count Latest Ref Range: 164 - 446 K/uL 207   MPV Latest Ref Range: 9.0 - 12.9 fL 10.8   Results for ARIELA TRACY (MRN [id number]) as of 1/3/2019 07:52   Ref. Range " 12/30/2018 02:37   Sodium Latest Ref Range: 135 - 145 mmol/L 138   Potassium Latest Ref Range: 3.6 - 5.5 mmol/L 4.3   Chloride Latest Ref Range: 96 - 112 mmol/L 106   Co2 Latest Ref Range: 20 - 33 mmol/L 27   Anion Gap Latest Ref Range: 0.0 - 11.9  5.0   Glucose Latest Ref Range: 65 - 99 mg/dL 151 (H)   Bun Latest Ref Range: 8 - 22 mg/dL 13   Creatinine Latest Ref Range: 0.50 - 1.40 mg/dL 0.96   GFR If  Latest Ref Range: >60 mL/min/1.73 m 2 >60   GFR If Non  Latest Ref Range: >60 mL/min/1.73 m 2 57 (A)   Calcium Latest Ref Range: 8.5 - 10.5 mg/dL 8.8       Additional Labs: Not Applicable    Prior Living Situation:   Housing / Facility: 2 Story House (but pt stays at one level)  Steps Into Home: 2  Lives with - Patient's Self Care Capacity: Friends  Equipment Owned: 4-Wheel Walker, Grab Bar(s) In Tub / Shower, Grab Bar(s) By Toilet, Front-Wheel Walker    Prior Level of Function / Living Situation:   Physical Therapy: Prior Services: None  Housing / Facility: 2 Story House (but pt stays at one level)  Steps Into Home: 2  Bathroom Set up: Walk In Shower, Grab Bars, Shower Chair  Equipment Owned: 4-Wheel Walker, Grab Bar(s) In Tub / Shower, Grab Bar(s) By Toilet, Front-Wheel Walker  Lives with - Patient's Self Care Capacity: Friends  Bed Mobility: Independent  Transfer Status: Independent  Ambulation: Independent  Distance Ambulation (Feet):  (Household distances)  Assistive Devices Used: Front-Wheel Walker  Current Level of Function:   Level Of Assist: Contact Guard Assist  Assistive Device: Front Wheel Walker  Distance (Feet): 50  Deviation: Bradykinetic, Shuffled Gait  # of Stairs Climbed: 0  Skilled Intervention: Verbal Cuing, Sequencing, Tactile Cuing  Comments: Pt able to progress with gait. COntinues to require close CGA for safety and balance. No knee buckling noted today. Pt demonstrated slow luigi with decreased step length and height.   Supine to Sit:  (up in chair )  Sit to  Supine: Moderate Assist (for BLE )  Scooting: Contact Guard Assist  Rolling: Moderate Assist to Lt.  Skilled Intervention: Verbal Cuing, Tactile Cuing  Comments: Cues for log rolling technique, pt does utilize bed rail and requires assist for BLE.   Sit to Stand: Moderate Assist  Bed, Chair, Wheelchair Transfer: Minimal Assist  Toilet Transfers: Minimal Assist  Transfer Method: Other (Comments) (Ambulating)  Skilled Intervention: Verbal Cuing, Compensatory Strategies  Comments: Pt required less attempts today to complete sit to stand transfer but did continue to require modA . Pt requested use of restroom where she required Celestino due to elevated seat height. Pt continues to report throughout day with nursing staff left knee at times will buckle   Sitting in Chair: NT  Sitting Edge of Bed: 15 min  Standing: 10 min total  Occupational Therapy:   Self Feeding: Independent  Grooming / Hygiene: Independent  Bathing: Independent  Dressing: Independent  Toileting: Independent  Medication Management: Independent  Laundry: Independent  Kitchen Mobility: Independent  Finances: Independent  Home Management: Requires Assist  Shopping: Requires Assist  Prior Level Of Mobility: Independent With Device in Community, Independent With Device in Home  Driving / Transportation: Relatives / Others Provide Transportation  Prior Services: None  Housing / Facility: 2 [place] [address] (but pt stays at one level)  Occupation (Pre-Hospital Vocational): Retired Due To Age  Current Level of Function:   Eating: Independent  Upper Body Dressing: Maximal Assist (LSO)  Lower Body Dressing: Maximal Assist  Toileting: Minimal Assist  Skilled Intervention: Verbal Cuing, Compensatory Strategies  Speech Language Pathology:      Rehabilitation Prognosis/Potential: Good  Estimated Length of Stay: 14 days    Nursing:   Orientation : Oriented x 4  Incontinent chronic per history     Scope/Intensity of Services Recommended:  Physical Therapy: 1.5 hr / day  5 days  / week. Therapeutic Interventions Required: Maximize Endurance, Mobility, Strength and Safety  Occupational Therapy: 1.5 hr / day 5 days / week. Therapeutic Interventions Required: Maximize Self Care, ADLs, IADLs and Energy Conservation  Rehabilitation Nursin/7. Therapeutic Interventions Required: Monitor Pain, Skin, Wound(s), Vital Signs, Intake and Output, Labs, Safety and bladder management   Rehabilitation Physician: 3 - 5 days / week. Therapeutic Interventions Required: Medical Management  Respiratory Care: evaluate. Therapeutic Interventions Required: Pulmonary Toileting and O2 Weaning  Dietician: consult . Therapeutic Interventions Required: nutritional need, DM, weight     Rehabilitation Goals and Plan (Expected frequency & duration of treatment in the IRF):   Return to the Community and Modified Independent Level of Care  Anticipated Date of Rehabilitation Admission: 2019  Patient/Family oriented IRF level of care/facility/plan: Yes  Patient/Family willing to participate in IRF care/facility/plan: Yes  Patient able to tolerate IRF level of care proposed: Yes  Patient has potential to benefit IRF level of care proposed: Yes  Comments: Not Applicable    Special Needs or Precautions - Medical Necessity:  Safety Concerns/Precautions:  Fall Risk / High Risk for Falls and Balance  Complex Wound Care: post surgica   Pain Management  IV Site: Peripheral  Requires Oxygen  Splints/Braces/Orthotics: LSO spine precautions   Current Medications:    Current Facility-Administered Medications Ordered in Epic   Medication Dose Route Frequency Provider Last Rate Last Dose   • amitriptyline (ELAVIL) tablet 20 mg  20 mg Oral QHS Zoila Diaz, A.P.N.   20 mg at 19   • amLODIPine (NORVASC) tablet 2.5 mg  2.5 mg Oral QHS Zoila Diaz, A.P.N.   2.5 mg at 19   • atorvastatin (LIPITOR) tablet 20 mg  20 mg Oral QHS Zoila Kenyon-Ward, A.P.N.   20 mg at 19    • gabapentin (NEURONTIN) capsule 200 mg  200 mg Oral QAM Zoila Diaz, A.P.N.   200 mg at 01/03/19 0548   • gabapentin (NEURONTIN) capsule 600 mg  600 mg Oral QHS Zoila Diaz A.P.N.   600 mg at 01/02/19 2111   • insulin glargine (LANTUS) injection 30 Units  30 Units Subcutaneous Nightly Zoila Diaz A.P.N.   30 Units at 01/02/19 2113   • levothyroxine (SYNTHROID) tablet 175 mcg  175 mcg Oral AM ES Zoila Diaz A.P.N.   175 mcg at 01/03/19 0548   • metFORMIN (GLUCOPHAGE) tablet 500 mg  500 mg Oral BID WITH MEALS Zoila Diaz A.P.N.   500 mg at 01/02/19 1744   • metoprolol SR (TOPROL XL) tablet 200 mg  200 mg Oral BID Zoila Diaz A.P.N.   200 mg at 01/03/19 0548   • oxyCODONE-acetaminophen (PERCOCET) 5-325 MG per tablet 1-2 Tab  1-2 Tab Oral Q4HRS PRN Zoila Diaz A.P.N.   1 Tab at 01/02/19 2111   • SITagliptin (JANUVIA) tablet 100 mg  100 mg Oral QAM Zoila Diaz A.P.N.   100 mg at 01/03/19 0548   • HYDROmorphone (DILAUDID) tablet 2 mg  2 mg Oral Q3HRS PRN Zoila Diaz A.P.N.       • budesonide-formoterol (SYMBICORT) 80-4.5 MCG/ACT inhaler 2 Puff  2 Puff Inhalation BID Zoila Diaz A.P.N.   2 Puff at 01/03/19 0550   • Pharmacy Consult Request ...Pain Management Review 1 Each  1 Each Other PRN SAMAN Aguilar.P.N.       • MD ALERT...DO NOT ADMINISTER NSAIDS or ASPIRIN unless ORDERED By Neurosurgery 1 Each  1 Each Other PRN SAMAN Aguilar.P.N.       • docusate sodium (COLACE) capsule 100 mg  100 mg Oral BID Zoila Diaz, A.P.N.   100 mg at 01/03/19 0548   • senna-docusate (PERICOLACE or SENOKOT S) 8.6-50 MG per tablet 1 Tab  1 Tab Oral Nightly Zoila Diaz, A.P.N.   1 Tab at 01/02/19 2110   • senna-docusate (PERICOLACE or SENOKOT S) 8.6-50 MG per tablet 1 Tab  1 Tab Oral Q24HRS PRN Zoila Diaz, A.P.N.       •  polyethylene glycol/lytes (MIRALAX) PACKET 1 Packet  1 Packet Oral BID PRN Zoila Diaz, A.P.N.   1 Packet at 12/30/18 2133   • magnesium hydroxide (MILK OF MAGNESIA) suspension 30 mL  30 mL Oral QDAY PRN Zoila Diaz, A.P.N.       • bisacodyl (DULCOLAX) suppository 10 mg  10 mg Rectal Q24HRS PRN Zoila Diaz, A.P.N.       • fleet enema 133 mL  1 Each Rectal Once PRN Zoila Diaz, A.P.N.       • 0.9 % NaCl with KCl 20 mEq infusion   Intravenous Continuous Zoila Diaz A.P.N.   Stopped at 12/29/18 2200   • diphenhydrAMINE (BENADRYL) tablet/capsule 25 mg  25 mg Oral Q6HRS PRN Zoila Diaz, A.P.N.        Or   • diphenhydrAMINE (BENADRYL) injection 25 mg  25 mg Intravenous Q6HRS PRN Zoila Diaz, A.P.N.       • ondansetron (ZOFRAN) syringe/vial injection 4 mg  4 mg Intravenous Q4HRS PRN Zoila Diaz, A.P.N.       • ondansetron (ZOFRAN ODT) dispertab 4 mg  4 mg Oral Q4HRS PRN Zoila Diaz, A.P.N.       • tizanidine (ZANAFLEX) tablet 2 mg  2 mg Oral TID PRN Zoila Diaz, A.P.N.   2 mg at 01/02/19 0845   • hydrALAZINE (APRESOLINE) injection 10 mg  10 mg Intravenous Q HOUR PRN Zoila Diaz, A.P.N.       • benzocaine-menthol (CEPACOL) lozenge 1 Lozenge  1 Lozenge Mouth/Throat Q2HRS PRN Zoila Diaz, A.P.N.       • calcium carbonate (TUMS) chewable tab 500 mg  500 mg Oral BID Zoila Diaz, A.P.N.   500 mg at 01/03/19 0548     No current Epic-ordered outpatient prescriptions on file.     Diet:   DIET ORDERS (Through next 24h)    Start     Ordered    12/29/18 1705  Dietary Comment  ALL MEALS     Comments:  Boost glucose control in between meals @ 0900 & 1430    12/29/18 1706    12/29/18 1704  Diet Order Diabetic  ALL MEALS     Question Answer Comment   Diet: Diabetic    Calorie modifications: 2000 kcals        12/29/18 1703          Anticipated Discharge  Destination / Patient/Family Goal:  Destination: Home with Assistance Support System: Friends  Anticipated home health services: OT and PT  Previously used HH service/ provider: Not Applicable  Anticipated DME Needs: Oxygen and Walker  Outpatient Services: PT  Alternative resources to address additional identified needs:     Pre-Screen Completed: 1/3/2019 7:45 AM Juan M Valle

## 2019-01-03 NOTE — FLOWSHEET NOTE
01/03/19 1250   Events/Summary/Plan   Events/Summary/Plan pt has had productive cough since surgery; encourage deep breathing   Incentive Spirometry Group   Incentive Spirometry Instruction Yes   Breathing Exercises Yes   Incentive Spirometer Volume 1500 mL   Incentive Spirometer Date Last Changed 01/03/19   Incentive Spirometer Next Change Date (Q 30 Days) 02/03/19   Chest Exam   Respiration 18   Pulse 68   Breath Sounds   RLL Breath Sounds Diminished   LLL Breath Sounds Diminished   Secretions   Cough Productive  (per pt)   Sputum Color Yellow   Sputum Consistency Thick   Oximetry   #Pulse Oximetry (Single Determination) Yes   Oxygen   Home O2 Use Prior To Admission? No   Pulse Oximetry 93 %   O2 Daily Delivery Respiratory  Room Air with O2 Available

## 2019-01-03 NOTE — DISCHARGE INSTRUCTIONS
Discharge Instructions    Discharged to other by medical transportation with escort. Discharged via wheelchair, hospital escort: Yes.  Special equipment needed: LSO    Be sure to schedule a follow-up appointment with your primary care doctor or any specialists as instructed.     Discharge Plan:   Influenza Vaccine Indication: Not indicated: Previously immunized this influenza season and > 8 years of age    I understand that a diet low in cholesterol, fat, and sodium is recommended for good health. Unless I have been given specific instructions below for another diet, I accept this instruction as my diet prescription.   Other diet: Reg    Special Instructions:  Ambulate as much as comfortable   Wear LSO brace when out of bed   Ok to shower, pat incision dry, leave open to air- no dressing   No Aspirin or non-steroidal anti-inflammatory medications (aleve, motrin, ibuprofen, celebrex)   Over the counter stool softeners daily while on narcotics   No lifting greater than 15 pounds, no repetitive bending or twisting   Staples to be removed 1/11/19   Follow up at [facility] 2 weeks after surgery if home before staples removed    · Is patient discharged on Warfarin / Coumadin?   No     Depression / Suicide Risk    As you are discharged from this [facility] facility, it is important to learn how to keep safe from harming yourself.    Recognize the warning signs:  · Abrupt changes in personality, positive or negative- including increase in energy   · Giving away possessions  · Change in eating patterns- significant weight changes-  positive or negative  · Change in sleeping patterns- unable to sleep or sleeping all the time   · Unwillingness or inability to communicate  · Depression  · Unusual sadness, discouragement and loneliness  · Talk of wanting to die  · Neglect of personal appearance   · Rebelliousness- reckless behavior  · Withdrawal from people/activities they love  · Confusion- inability to  concentrate     If you or a loved one observes any of these behaviors or has concerns about self-harm, here's what you can do:  · Talk about it- your feelings and reasons for harming yourself  · Remove any means that you might use to hurt yourself (examples: pills, rope, extension cords, firearm)  · Get professional help from the community (Mental Health, Substance Abuse, psychological counseling)  · Do not be alone:Call your Safe Contact- someone whom you trust who will be there for you.  · Call your local CRISIS HOTLINE 601-7131 or 718-022-1607  · Call your local Children's Mobile Crisis Response Team [place] (775) 577-3570 or www.TurtleCell  · Call the toll free National Suicide Prevention Hotlines   · National Suicide Prevention Lifeline 400-732-RFEH (9670)  · National Hope Line Network 800-SUICIDE (760-8426)

## 2019-01-03 NOTE — PROGRESS NOTES
Bedside report recieved, accepted care. Pt is A&Ox4, reports pain, medicated per mar. Reports numbness/tingling bilateral hands/feet. On 2 lpm O2 via nc.     POC discussed. No further needs at this time.   Bed alarm on. Bed locked and in bed in low position, call light and belongings within reach, upper rails up. Treaded socks on.

## 2019-01-04 PROBLEM — R60.0 PEDAL EDEMA: Status: ACTIVE | Noted: 2019-01-04

## 2019-01-04 PROBLEM — E55.9 VITAMIN D DEFICIENCY: Status: ACTIVE | Noted: 2019-01-04

## 2019-01-04 LAB
25(OH)D3 SERPL-MCNC: 6 NG/ML (ref 30–100)
ALBUMIN SERPL BCP-MCNC: 3.2 G/DL (ref 3.2–4.9)
ALBUMIN/GLOB SERPL: 1.3 G/DL
ALP SERPL-CCNC: 69 U/L (ref 30–99)
ALT SERPL-CCNC: 7 U/L (ref 2–50)
ANION GAP SERPL CALC-SCNC: 9 MMOL/L (ref 0–11.9)
AST SERPL-CCNC: 13 U/L (ref 12–45)
BASOPHILS # BLD AUTO: 1.1 % (ref 0–1.8)
BASOPHILS # BLD: 0.11 K/UL (ref 0–0.12)
BILIRUB SERPL-MCNC: 0.4 MG/DL (ref 0.1–1.5)
BUN SERPL-MCNC: 17 MG/DL (ref 8–22)
CALCIUM SERPL-MCNC: 9.5 MG/DL (ref 8.5–10.5)
CHLORIDE SERPL-SCNC: 104 MMOL/L (ref 96–112)
CHOLEST SERPL-MCNC: 126 MG/DL (ref 100–199)
CO2 SERPL-SCNC: 28 MMOL/L (ref 20–33)
CREAT SERPL-MCNC: 0.9 MG/DL (ref 0.5–1.4)
EOSINOPHIL # BLD AUTO: 0.25 K/UL (ref 0–0.51)
EOSINOPHIL NFR BLD: 2.4 % (ref 0–6.9)
ERYTHROCYTE [DISTWIDTH] IN BLOOD BY AUTOMATED COUNT: 43.3 FL (ref 35.9–50)
EST. AVERAGE GLUCOSE BLD GHB EST-MCNC: 148 MG/DL
GLOBULIN SER CALC-MCNC: 2.5 G/DL (ref 1.9–3.5)
GLUCOSE BLD-MCNC: 117 MG/DL (ref 65–99)
GLUCOSE BLD-MCNC: 123 MG/DL (ref 65–99)
GLUCOSE BLD-MCNC: 133 MG/DL (ref 65–99)
GLUCOSE BLD-MCNC: 142 MG/DL (ref 65–99)
GLUCOSE BLD-MCNC: 185 MG/DL (ref 65–99)
GLUCOSE SERPL-MCNC: 118 MG/DL (ref 65–99)
HBA1C MFR BLD: 6.8 % (ref 0–5.6)
HCT VFR BLD AUTO: 34.5 % (ref 37–47)
HDLC SERPL-MCNC: 35 MG/DL
HGB BLD-MCNC: 11.4 G/DL (ref 12–16)
IMM GRANULOCYTES # BLD AUTO: 0.05 K/UL (ref 0–0.11)
IMM GRANULOCYTES NFR BLD AUTO: 0.5 % (ref 0–0.9)
LDLC SERPL CALC-MCNC: 52 MG/DL
LYMPHOCYTES # BLD AUTO: 2.16 K/UL (ref 1–4.8)
LYMPHOCYTES NFR BLD: 20.9 % (ref 22–41)
MAGNESIUM SERPL-MCNC: 1.7 MG/DL (ref 1.5–2.5)
MCH RBC QN AUTO: 30.6 PG (ref 27–33)
MCHC RBC AUTO-ENTMCNC: 33 G/DL (ref 33.6–35)
MCV RBC AUTO: 92.7 FL (ref 81.4–97.8)
MONOCYTES # BLD AUTO: 1.18 K/UL (ref 0–0.85)
MONOCYTES NFR BLD AUTO: 11.4 % (ref 0–13.4)
NEUTROPHILS # BLD AUTO: 6.58 K/UL (ref 2–7.15)
NEUTROPHILS NFR BLD: 63.7 % (ref 44–72)
NRBC # BLD AUTO: 0 K/UL
NRBC BLD-RTO: 0 /100 WBC
PHOSPHATE SERPL-MCNC: 2.8 MG/DL (ref 2.5–4.5)
PLATELET # BLD AUTO: 330 K/UL (ref 164–446)
PMV BLD AUTO: 10.6 FL (ref 9–12.9)
POTASSIUM SERPL-SCNC: 3.8 MMOL/L (ref 3.6–5.5)
PREALB SERPL-MCNC: 14 MG/DL (ref 18–38)
PROT SERPL-MCNC: 5.7 G/DL (ref 6–8.2)
RBC # BLD AUTO: 3.72 M/UL (ref 4.2–5.4)
SODIUM SERPL-SCNC: 141 MMOL/L (ref 135–145)
TRIGL SERPL-MCNC: 193 MG/DL (ref 0–149)
TSH SERPL DL<=0.005 MIU/L-ACNC: 0.5 UIU/ML (ref 0.38–5.33)
WBC # BLD AUTO: 10.3 K/UL (ref 4.8–10.8)

## 2019-01-04 PROCEDURE — 700111 HCHG RX REV CODE 636 W/ 250 OVERRIDE (IP): Performed by: PHYSICAL MEDICINE & REHABILITATION

## 2019-01-04 PROCEDURE — 99223 1ST HOSP IP/OBS HIGH 75: CPT | Performed by: HOSPITALIST

## 2019-01-04 PROCEDURE — 700102 HCHG RX REV CODE 250 W/ 637 OVERRIDE(OP): Performed by: PHYSICAL MEDICINE & REHABILITATION

## 2019-01-04 PROCEDURE — 84134 ASSAY OF PREALBUMIN: CPT

## 2019-01-04 PROCEDURE — A9270 NON-COVERED ITEM OR SERVICE: HCPCS | Performed by: PHYSICAL MEDICINE & REHABILITATION

## 2019-01-04 PROCEDURE — 84443 ASSAY THYROID STIM HORMONE: CPT

## 2019-01-04 PROCEDURE — 80061 LIPID PANEL: CPT

## 2019-01-04 PROCEDURE — 80053 COMPREHEN METABOLIC PANEL: CPT

## 2019-01-04 PROCEDURE — 85025 COMPLETE CBC W/AUTO DIFF WBC: CPT

## 2019-01-04 PROCEDURE — 97166 OT EVAL MOD COMPLEX 45 MIN: CPT

## 2019-01-04 PROCEDURE — 770010 HCHG ROOM/CARE - REHAB SEMI PRIVAT*

## 2019-01-04 PROCEDURE — 97530 THERAPEUTIC ACTIVITIES: CPT

## 2019-01-04 PROCEDURE — 36415 COLL VENOUS BLD VENIPUNCTURE: CPT

## 2019-01-04 PROCEDURE — 99232 SBSQ HOSP IP/OBS MODERATE 35: CPT | Performed by: PHYSICAL MEDICINE & REHABILITATION

## 2019-01-04 PROCEDURE — 700112 HCHG RX REV CODE 229: Performed by: PHYSICAL MEDICINE & REHABILITATION

## 2019-01-04 PROCEDURE — 97116 GAIT TRAINING THERAPY: CPT

## 2019-01-04 PROCEDURE — 82962 GLUCOSE BLOOD TEST: CPT

## 2019-01-04 PROCEDURE — 83036 HEMOGLOBIN GLYCOSYLATED A1C: CPT

## 2019-01-04 PROCEDURE — 84100 ASSAY OF PHOSPHORUS: CPT

## 2019-01-04 PROCEDURE — 83735 ASSAY OF MAGNESIUM: CPT

## 2019-01-04 PROCEDURE — 82306 VITAMIN D 25 HYDROXY: CPT

## 2019-01-04 PROCEDURE — 97535 SELF CARE MNGMENT TRAINING: CPT

## 2019-01-04 PROCEDURE — 97161 PT EVAL LOW COMPLEX 20 MIN: CPT

## 2019-01-04 RX ORDER — ACETAMINOPHEN 325 MG/1
975 TABLET ORAL EVERY 8 HOURS
Status: DISCONTINUED | OUTPATIENT
Start: 2019-01-04 | End: 2019-01-12 | Stop reason: HOSPADM

## 2019-01-04 RX ADMIN — ATORVASTATIN CALCIUM 20 MG: 10 TABLET, FILM COATED ORAL at 20:26

## 2019-01-04 RX ADMIN — GABAPENTIN 300 MG: 300 CAPSULE ORAL at 08:32

## 2019-01-04 RX ADMIN — AMLODIPINE BESYLATE 2.5 MG: 5 TABLET ORAL at 20:25

## 2019-01-04 RX ADMIN — METOPROLOL SUCCINATE 200 MG: 100 TABLET, EXTENDED RELEASE ORAL at 08:32

## 2019-01-04 RX ADMIN — LEVOTHYROXINE SODIUM 50 MCG: 50 TABLET ORAL at 05:48

## 2019-01-04 RX ADMIN — GABAPENTIN 600 MG: 300 CAPSULE ORAL at 20:26

## 2019-01-04 RX ADMIN — DOCUSATE SODIUM 100 MG: 100 CAPSULE, LIQUID FILLED ORAL at 08:33

## 2019-01-04 RX ADMIN — GABAPENTIN 300 MG: 300 CAPSULE ORAL at 14:10

## 2019-01-04 RX ADMIN — ACETAMINOPHEN 975 MG: 325 TABLET, FILM COATED ORAL at 22:04

## 2019-01-04 RX ADMIN — CALCIUM CARBONATE (ANTACID) CHEW TAB 500 MG 500 MG: 500 CHEW TAB at 08:33

## 2019-01-04 RX ADMIN — BUDESONIDE AND FORMOTEROL FUMARATE DIHYDRATE 2 PUFF: 80; 4.5 AEROSOL RESPIRATORY (INHALATION) at 20:26

## 2019-01-04 RX ADMIN — SITAGLIPTIN 100 MG: 50 TABLET, FILM COATED ORAL at 08:32

## 2019-01-04 RX ADMIN — METOPROLOL SUCCINATE 200 MG: 100 TABLET, EXTENDED RELEASE ORAL at 20:25

## 2019-01-04 RX ADMIN — AMITRIPTYLINE HYDROCHLORIDE 20 MG: 10 TABLET, FILM COATED ORAL at 20:25

## 2019-01-04 RX ADMIN — ACETAMINOPHEN 650 MG: 325 TABLET, FILM COATED ORAL at 05:48

## 2019-01-04 RX ADMIN — LEVOTHYROXINE SODIUM 125 MCG: 125 TABLET ORAL at 05:49

## 2019-01-04 RX ADMIN — METFORMIN HYDROCHLORIDE 500 MG: 500 TABLET, FILM COATED ORAL at 18:01

## 2019-01-04 RX ADMIN — ENOXAPARIN SODIUM 40 MG: 100 INJECTION SUBCUTANEOUS at 20:26

## 2019-01-04 RX ADMIN — METFORMIN HYDROCHLORIDE 500 MG: 500 TABLET, FILM COATED ORAL at 08:33

## 2019-01-04 RX ADMIN — CALCIUM CARBONATE (ANTACID) CHEW TAB 500 MG 500 MG: 500 CHEW TAB at 20:25

## 2019-01-04 RX ADMIN — INSULIN GLARGINE 30 UNITS: 100 INJECTION, SOLUTION SUBCUTANEOUS at 20:27

## 2019-01-04 RX ADMIN — ACETAMINOPHEN 975 MG: 325 TABLET, FILM COATED ORAL at 14:10

## 2019-01-04 RX ADMIN — VITAMIN D, TAB 1000IU (100/BT) 4000 UNITS: 25 TAB at 09:15

## 2019-01-04 RX ADMIN — BUDESONIDE AND FORMOTEROL FUMARATE DIHYDRATE 2 PUFF: 80; 4.5 AEROSOL RESPIRATORY (INHALATION) at 08:44

## 2019-01-04 RX ADMIN — OXYCODONE HYDROCHLORIDE 5 MG: 5 TABLET ORAL at 05:48

## 2019-01-04 RX ADMIN — OXYCODONE HYDROCHLORIDE 5 MG: 5 TABLET ORAL at 22:04

## 2019-01-04 ASSESSMENT — PAIN SCALES - GENERAL
PAINLEVEL_OUTOF10: 4
PAINLEVEL_OUTOF10: 4
PAINLEVEL_OUTOF10: 0

## 2019-01-04 ASSESSMENT — BRIEF INTERVIEW FOR MENTAL STATUS (BIMS)
ASKED TO RECALL SOCK: YES, NO CUE REQUIRED
ASKED TO RECALL BLUE: YES, NO CUE REQUIRED
WHAT MONTH IS IT: ACCURATE WITHIN 5 DAYS
WHAT DAY OF THE WEEK IS IT: CORRECT
ASKED TO RECALL BED: YES, NO CUE REQUIRED
INITIAL REPETITION OF BED BLUE SOCK - FIRST ATTEMPT: 3
BIMS SUMMARY SCORE: 15
WHAT YEAR IS IT: CORRECT

## 2019-01-04 ASSESSMENT — ACTIVITIES OF DAILY LIVING (ADL): TOILETING: INDEPENDENT

## 2019-01-04 NOTE — REHAB-CM IDT TEAM NOTE
Case Management    DC Planning  DC destination/dispostion:  Patient lives with a friend in a 2 level home.  She does not need to access the second level.    DC Needs: She may be best to start with home health therapy unless mobility greatly improves.  Patient has a fww, 4ww, cane, grab bars and shower chair.  She will need follow up with PCP and neurosurgery.    Barriers to discharge: none at this time      Strengths: good support system    Section completed by:  Kat Gerber R.N.

## 2019-01-04 NOTE — CARE PLAN
Problem: Safety  Goal: Will remain free from injury  Outcome: PROGRESSING AS EXPECTED  Patient has remained free of injury during this shift. Uses call light appropriately to get assistance and does not test physical limitations. Demonstrates safe transfer techniques. Asks questions about medications and procedures appropriately.   Goal: Will remain free from falls  Outcome: PROGRESSING AS EXPECTED  Patient has remained free of falls during this shift. Uses call light appropriately to get help with transfers and does not attempt to transfer without assistance.       Intervention: Assess risk factors for falls  Dean-Anthony fall assessment completed (see flow sheets). Patient is a minimal fall risk.

## 2019-01-04 NOTE — CARE PLAN
Problem: Safety  Goal: Will remain free from injury  Outcome: PROGRESSING AS EXPECTED  Patient uses call light consistently and appropriately this shift.  Waits for assistance when needed and does not attempt self transfer.  Able to verbalize needs.     Problem: Pain Management  Goal: Pain level will decrease to patient's comfort goal  Outcome: PROGRESSING AS EXPECTED  Pt able to participate in therapies and activities this shift.Patient able to verbalize pain level and verbalize an acceptable level of pain.

## 2019-01-04 NOTE — PROGRESS NOTES
Rehab Progress Note     Encounter date: 1/4/2019  Today I met with the patient face to face in her room    Chief Complaint:  Spinal stenosis of lumbar region with neurogenic claudication , enthusiasm to work with therapy to get home    Interval Events (subjective)  Ms. Chaparro is doing very well today.  She denies any fevers, chills, headache, dizziness, chest pain, or shortness of breath.  She is continuing to experience pain in the left thigh.  This is worsened with transitions from supine to sitting and sitting to stand.  We discussed her admission labs including hemoglobin and vitamin D level.  We discussed high level vitamin D supplementation, and she was agreeable to this.  She has used minimal oxycodone since admission.    Objective:  VITAL SIGNS: /76   Pulse 65   Temp 37.1 °C (98.7 °F) (Oral)   Resp 18   Wt 115 kg (253 lb 9.6 oz)   SpO2 98%   Breastfeeding? No   BMI 40.93 kg/m²     Recent Results (from the past 72 hour(s))   ACCU-CHEK GLUCOSE    Collection Time: 01/01/19  8:22 PM   Result Value Ref Range    Glucose - Accu-Ck 195 (H) 65 - 99 mg/dL   ACCU-CHEK GLUCOSE    Collection Time: 01/02/19  5:19 AM   Result Value Ref Range    Glucose - Accu-Ck 104 (H) 65 - 99 mg/dL   ACCU-CHEK GLUCOSE    Collection Time: 01/02/19  5:28 PM   Result Value Ref Range    Glucose - Accu-Ck 103 (H) 65 - 99 mg/dL   ACCU-CHEK GLUCOSE    Collection Time: 01/02/19  8:55 PM   Result Value Ref Range    Glucose - Accu-Ck 175 (H) 65 - 99 mg/dL   ACCU-CHEK GLUCOSE    Collection Time: 01/03/19  5:52 AM   Result Value Ref Range    Glucose - Accu-Ck 96 65 - 99 mg/dL   ACCU-CHEK GLUCOSE    Collection Time: 01/03/19  4:58 PM   Result Value Ref Range    Glucose - Accu-Ck 82 65 - 99 mg/dL   ACCU-CHEK GLUCOSE    Collection Time: 01/03/19  9:24 PM   Result Value Ref Range    Glucose - Accu-Ck 142 (H) 65 - 99 mg/dL   CBC with Differential    Collection Time: 01/04/19  5:51 AM   Result Value Ref Range    WBC 10.3 4.8 - 10.8 K/uL     RBC 3.72 (L) 4.20 - 5.40 M/uL    Hemoglobin 11.4 (L) 12.0 - 16.0 g/dL    Hematocrit 34.5 (L) 37.0 - 47.0 %    MCV 92.7 81.4 - 97.8 fL    MCH 30.6 27.0 - 33.0 pg    MCHC 33.0 (L) 33.6 - 35.0 g/dL    RDW 43.3 35.9 - 50.0 fL    Platelet Count 330 164 - 446 K/uL    MPV 10.6 9.0 - 12.9 fL    Neutrophils-Polys 63.70 44.00 - 72.00 %    Lymphocytes 20.90 (L) 22.00 - 41.00 %    Monocytes 11.40 0.00 - 13.40 %    Eosinophils 2.40 0.00 - 6.90 %    Basophils 1.10 0.00 - 1.80 %    Immature Granulocytes 0.50 0.00 - 0.90 %    Nucleated RBC 0.00 /100 WBC    Neutrophils (Absolute) 6.58 2.00 - 7.15 K/uL    Lymphs (Absolute) 2.16 1.00 - 4.80 K/uL    Monos (Absolute) 1.18 (H) 0.00 - 0.85 K/uL    Eos (Absolute) 0.25 0.00 - 0.51 K/uL    Baso (Absolute) 0.11 0.00 - 0.12 K/uL    Immature Granulocytes (abs) 0.05 0.00 - 0.11 K/uL    NRBC (Absolute) 0.00 K/uL   Comp Metabolic Panel (CMP)    Collection Time: 01/04/19  5:51 AM   Result Value Ref Range    Sodium 141 135 - 145 mmol/L    Potassium 3.8 3.6 - 5.5 mmol/L    Chloride 104 96 - 112 mmol/L    Co2 28 20 - 33 mmol/L    Anion Gap 9.0 0.0 - 11.9    Glucose 118 (H) 65 - 99 mg/dL    Bun 17 8 - 22 mg/dL    Creatinine 0.90 0.50 - 1.40 mg/dL    Calcium 9.5 8.5 - 10.5 mg/dL    AST(SGOT) 13 12 - 45 U/L    ALT(SGPT) 7 2 - 50 U/L    Alkaline Phosphatase 69 30 - 99 U/L    Total Bilirubin 0.4 0.1 - 1.5 mg/dL    Albumin 3.2 3.2 - 4.9 g/dL    Total Protein 5.7 (L) 6.0 - 8.2 g/dL    Globulin 2.5 1.9 - 3.5 g/dL    A-G Ratio 1.3 g/dL   Lipid Profile (Lipid Panel)    Collection Time: 01/04/19  5:51 AM   Result Value Ref Range    Cholesterol,Tot 126 100 - 199 mg/dL    Triglycerides 193 (H) 0 - 149 mg/dL    HDL 35 (A) >=40 mg/dL    LDL 52 <100 mg/dL   Magnesium    Collection Time: 01/04/19  5:51 AM   Result Value Ref Range    Magnesium 1.7 1.5 - 2.5 mg/dL   Phosphorus    Collection Time: 01/04/19  5:51 AM   Result Value Ref Range    Phosphorus 2.8 2.5 - 4.5 mg/dL   Prealbumin    Collection Time: 01/04/19  5:51  AM   Result Value Ref Range    Pre-Albumin 14.0 (L) 18.0 - 38.0 mg/dL   TSH with Reflex to FT4    Collection Time: 01/04/19  5:51 AM   Result Value Ref Range    TSH 0.500 0.380 - 5.330 uIU/mL   Vitamin D, 25-hydroxy (blood)    Collection Time: 01/04/19  5:51 AM   Result Value Ref Range    25-Hydroxy   Vitamin D 25 6 (L) 30 - 100 ng/mL   ESTIMATED GFR    Collection Time: 01/04/19  5:51 AM   Result Value Ref Range    GFR If African American >60 >60 mL/min/1.73 m 2    GFR If Non African American >60 >60 mL/min/1.73 m 2   ACCU-CHEK GLUCOSE    Collection Time: 01/04/19  7:32 AM   Result Value Ref Range    Glucose - Accu-Ck 117 (H) 65 - 99 mg/dL   ACCU-CHEK GLUCOSE    Collection Time: 01/04/19 11:28 AM   Result Value Ref Range    Glucose - Accu-Ck 123 (H) 65 - 99 mg/dL       Current Facility-Administered Medications   Medication Frequency   • vitamin D (cholecalciferol) tablet 4,000 Units DAILY   • acetaminophen (TYLENOL) tablet 975 mg Q8HRS   • Respiratory Care per Protocol Continuous RT   • Pharmacy Consult Request ...Pain Management Review 1 Each PRN   • hydrALAZINE (APRESOLINE) tablet 25 mg Q8HRS PRN   • artificial tears 1.4 % ophthalmic solution 1 Drop PRN   • benzocaine-menthol (CEPACOL) lozenge 1 Lozenge Q2HRS PRN   • mag hydrox-al hydrox-simeth (MAALOX PLUS ES or MYLANTA DS) suspension 20 mL Q2HRS PRN   • ondansetron (ZOFRAN ODT) dispertab 4 mg 4X/DAY PRN    Or   • ondansetron (ZOFRAN) syringe/vial injection 4 mg 4X/DAY PRN   • traZODone (DESYREL) tablet 50 mg QHS PRN   • sodium chloride (OCEAN) 0.65 % nasal spray 2 Spray PRN   • oxyCODONE immediate-release (ROXICODONE) tablet 5 mg Q4HRS PRN   • oxyCODONE immediate release (ROXICODONE) tablet 10 mg Q4HRS PRN   • enoxaparin (LOVENOX) inj 40 mg QHS   • bisacodyl (DULCOLAX) suppository 10 mg Q24HRS PRN   • calcium carbonate (TUMS) chewable tab 500 mg BID   • docusate sodium (COLACE) capsule 100 mg BID   • magnesium hydroxide (MILK OF MAGNESIA) suspension 30 mL QDAY  PRN   • senna-docusate (PERICOLACE or SENOKOT S) 8.6-50 MG per tablet 1 Tab Nightly   • tizanidine (ZANAFLEX) tablet 2 mg TID PRN   • amitriptyline (ELAVIL) tablet 20 mg QHS   • amLODIPine (NORVASC) tablet 2.5 mg QHS   • atorvastatin (LIPITOR) tablet 20 mg QHS   • budesonide-formoterol (SYMBICORT) 80-4.5 MCG/ACT inhaler 2 Puff BID   • gabapentin (NEURONTIN) capsule 600 mg QHS   • insulin glargine (LANTUS) injection 30 Units Nightly   • metFORMIN (GLUCOPHAGE) tablet 500 mg BID WITH MEALS   • metoprolol SR (TOPROL XL) tablet 200 mg BID   • SITagliptin (JANUVIA) tablet 100 mg QAM   • levothyroxine (SYNTHROID) tablet 125 mcg AM ES    And   • levothyroxine (SYNTHROID) tablet 50 mcg AM ES   • gabapentin (NEURONTIN) capsule 300 mg BID       Exam Date: 1/4/2019    General:  Awake, alert, oriented, no acute distress  Cardiac: regular rate and rhythm  Lungs: clear to auscultation bilaterally.   Abdomen: soft; non tender, non distended, bowel sounds present and normoactive  Extremities: Mild ankle edema  Neuro:   Ongoing weakness in the hip flexors.  Right hip flexor 3/5, left hip flexor 2+/5.  Knee extensor 4/5 on the right and 2+/5 on the left.    Orders Placed This Encounter   Procedures   • Diet Order Diabetic     Standing Status:   Standing     Number of Occurrences:   1     Order Specific Question:   Diet:     Answer:   Diabetic [3]     Order Specific Question:   Calorie modifications:     Answer:   2000 kcals [5]       Assessment:  Active Hospital Problems    Diagnosis   • *Spinal stenosis of lumbar region with neurogenic claudication   • Vitamin D deficiency   • Pain   • Mixed hyperlipidemia   • Anemia   • Impaired mobility and ADLs   • Congestive heart failure (HCC)   • Type 2 diabetes mellitus with hyperglycemia (HCC)   • Hypothyroid   • Hypothyroid   • HTN (hypertension)       Medical Decision Making and Plan:  Ms. Chaparro is a 73-year-old female admitted for rehabilitation on January 3 due to lumbar  stenosis     Lumbar stenosis status post L3-L5 posterior instrumented fusion by Dr. 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  Currently, we are dividing oxycodone and acetaminophen to prove pain control, but goal of transitioning back to Percocet by the time of discharge.     Elavil 20 mg at bedtime     Gabapentin increased on admission due to ongoing neuropathic pain  Gabapentin 300 mg in the morning and midday and 600 mg at bedtime  Potential to increase to 600 mg 3 times a day on January 5 if needed     Zanaflex 2 mg every 8 hours as needed    AST 13, ALT 7, alk phos 69 on January 4     Type 2 diabetes with hyperglycemia  Lantus 30 units nightly  Januvia 100 mg  Metformin 500 mg twice a day  Consulting hospitalist    Glucose range of 82-142in the last 24 hours     Hypertension  Congestive heart failure   Amlodipine 2.5 mg at bedtime  Toprol- mg twice daily    Blood pressure today is 138/76 mmHg     Hyperlipidemia  Lipitor 20 mg at bedtime  Total cholesterol 126, LDL 52, HDL 35 on January 4     Hypothyroidism  Synthroid 175 mcg of daily  TSH was 0.5 on January 4     Mild anemia   hemoglobin continues to gradually improve to 11.4 on January 4     Leukocytosis   white blood cell count stable at 10.3 on January 4  This has trended down from 11.8  Continue to monitor.  T-max in 24 hours at the [facility] hospital of 37.1 degrees     Asthma  Symbicort twice daily     Bowel and bladder  History of urinary incontinence   bladder scan volumes ranging from   Continue to monitor     Colace 100 mg twice a day  Katarzyna-Colace  nightly  Milk of magnesia as needed  Bisacodyl suppository as needed     Vitamin D deficiency  Vitamin D was 6 on admission, so we began supplementation with 4000 units of cholecalciferol daily  Continue calcium supplementation with Tums     DVT prophylaxis  Lovenox 40 mg qhs     Estimated discharge: 10-14 days    Total time:  29 minutes.  I spent greater than 50% of the time for patient care, counseling, and coordination on this date, including unit/floor time, and face-to-face time with the patient as per interval events and assessment and plan above. Topics discussed included initiation of therapy, pain control, medication management, vitamin D level, calcium and vitamin D supplementation, admission labs      Jace Bajwa M.D.  1/4/2019

## 2019-01-04 NOTE — PROGRESS NOTES
Patient care assumed. Report received from day RN. Patient is alert and calm, resting in bed. Call light and bedside table within reach. Will continue to monitor.

## 2019-01-04 NOTE — CONSULTS
"DATE OF SERVICE:  1/4/2018    REQUESTING PHYSICIAN:  MD Jace Rivera MD, MD    CHIEF COMPLAINT / REASON FOR CONSULTATION:   Hypertension  Diabetes  Pedal edema    HISTORY OF PRESENT ILLNESS:  This is a 74 y/o female with a PMH significant for hypertension, diabetes, obesity with BMI of 42.9, low back pain and multiple lower back surgeries who was admitted on December 28, 2018 with neurogenic claudication with recurrent lumbar stenosis at L3-L5.  She has a history of cervical decompression and fusion in 2015, lumbar discectomy in 2016, and lumbar laminectomy in 2016.  She did undergo another back surgery with L3 nerve root decompression, L4 redo laminectomy, L5 laminectomy, arthrodesis from L3-L5 and instrumented fusion from L3-L5 by Dr. Sheldon on December 28, 2018.    Because of the patient's weakness and debility, Rehab was consulted, evaluated the patient, and was deemed a good Rehab candidate.  The patient was transferred over to the Rehab facility on 1/3/2018.      The patient denies fever, chills, nausea, vomiting, headaches, blurry vision, or chest pain.    Because of these issues, Internal Medicine was consulted to help evaluate and manage the patient.    REVIEW OF SYSTEMS: All review of systems are negative pre AMA and CMS criteria   except for that stated in the HPI.    PAST MEDICAL HISTORY:  Past Medical History:   Diagnosis Date   • Anesthesia 2016    \"took a long time to wake up, ended up on a vent after neck surg\"     • Arthritis     fingers, back   • Asthma     uses inhaler   • Breath shortness     with exertion   • Bronchitis 1998   • Congestive heart failure (HCC)    • Diabetes (HCC)     oral and insulin   • Encounter for renal dialysis 07/2014    r/t ARF, denies at this time   • Heart murmur    • Heart valve disease    • High cholesterol    • Hypertension    • Hypothyroid    • Low back pain     back and leg 8/10 at worst    • Numbness and tingling in hands     " and arms   • Pain 3/4/16    3/10 lower back   • Renal disorder     AFR in 7-2014, resolved   • Snoring    • Unspecified cataract     removed bilat   • Unspecified urinary incontinence     wears pad   • Urinary bladder disorder        PAST SURGICAL HISTORY:  Past Surgical History:   Procedure Laterality Date   • LUMBAR FUSION POSTERIOR  12/28/2018    Procedure: LUMBAR FUSION POSTERIOR- L3-5 REDO;  Surgeon: Avel Sheldon M.D.;  Location: SURGERY [facility];  Service: Neurosurgery   • LUMBAR DECOMPRESSION  12/28/2018    Procedure: LUMBAR DECOMPRESSION;  Surgeon: Avel Sheldon M.D.;  Location: [facility];  Service: Neurosurgery   • CERVICAL FUSION POSTERIOR  10/13/2016    Procedure: CERVICAL FUSION POSTERIOR - EXTENSION TO C3-4 W/PLACEMENT OF LATERAL MASS SCREWS AT C3;  Surgeon: Alvaro Chaudhry M.D.;  Location: [facility];  Service:    • CERVICAL DECOMPRESSION POSTERIOR  10/13/2016    Procedure: CERVICAL DECOMPRESSION POSTERIOR - RE=DO C3-4;  Surgeon: Alvaro Chaudhry M.D.;  Location: [facility];  Service:    • LUMBAR LAMINECTOMY DISKECTOMY  10/13/2016    Procedure: LUMBAR LAMINECTOMY DISKECTOMY - L4-5;  Surgeon: Alvaro Chaudhry M.D.;  Location: [facility];  Service:    • IRRIGATION & DEBRIDEMENT NEURO  3/10/2016    Procedure: IRRIGATION & DEBRIDEMENT NEURO AND WOUND VAC PLACEMENT;  Surgeon: Alvaro Chaudhry M.D.;  Location: [facility];  Service:    • LUMBAR LAMINECTOMY DISKECTOMY  1/22/2016    Procedure: LUMBAR LAMINECTOMY DISKECTOMY L1-5;  Surgeon: Alvaro Chaudhry M.D.;  Location: [facility];  Service:    • CERVICAL FUSION POSTERIOR  9/16/2015    Procedure: CERVICAL FUSION POSTERIOR C3-T1 LATERAL MASS SCREWS;  Surgeon: Alvaro Chaudhry M.D.;  Location: SURGERY [facility];  Service:    • CERVICAL LAMINECTOMY POSTERIOR  9/16/2015    Procedure: CERVICAL LAMINECTOMY POSTERIOR C3-T1;  Surgeon: Alvaro Chaudhry M.D.;  [facility]  "[facility];  Service:    • CATARACT EXTRACTION WITH IOL Bilateral    • GYN SURGERY      hysterectomy    • OTHER ABDOMINAL SURGERY      appendix removed   • TONSILLECTOMY         Allergies   Allergen Reactions   • Codeine Itching     \"My head itches.\"   • Other Environmental Rash     Requires Hypoallergenic Linen   • Tape Rash     Paper Tape ok       CURRENT MEDICATIONS:    Current Facility-Administered Medications:   •  vitamin D  •  acetaminophen  •  Respiratory Care per Protocol  •  Pharmacy Consult Request  •  hydrALAZINE  •  artificial tears  •  benzocaine-menthol  •  mag hydrox-al hydrox-simeth  •  ondansetron **OR** ondansetron  •  traZODone  •  sodium chloride  •  oxyCODONE immediate-release  •  oxyCODONE immediate release  •  enoxaparin  •  bisacodyl  •  calcium carbonate  •  docusate sodium  •  magnesium hydroxide  •  senna-docusate  •  tizanidine  •  amitriptyline  •  amLODIPine  •  atorvastatin  •  budesonide-formoterol  •  gabapentin  •  insulin glargine  •  metFORMIN  •  metoprolol SR  •  SITagliptin  •  levothyroxine **AND** levothyroxine  •  gabapentin    Social History     Social History   • Marital status:      Spouse name: N/A   • Number of children: N/A   • Years of education: N/A     Social History Main Topics   • Smoking status: Never Smoker   • Smokeless tobacco: Never Used   • Alcohol use No   • Drug use: No   • Sexual activity: Not on file     Other Topics Concern   • Not on file     Social History Narrative   • No narrative on file       FAMILY HISTORY:  was reviewed and is not pertinent to this consultation.    PHYSICAL EXAMINATION:  VITAL SIGNS:  Temp is 98.7, blood pressure is 138/76, heart rate is 65, respiratory rate is 18*.  GENERAL:  Patient was lying in bed in no distress.  HEENT:  Pupils were equal, round and reactive to light and accomodation.  Oral mucosa was pink and moist.  NECK:  Soft.  Supple.  No JVD.  HEART:  Regular rate and rhythm.  Normal S1 and S2.  No murmurs were " appreciated.  LUNGS:  Are clear to auscultation bilaterally.  ABDOMEN:  Soft, non tender, non distended.  Bowels sound were positive in all four quadrants.  EXTREMITIES:  No clubbing, cyanosis.  There was noted mild pedall swelling.  NEUROLOGIC:  Cranial nerves two through twelve were grossly intact.    LABS:  Lab Results   Component Value Date/Time    SODIUM 141 01/04/2019 05:51 AM    POTASSIUM 3.8 01/04/2019 05:51 AM    CHLORIDE 104 01/04/2019 05:51 AM    CO2 28 01/04/2019 05:51 AM    GLUCOSE 118 (H) 01/04/2019 05:51 AM    BUN 17 01/04/2019 05:51 AM    CREATININE 0.90 01/04/2019 05:51 AM      Lab Results   Component Value Date/Time    WBC 10.3 01/04/2019 05:51 AM    RBC 3.72 (L) 01/04/2019 05:51 AM    HEMOGLOBIN 11.4 (L) 01/04/2019 05:51 AM    HEMATOCRIT 34.5 (L) 01/04/2019 05:51 AM    MCV 92.7 01/04/2019 05:51 AM    MCH 30.6 01/04/2019 05:51 AM    MCHC 33.0 (L) 01/04/2019 05:51 AM    MPV 10.6 01/04/2019 05:51 AM    NEUTSPOLYS 63.70 01/04/2019 05:51 AM    LYMPHOCYTES 20.90 (L) 01/04/2019 05:51 AM    MONOCYTES 11.40 01/04/2019 05:51 AM    EOSINOPHILS 2.40 01/04/2019 05:51 AM    BASOPHILS 1.10 01/04/2019 05:51 AM    ANISOCYTOSIS 1+ 09/18/2015 04:00 AM      Lab Results   Component Value Date/Time    PROTHROMBTM 13.7 10/27/2016 05:27 PM    INR 1.02 10/27/2016 05:27 PM      Type 2 diabetes mellitus with hyperglycemia (HCC)  Hba1c: 6.8 (1/4)  BS a little labile:   On Lantus: 30 units qhs  On Metformin 500 mg bid  On Januvia: 100 mg daily  Cont to monitor    Hypothyroid  TSH (1/4): ok  On Synthroid    Vitamin D deficiency  Vit D: 6  On high dose supplements    HTN (hypertension)  BP ok  On Torpol XL: 200 mg bid  On Norvasc: 2.5 mg daily  Cont to monitor    Pedal edema  Has mild B/L pedal swelling  Has hx and is at baseline      This case has been discussed with the attending Physiatrist.    Thank you for the consultation.  Will follow the patient with you.

## 2019-01-04 NOTE — THERAPY
Occupational Therapy Initial Plan of Care Note    1) Assessment:  Patient is 73 y.o. female with a diagnosis of lumbar spinal stenosis, s/p L3 nerve root decompression, L4-5 lami, arthrodesis from L3-5, fusion L3-5.  Patient lives with a friend in a 2 story house with 2 EMMY and was independent with all adls, iadls, transfers and mobility (other than requiring assist with grocery shopping).  Additional factors influencing patient status / progress (ie: cognitive factors, co-morbidities, social support, etc): DM, obesity, HTN, multiple back surgeries in past, back pain, has many friends that can assist patient.  Long term and short term goals have been discussed with patient and they are in agreement.  2) Plan:  Recommend Occupational Therapy  minutes per day 5-7 days per week for 10-14 days for the following treatments:  OT Group Therapy, OT Self Care/ADL, OT Community Reintegration, OT Manual Ther Technique, OT Neuro Re-Ed/Balance, OT Sensory Int Techniques, OT Therapeutic Activity, OT Evaluation and OT Therapeutic Exercise.  3) Goals:  Please refer to care plan for goals.

## 2019-01-04 NOTE — REHAB-PHARMACY IDT TEAM NOTE
Pharmacy   Pharmacy  Antibiotics/Antifungals/Antivirals:  Medication:      Active Orders     None        Route:        NA  Stop Date:  NA  Reason:      NA  Antihypertensives/Cardiac:  Medication:    Orders (72h ago through future)    Start     Ordered    01/03/19 2100  amLODIPine (NORVASC) tablet 2.5 mg  EVERY BEDTIME      01/03/19 1114    01/03/19 2100  atorvastatin (LIPITOR) tablet 20 mg  EVERY BEDTIME      01/03/19 1114    01/03/19 2100  metoprolol SR (TOPROL XL) tablet 200 mg  2 TIMES DAILY      01/03/19 1114    01/03/19 1114  hydrALAZINE (APRESOLINE) tablet 25 mg  EVERY 8 HOURS PRN      01/03/19 1114        Patient Vitals for the past 24 hrs:   BP Pulse   01/04/19 0630 138/76 65       Anticoagulation:  Medication: Lovenox    Other key medications: A review of the medication list reveals no issues at this time. Patient is currently on antihypertensive(s). Recommend home blood pressure monitoring/recording if antihypertensive(s) regimen(s) continue. Patient is currently on diabetic medication(s) and/or Insulin(s). Recommend home blood glucose monitoring/recording if these regimen(s) continue.    Section completed by: Carrington Sosa [facility]

## 2019-01-04 NOTE — CARE PLAN
Problem: Safety  Goal: Will remain free from injury  Outcome: PROGRESSING AS EXPECTED  Patient uses call light consistently and appropriately this shift.  Waits for assistance when needed and does not attempt self transfer.  Able to verbalize needs.     Problem: Pain Management  Goal: Pain level will decrease to patient's comfort goal  Outcome: PROGRESSING AS EXPECTED  Patient able to verbalize needs.  Denies pain or discomfort this shift and no s/s same noted.

## 2019-01-04 NOTE — DISCHARGE PLANNING
CASE MANAGEMENT INITIAL ASSESSMENT    Admit Date:  1/3/2019   .   Patient is a  73 y.o. female transferred from [facility].  She is currently in therapy session.  I have reviewed the medical records and will follow to confirm demographic information.  Patient's admitting physician for rehab is Dr. Bajwa.    Diagnosis: 04.130 Other Non-Traumatic Spinal Cord Dysfunction  Lumbar stenosis with neurogenic claudication    Co-morbidities:   Patient Active Problem List    Diagnosis Date Noted   • Cervical myelopathy ([facility]) 09/23/2015     Priority: High     Class: Acute   • Vitamin D deficiency 01/04/2019   • Pain 01/03/2019   • Mixed hyperlipidemia 01/03/2019   • Anemia 01/03/2019   • Debility 11/10/2016   • Spinal stenosis of lumbar region with neurogenic claudication 11/07/2016   • Postoperative CSF leak 11/07/2016   • Stenosis, spinal, lumbar 10/19/2016   • Cervical stenosis of spine 10/19/2016   • Dysesthesia 10/19/2016   • Dehiscence of incision 04/05/2016   • Asthma 01/28/2016   • Impaired mobility and ADLs 01/27/2016   • Intervertebral disc disorder with radiculopathy of lumbosacral region 01/22/2016   • Spinal stenosis in cervical region 09/16/2015   • Acute respiratory failure with hypercapnia ([facility]) 09/16/2015   • Congestive heart failure (HCC) 09/16/2015   • Type 2 diabetes mellitus with hyperglycemia ([facility]) 09/16/2015   • Hypothyroid 09/16/2015   • HTN (hypertension) 09/16/2015   • Hypothyroid 09/16/2015   • Chronic kidney disease, stage II (mild) 07/09/2015   • Renal cyst, native, hemorrhage 07/09/2015   • Renal cysts, acquired, bilateral 07/09/2015   • HTN (hypertension) 07/09/2015   • Low back pain 04/21/2015   • Acute renal failure (HCC) 07/19/2014     Prior Living Situation:  Housing / Facility: 2 Story House  Lives with - Patient's Self Care Capacity: Unrelated Adult    Prior Level of Function:  Medication Management: Independent  Finances: Independent  Home Management: Independent  Shopping: Requires  Assist  Prior Level Of Mobility: Independent With Device in Community, Independent With Device in Home  Driving / Transportation: Driving Independent    Support Systems:  Primary : Son is Rene Chaparro at [phone] or [phone]  Other support systems: friend is Bella Griffith at [phone] or [phone]     Previous Services Utilized:   Equipment Owned: Front-Wheel Walker, 4-Wheel Walker, Single Point Cane, Tub / Shower Seat, Grab Bar(s) In Tub / Shower  Prior Services: Home-Independent    Other Information:  Occupation (Pre-Hospital Vocational): Retired Due To Age  Primary Payor Source: Medicare A, Medicare B  Secondary Payor Source: Supplemental Insurance  Primary Care Practitioner : Dr. Timothy Lombard  Other MDs: Dr. Sheldon for neurosurgery    Additional Case Management Questions:  Have you ever received case management services for yourself or a family member?  Unable to determine    Do you feel you have and an understanding of what services  provide? Unable to determine    Do you have any additional questions regarding case management?  Unable to determine         CASE MANAGEMENT PLAN OF CARE   Individualized Goals:   1. I will follow to confirm available sources for follow up therapy in patient's area.  2. I will provide update from weekly team conference discussion.  3. Case management will follow for status of oxygen weaning.    Barriers:   1. None at this time    Patient / Family Goal:  Patient / Family Goal: Patient lives in a 2 story home with a friend.  She stays on the lower level.  She has required oxygen during hospitalization but does not normally use this at home.  I will monitor the weaning of this.  She may need home health and has supportive friend and son.      Plan:  1. Continue to follow patient through hospitalization and provide discharge planning in collaboration with patient, family, physicians and ancillary services.     2. Utilize community  resources to ensure a safe discharge.

## 2019-01-04 NOTE — THERAPY
Physical Therapy Initial Plan of Care Note    1) Assessment:Pt is a 74 y/o female admitted s/p L3 nerve root decompression, L4 redo laminectomy, L5 laminectomy, arthrodesis from L3-L5 and instrumented fusion from L3-L5 by Dr. Sheldon on December 28, 2018 for spinal stenosis of lumbar region with neurogenic claudication.  .  Additional factors influencing patient status / progress (ie: cognitive factors, co-morbidities, social support, etc): Pt presents to PT with impaired knowledge of precautions in regards to functional mobility, impaired standing balance, L LE pain with functional mobility, fear avoidance, maladaptive pain beliefs and impaired B LE strength with L LE having greater impaired which inhibits the pt's ability to perform functional mobility safely. Pt would benefit from skilled PT services to address this concerns prior to d/c to prior living situation with friend in [place]. Pt has goal of return to performing grocery shopping..  Long term and short term goals have been discussed with patient and they are in agreement.  2) Plan: Recommend Physical Therapy  minutes per day 5-7 days per week for 10-14 days for the following treatments:  PT Group Therapy, PT E Stim Attended, PT Gait Training, PT Therapeutic Exercises, PT Neuro Re-Ed/Balance, PT Therapeutic Activity, PT Manual Therapy and PT Evaluation.  3) Goals:  Please refer to care plan for goals.

## 2019-01-05 LAB
GLUCOSE BLD-MCNC: 100 MG/DL (ref 65–99)
GLUCOSE BLD-MCNC: 102 MG/DL (ref 65–99)
GLUCOSE BLD-MCNC: 159 MG/DL (ref 65–99)
GLUCOSE BLD-MCNC: 164 MG/DL (ref 65–99)

## 2019-01-05 PROCEDURE — 97530 THERAPEUTIC ACTIVITIES: CPT

## 2019-01-05 PROCEDURE — A9270 NON-COVERED ITEM OR SERVICE: HCPCS | Performed by: PHYSICAL MEDICINE & REHABILITATION

## 2019-01-05 PROCEDURE — 97116 GAIT TRAINING THERAPY: CPT

## 2019-01-05 PROCEDURE — 99232 SBSQ HOSP IP/OBS MODERATE 35: CPT | Performed by: HOSPITALIST

## 2019-01-05 PROCEDURE — 97110 THERAPEUTIC EXERCISES: CPT

## 2019-01-05 PROCEDURE — 97112 NEUROMUSCULAR REEDUCATION: CPT

## 2019-01-05 PROCEDURE — 700112 HCHG RX REV CODE 229: Performed by: PHYSICAL MEDICINE & REHABILITATION

## 2019-01-05 PROCEDURE — 700102 HCHG RX REV CODE 250 W/ 637 OVERRIDE(OP): Performed by: PHYSICAL MEDICINE & REHABILITATION

## 2019-01-05 PROCEDURE — 770010 HCHG ROOM/CARE - REHAB SEMI PRIVAT*

## 2019-01-05 PROCEDURE — 700111 HCHG RX REV CODE 636 W/ 250 OVERRIDE (IP): Performed by: PHYSICAL MEDICINE & REHABILITATION

## 2019-01-05 PROCEDURE — 82962 GLUCOSE BLOOD TEST: CPT | Mod: 91

## 2019-01-05 RX ADMIN — LEVOTHYROXINE SODIUM 50 MCG: 50 TABLET ORAL at 06:18

## 2019-01-05 RX ADMIN — CALCIUM CARBONATE (ANTACID) CHEW TAB 500 MG 500 MG: 500 CHEW TAB at 08:17

## 2019-01-05 RX ADMIN — OXYCODONE HYDROCHLORIDE 5 MG: 5 TABLET ORAL at 08:17

## 2019-01-05 RX ADMIN — BUDESONIDE AND FORMOTEROL FUMARATE DIHYDRATE 2 PUFF: 80; 4.5 AEROSOL RESPIRATORY (INHALATION) at 08:16

## 2019-01-05 RX ADMIN — SITAGLIPTIN 100 MG: 50 TABLET, FILM COATED ORAL at 08:17

## 2019-01-05 RX ADMIN — DOCUSATE SODIUM 100 MG: 100 CAPSULE, LIQUID FILLED ORAL at 08:17

## 2019-01-05 RX ADMIN — DOCUSATE SODIUM 100 MG: 100 CAPSULE, LIQUID FILLED ORAL at 20:48

## 2019-01-05 RX ADMIN — ENOXAPARIN SODIUM 40 MG: 100 INJECTION SUBCUTANEOUS at 20:50

## 2019-01-05 RX ADMIN — LEVOTHYROXINE SODIUM 125 MCG: 125 TABLET ORAL at 06:17

## 2019-01-05 RX ADMIN — METFORMIN HYDROCHLORIDE 500 MG: 500 TABLET, FILM COATED ORAL at 08:17

## 2019-01-05 RX ADMIN — ATORVASTATIN CALCIUM 20 MG: 10 TABLET, FILM COATED ORAL at 20:47

## 2019-01-05 RX ADMIN — METOPROLOL SUCCINATE 200 MG: 100 TABLET, EXTENDED RELEASE ORAL at 20:47

## 2019-01-05 RX ADMIN — ACETAMINOPHEN 975 MG: 325 TABLET, FILM COATED ORAL at 06:18

## 2019-01-05 RX ADMIN — METOPROLOL SUCCINATE 200 MG: 100 TABLET, EXTENDED RELEASE ORAL at 08:16

## 2019-01-05 RX ADMIN — INSULIN GLARGINE 30 UNITS: 100 INJECTION, SOLUTION SUBCUTANEOUS at 20:50

## 2019-01-05 RX ADMIN — AMLODIPINE BESYLATE 2.5 MG: 5 TABLET ORAL at 20:46

## 2019-01-05 RX ADMIN — GABAPENTIN 600 MG: 300 CAPSULE ORAL at 20:48

## 2019-01-05 RX ADMIN — ACETAMINOPHEN 975 MG: 325 TABLET, FILM COATED ORAL at 14:37

## 2019-01-05 RX ADMIN — CALCIUM CARBONATE (ANTACID) CHEW TAB 500 MG 500 MG: 500 CHEW TAB at 20:45

## 2019-01-05 RX ADMIN — BUDESONIDE AND FORMOTEROL FUMARATE DIHYDRATE 2 PUFF: 80; 4.5 AEROSOL RESPIRATORY (INHALATION) at 20:50

## 2019-01-05 RX ADMIN — METFORMIN HYDROCHLORIDE 500 MG: 500 TABLET, FILM COATED ORAL at 17:44

## 2019-01-05 RX ADMIN — VITAMIN D, TAB 1000IU (100/BT) 4000 UNITS: 25 TAB at 08:16

## 2019-01-05 RX ADMIN — GABAPENTIN 300 MG: 300 CAPSULE ORAL at 14:37

## 2019-01-05 RX ADMIN — SENNOSIDES AND DOCUSATE SODIUM 1 TABLET: 8.6; 5 TABLET ORAL at 21:00

## 2019-01-05 RX ADMIN — AMITRIPTYLINE HYDROCHLORIDE 20 MG: 10 TABLET, FILM COATED ORAL at 20:47

## 2019-01-05 RX ADMIN — GABAPENTIN 300 MG: 300 CAPSULE ORAL at 08:17

## 2019-01-05 RX ADMIN — ACETAMINOPHEN 975 MG: 325 TABLET, FILM COATED ORAL at 20:46

## 2019-01-05 ASSESSMENT — ENCOUNTER SYMPTOMS
NAUSEA: 0
PALPITATIONS: 0
SHORTNESS OF BREATH: 0
FEVER: 0
DIZZINESS: 0
BLURRED VISION: 0
VOMITING: 0
HALLUCINATIONS: 0
HEADACHES: 0

## 2019-01-05 ASSESSMENT — PAIN SCALES - GENERAL
PAINLEVEL_OUTOF10: 7
PAINLEVEL_OUTOF10: 4

## 2019-01-05 NOTE — CARE PLAN
Problem: Infection  Goal: Will remain free from infection  Outcome: PROGRESSING AS EXPECTED  Patient does not present signs and symptoms of infection such as fever, inflammation, purulent drainage, change in LOC, turbid urine or loose stools. Her back incision is well-approximated, closed with staples and no signs of redness or drainage.  Will continue to monitor.    Problem: Venous Thromboembolism (VTW)/Deep Vein Thrombosis (DVT) Prevention:  Goal: Patient will participate in Venous Thrombosis (VTE)/Deep Vein Thrombosis (DVT)Prevention Measures  Outcome: PROGRESSING AS EXPECTED   01/04/19 2025   OTHER   Risk Assessment Score 2   VTE RISK Moderate   Pharmacologic Prophylaxis Used LMWH: Enoxaparin(Lovenox)   VTE risk assessment completed (see flowsheets). Patient is at minimal/moderate/high risk for the development of VTEs. Patient is compliant with prophylactic treatment such as anticoagulants. Patient also actively participates in therapies and goes to the dinning room for meals.

## 2019-01-05 NOTE — CARE PLAN
Problem: Infection  Goal: Will remain free from infection  Outcome: PROGRESSING AS EXPECTED  Patient remains free from s/s infection; afebrile.Patient's skin remains intact and free from new or accidental injury this shift.  Will continue to monitor.    Problem: Pain Management  Goal: Pain level will decrease to patient's comfort goal  Outcome: PROGRESSING AS EXPECTED  Pt able to participate in therapies and activities this shift.Patient able to verbalize needs.  Denies pain or discomfort this shift and no s/s same noted.  Will continue to monitor.

## 2019-01-05 NOTE — PROGRESS NOTES
Received shift report and assumed care of patient.  Patient awake, calm and stable, currently positioned in bed for comfort and safety; call light within reach.  6/10 pain and discomfort at this time. See mar for medication. Will continue to monitor.

## 2019-01-05 NOTE — PROGRESS NOTES
[facility] Medicine Daily Progress Note    Date of Service  1/5/2019    Chief Complaint:  Hypertension  Diabetes  Pedal edema    Interval History:  No significant events or changes since last visit  Patient denies SOB, cough, or diarrhea  Patient slept ok last night      Review of Systems  Review of Systems   Constitutional: Negative for fever.   Eyes: Negative for blurred vision.   Respiratory: Negative for shortness of breath.    Cardiovascular: Negative for palpitations.   Gastrointestinal: Negative for nausea and vomiting.   Neurological: Negative for dizziness and headaches.   Psychiatric/Behavioral: Negative for hallucinations.        Physical Exam  Temp:  [36.8 °C (98.2 °F)-36.8 °C (98.3 °F)] 36.8 °C (98.2 °F)  Pulse:  [59-71] 59  Resp:  [17-18] 17  BP: (108-129)/(51-74) 125/51    Physical Exam   Constitutional: She is oriented to person, place, and time.   HENT:   Mouth/Throat: Oropharynx is clear and moist.   Eyes: No scleral icterus.   Neck: Normal range of motion.   Cardiovascular: Normal rate, regular rhythm, S1 normal and S2 normal.    Pulmonary/Chest: Effort normal. No stridor. She has no wheezes. She has no rales.   Abdominal: Soft. Bowel sounds are normal. Hernia confirmed negative in the right inguinal area and confirmed negative in the left inguinal area.   Neurological: She is alert and oriented to person, place, and time. No sensory deficit.   Skin: Skin is warm and dry. She is not diaphoretic. No cyanosis.   Psychiatric: She has a normal mood and affect. Her behavior is normal.   Nursing note and vitals reviewed.      Fluids    Intake/Output Summary (Last 24 hours) at 01/05/19 1010  Last data filed at 01/05/19 0900   Gross per 24 hour   Intake             1140 ml   Output                0 ml   Net             1140 ml       Laboratory  Recent Labs      01/04/19   0551   WBC  10.3   RBC  3.72*   HEMOGLOBIN  11.4*   HEMATOCRIT  34.5*   MCV  92.7   MCH  30.6   MCHC  33.0*   RDW  43.3   PLATELETCT  330    MPV  10.6     Recent Labs      01/04/19   0551   SODIUM  141   POTASSIUM  3.8   CHLORIDE  104   CO2  28   GLUCOSE  118*   BUN  17   CREATININE  0.90   CALCIUM  9.5             Recent Labs      01/04/19   0551   TRIGLYCERIDE  193*   HDL  35*   LDL  52       Imaging    Assessment/Plan  Pedal edema   Assessment & Plan    Has mild B/L pedal swelling  Has hx and is at baseline     Vitamin D deficiency   Assessment & Plan    Vit D: 6  On high dose supplements     Hypothyroid- (present on admission)   Assessment & Plan    TSH (1/4): ok  On Synthroid     Type 2 diabetes mellitus with hyperglycemia (HCC)- (present on admission)   Assessment & Plan    Hba1c: 6.8 (1/4)  BS a little labile: 102-185  On Lantus: 30 units qhs  On Metformin 500 mg bid  On Januvia: 100 mg daily  Cont to monitor     HTN (hypertension)- (present on admission)   Assessment & Plan    BP ok  On Torpol XL: 200 mg bid  On Norvasc: 2.5 mg daily  Cont to monitor

## 2019-01-06 LAB
GLUCOSE BLD-MCNC: 107 MG/DL (ref 65–99)
GLUCOSE BLD-MCNC: 116 MG/DL (ref 65–99)
GLUCOSE BLD-MCNC: 131 MG/DL (ref 65–99)
GLUCOSE BLD-MCNC: 177 MG/DL (ref 65–99)

## 2019-01-06 PROCEDURE — 97530 THERAPEUTIC ACTIVITIES: CPT

## 2019-01-06 PROCEDURE — 770010 HCHG ROOM/CARE - REHAB SEMI PRIVAT*

## 2019-01-06 PROCEDURE — A9270 NON-COVERED ITEM OR SERVICE: HCPCS | Performed by: PHYSICAL MEDICINE & REHABILITATION

## 2019-01-06 PROCEDURE — 82962 GLUCOSE BLOOD TEST: CPT | Mod: 91

## 2019-01-06 PROCEDURE — 97112 NEUROMUSCULAR REEDUCATION: CPT

## 2019-01-06 PROCEDURE — 97110 THERAPEUTIC EXERCISES: CPT

## 2019-01-06 PROCEDURE — 99232 SBSQ HOSP IP/OBS MODERATE 35: CPT | Performed by: HOSPITALIST

## 2019-01-06 PROCEDURE — 97535 SELF CARE MNGMENT TRAINING: CPT

## 2019-01-06 PROCEDURE — 700102 HCHG RX REV CODE 250 W/ 637 OVERRIDE(OP): Performed by: PHYSICAL MEDICINE & REHABILITATION

## 2019-01-06 PROCEDURE — 700111 HCHG RX REV CODE 636 W/ 250 OVERRIDE (IP): Performed by: PHYSICAL MEDICINE & REHABILITATION

## 2019-01-06 PROCEDURE — 97116 GAIT TRAINING THERAPY: CPT

## 2019-01-06 PROCEDURE — 700112 HCHG RX REV CODE 229: Performed by: PHYSICAL MEDICINE & REHABILITATION

## 2019-01-06 RX ADMIN — METFORMIN HYDROCHLORIDE 500 MG: 500 TABLET, FILM COATED ORAL at 17:32

## 2019-01-06 RX ADMIN — BUDESONIDE AND FORMOTEROL FUMARATE DIHYDRATE 2 PUFF: 80; 4.5 AEROSOL RESPIRATORY (INHALATION) at 08:30

## 2019-01-06 RX ADMIN — CALCIUM CARBONATE (ANTACID) CHEW TAB 500 MG 500 MG: 500 CHEW TAB at 08:26

## 2019-01-06 RX ADMIN — METOPROLOL SUCCINATE 200 MG: 100 TABLET, EXTENDED RELEASE ORAL at 20:14

## 2019-01-06 RX ADMIN — DOCUSATE SODIUM 100 MG: 100 CAPSULE, LIQUID FILLED ORAL at 08:27

## 2019-01-06 RX ADMIN — MAGNESIUM HYDROXIDE 30 ML: 400 SUSPENSION ORAL at 04:55

## 2019-01-06 RX ADMIN — ATORVASTATIN CALCIUM 20 MG: 10 TABLET, FILM COATED ORAL at 20:14

## 2019-01-06 RX ADMIN — LEVOTHYROXINE SODIUM 50 MCG: 50 TABLET ORAL at 04:55

## 2019-01-06 RX ADMIN — GABAPENTIN 300 MG: 300 CAPSULE ORAL at 08:28

## 2019-01-06 RX ADMIN — ACETAMINOPHEN 975 MG: 325 TABLET, FILM COATED ORAL at 20:14

## 2019-01-06 RX ADMIN — ACETAMINOPHEN 975 MG: 325 TABLET, FILM COATED ORAL at 04:55

## 2019-01-06 RX ADMIN — AMITRIPTYLINE HYDROCHLORIDE 20 MG: 10 TABLET, FILM COATED ORAL at 20:14

## 2019-01-06 RX ADMIN — ACETAMINOPHEN 975 MG: 325 TABLET, FILM COATED ORAL at 14:47

## 2019-01-06 RX ADMIN — AMLODIPINE BESYLATE 2.5 MG: 5 TABLET ORAL at 20:14

## 2019-01-06 RX ADMIN — METFORMIN HYDROCHLORIDE 500 MG: 500 TABLET, FILM COATED ORAL at 08:27

## 2019-01-06 RX ADMIN — VITAMIN D, TAB 1000IU (100/BT) 4000 UNITS: 25 TAB at 08:27

## 2019-01-06 RX ADMIN — BUDESONIDE AND FORMOTEROL FUMARATE DIHYDRATE 2 PUFF: 80; 4.5 AEROSOL RESPIRATORY (INHALATION) at 20:12

## 2019-01-06 RX ADMIN — INSULIN GLARGINE 30 UNITS: 100 INJECTION, SOLUTION SUBCUTANEOUS at 20:13

## 2019-01-06 RX ADMIN — SITAGLIPTIN 100 MG: 50 TABLET, FILM COATED ORAL at 08:27

## 2019-01-06 RX ADMIN — LEVOTHYROXINE SODIUM 125 MCG: 125 TABLET ORAL at 04:55

## 2019-01-06 RX ADMIN — GABAPENTIN 600 MG: 300 CAPSULE ORAL at 20:15

## 2019-01-06 RX ADMIN — METOPROLOL SUCCINATE 200 MG: 100 TABLET, EXTENDED RELEASE ORAL at 08:27

## 2019-01-06 RX ADMIN — GABAPENTIN 300 MG: 300 CAPSULE ORAL at 14:47

## 2019-01-06 RX ADMIN — ENOXAPARIN SODIUM 40 MG: 100 INJECTION SUBCUTANEOUS at 20:13

## 2019-01-06 RX ADMIN — CALCIUM CARBONATE (ANTACID) CHEW TAB 500 MG 500 MG: 500 CHEW TAB at 20:14

## 2019-01-06 ASSESSMENT — ENCOUNTER SYMPTOMS
DIZZINESS: 0
BLURRED VISION: 0
NERVOUS/ANXIOUS: 0
DIARRHEA: 0
COUGH: 0
FEVER: 0

## 2019-01-06 ASSESSMENT — PAIN SCALES - GENERAL
PAINLEVEL_OUTOF10: 4
PAINLEVEL_OUTOF10: ASSUMED PAIN PRESENT
PAINLEVEL_OUTOF10: ASSUMED PAIN PRESENT

## 2019-01-06 NOTE — PROGRESS NOTES
[facility] Medicine Daily Progress Note    Date of Service  1/6/2019    Chief Complaint:  Hypertension  Diabetes  Pedal edema    Interval History:  No significant events or changes since last visit  Patient denies SOB, cough, or diarrhea  Patient slept ok last night      Review of Systems  Review of Systems   Constitutional: Negative for fever.   Eyes: Negative for blurred vision.   Respiratory: Negative for cough.    Cardiovascular: Negative for chest pain.   Gastrointestinal: Negative for diarrhea.   Musculoskeletal: Negative for joint pain.   Neurological: Negative for dizziness.   Psychiatric/Behavioral: The patient is not nervous/anxious.         Physical Exam  Temp:  [36.8 °C (98.2 °F)-37 °C (98.6 °F)] 37 °C (98.6 °F)  Pulse:  [61-72] 72  Resp:  [17-20] 20  BP: (106-138)/(52-69) 138/64    Physical Exam   Constitutional: She is oriented to person, place, and time. No distress.   HENT:   Mouth/Throat: No oropharyngeal exudate.   Eyes: EOM are normal.   Neck: No JVD present. No tracheal deviation present.   Cardiovascular: Normal rate, regular rhythm, S1 normal and S2 normal.    Pulmonary/Chest: Effort normal. She has no wheezes. She has no rales.   Abdominal: Soft. She exhibits no distension. There is no tenderness.   Neurological: She is alert and oriented to person, place, and time. No sensory deficit.   Skin: Skin is warm and dry. No cyanosis.   Psychiatric: She has a normal mood and affect. Her behavior is normal.   Nursing note and vitals reviewed.      Fluids    Intake/Output Summary (Last 24 hours) at 01/06/19 0952  Last data filed at 01/05/19 1900   Gross per 24 hour   Intake              240 ml   Output                0 ml   Net              240 ml       Laboratory  Recent Labs      01/04/19   0551   WBC  10.3   RBC  3.72*   HEMOGLOBIN  11.4*   HEMATOCRIT  34.5*   MCV  92.7   MCH  30.6   MCHC  33.0*   RDW  43.3   PLATELETCT  330   MPV  10.6     Recent Labs      01/04/19   0551   SODIUM  141   POTASSIUM   3.8   CHLORIDE  104   CO2  28   GLUCOSE  118*   BUN  17   CREATININE  0.90   CALCIUM  9.5             Recent Labs      01/04/19   0551   TRIGLYCERIDE  193*   HDL  35*   LDL  52       Imaging    Assessment/Plan  * Spinal stenosis of lumbar region with neurogenic claudication- (present on admission)   Assessment & Plan    S/P surgery     Pedal edema   Assessment & Plan    Has mild B/L pedal swelling  Has hx and is at baseline     Vitamin D deficiency   Assessment & Plan    Vit D: 6  On high dose supplements     Hypothyroid- (present on admission)   Assessment & Plan    TSH (1/4): ok  On Synthroid     Type 2 diabetes mellitus with hyperglycemia (HCC)- (present on admission)   Assessment & Plan    Hba1c: 6.8 (1/4)  BS a little labile: 100-185  On Lantus: 30 units qhs  On Metformin 500 mg bid  On Januvia: 100 mg daily  Cont to monitor     HTN (hypertension)- (present on admission)   Assessment & Plan    BP ok  On Torpol XL: 200 mg bid  On Norvasc: 2.5 mg daily  Cont to monitor

## 2019-01-07 LAB
GLUCOSE BLD-MCNC: 118 MG/DL (ref 65–99)
GLUCOSE BLD-MCNC: 129 MG/DL (ref 65–99)
GLUCOSE BLD-MCNC: 159 MG/DL (ref 65–99)
GLUCOSE BLD-MCNC: 93 MG/DL (ref 65–99)

## 2019-01-07 PROCEDURE — A9270 NON-COVERED ITEM OR SERVICE: HCPCS | Performed by: PHYSICAL MEDICINE & REHABILITATION

## 2019-01-07 PROCEDURE — 97530 THERAPEUTIC ACTIVITIES: CPT

## 2019-01-07 PROCEDURE — 97116 GAIT TRAINING THERAPY: CPT

## 2019-01-07 PROCEDURE — 82962 GLUCOSE BLOOD TEST: CPT

## 2019-01-07 PROCEDURE — 99233 SBSQ HOSP IP/OBS HIGH 50: CPT | Performed by: PHYSICAL MEDICINE & REHABILITATION

## 2019-01-07 PROCEDURE — 99232 SBSQ HOSP IP/OBS MODERATE 35: CPT | Performed by: HOSPITALIST

## 2019-01-07 PROCEDURE — 97110 THERAPEUTIC EXERCISES: CPT

## 2019-01-07 PROCEDURE — 700111 HCHG RX REV CODE 636 W/ 250 OVERRIDE (IP): Performed by: PHYSICAL MEDICINE & REHABILITATION

## 2019-01-07 PROCEDURE — 770010 HCHG ROOM/CARE - REHAB SEMI PRIVAT*

## 2019-01-07 PROCEDURE — 97535 SELF CARE MNGMENT TRAINING: CPT

## 2019-01-07 PROCEDURE — 700112 HCHG RX REV CODE 229: Performed by: PHYSICAL MEDICINE & REHABILITATION

## 2019-01-07 PROCEDURE — 700102 HCHG RX REV CODE 250 W/ 637 OVERRIDE(OP): Performed by: PHYSICAL MEDICINE & REHABILITATION

## 2019-01-07 RX ORDER — BENZONATATE 100 MG/1
100 CAPSULE ORAL 3 TIMES DAILY PRN
Status: DISCONTINUED | OUTPATIENT
Start: 2019-01-07 | End: 2019-01-12 | Stop reason: HOSPADM

## 2019-01-07 RX ORDER — OXYCODONE HYDROCHLORIDE 5 MG/1
5 TABLET ORAL EVERY 4 HOURS PRN
Status: DISCONTINUED | OUTPATIENT
Start: 2019-01-07 | End: 2019-01-12 | Stop reason: HOSPADM

## 2019-01-07 RX ORDER — OXYCODONE HYDROCHLORIDE 5 MG/1
2.5 TABLET ORAL EVERY 4 HOURS PRN
Status: DISCONTINUED | OUTPATIENT
Start: 2019-01-07 | End: 2019-01-12 | Stop reason: HOSPADM

## 2019-01-07 RX ADMIN — ACETAMINOPHEN 975 MG: 325 TABLET, FILM COATED ORAL at 20:20

## 2019-01-07 RX ADMIN — BUDESONIDE AND FORMOTEROL FUMARATE DIHYDRATE 2 PUFF: 80; 4.5 AEROSOL RESPIRATORY (INHALATION) at 20:23

## 2019-01-07 RX ADMIN — BENZONATATE 100 MG: 100 CAPSULE ORAL at 13:03

## 2019-01-07 RX ADMIN — ACETAMINOPHEN 975 MG: 325 TABLET, FILM COATED ORAL at 04:39

## 2019-01-07 RX ADMIN — SITAGLIPTIN 100 MG: 50 TABLET, FILM COATED ORAL at 08:30

## 2019-01-07 RX ADMIN — INSULIN GLARGINE 30 UNITS: 100 INJECTION, SOLUTION SUBCUTANEOUS at 20:24

## 2019-01-07 RX ADMIN — CALCIUM CARBONATE (ANTACID) CHEW TAB 500 MG 500 MG: 500 CHEW TAB at 08:29

## 2019-01-07 RX ADMIN — SENNOSIDES AND DOCUSATE SODIUM 1 TABLET: 8.6; 5 TABLET ORAL at 20:21

## 2019-01-07 RX ADMIN — GABAPENTIN 600 MG: 300 CAPSULE ORAL at 20:20

## 2019-01-07 RX ADMIN — CALCIUM CARBONATE (ANTACID) CHEW TAB 500 MG 500 MG: 500 CHEW TAB at 20:24

## 2019-01-07 RX ADMIN — METOPROLOL SUCCINATE 200 MG: 100 TABLET, EXTENDED RELEASE ORAL at 08:30

## 2019-01-07 RX ADMIN — LEVOTHYROXINE SODIUM 50 MCG: 50 TABLET ORAL at 04:39

## 2019-01-07 RX ADMIN — GABAPENTIN 300 MG: 300 CAPSULE ORAL at 15:09

## 2019-01-07 RX ADMIN — ENOXAPARIN SODIUM 40 MG: 100 INJECTION SUBCUTANEOUS at 20:24

## 2019-01-07 RX ADMIN — LEVOTHYROXINE SODIUM 125 MCG: 125 TABLET ORAL at 04:40

## 2019-01-07 RX ADMIN — DOCUSATE SODIUM 100 MG: 100 CAPSULE, LIQUID FILLED ORAL at 20:21

## 2019-01-07 RX ADMIN — ACETAMINOPHEN 975 MG: 325 TABLET, FILM COATED ORAL at 13:03

## 2019-01-07 RX ADMIN — METFORMIN HYDROCHLORIDE 500 MG: 500 TABLET, FILM COATED ORAL at 08:29

## 2019-01-07 RX ADMIN — METFORMIN HYDROCHLORIDE 500 MG: 500 TABLET, FILM COATED ORAL at 17:15

## 2019-01-07 RX ADMIN — METOPROLOL SUCCINATE 200 MG: 100 TABLET, EXTENDED RELEASE ORAL at 20:21

## 2019-01-07 RX ADMIN — BUDESONIDE AND FORMOTEROL FUMARATE DIHYDRATE 2 PUFF: 80; 4.5 AEROSOL RESPIRATORY (INHALATION) at 08:33

## 2019-01-07 RX ADMIN — OXYCODONE HYDROCHLORIDE 5 MG: 5 TABLET ORAL at 04:39

## 2019-01-07 RX ADMIN — AMLODIPINE BESYLATE 2.5 MG: 5 TABLET ORAL at 20:23

## 2019-01-07 RX ADMIN — DOCUSATE SODIUM 100 MG: 100 CAPSULE, LIQUID FILLED ORAL at 08:29

## 2019-01-07 RX ADMIN — VITAMIN D, TAB 1000IU (100/BT) 4000 UNITS: 25 TAB at 08:29

## 2019-01-07 RX ADMIN — ATORVASTATIN CALCIUM 20 MG: 10 TABLET, FILM COATED ORAL at 20:21

## 2019-01-07 RX ADMIN — AMITRIPTYLINE HYDROCHLORIDE 20 MG: 10 TABLET, FILM COATED ORAL at 20:21

## 2019-01-07 RX ADMIN — GABAPENTIN 300 MG: 300 CAPSULE ORAL at 08:30

## 2019-01-07 ASSESSMENT — ENCOUNTER SYMPTOMS
CHILLS: 0
VOMITING: 0
FEVER: 0
DIARRHEA: 0
SHORTNESS OF BREATH: 0
ABDOMINAL PAIN: 0
NERVOUS/ANXIOUS: 0
NAUSEA: 0

## 2019-01-07 ASSESSMENT — PAIN SCALES - GENERAL
PAINLEVEL_OUTOF10: ASSUMED PAIN PRESENT
PAINLEVEL_OUTOF10: 2
PAINLEVEL_OUTOF10: ASSUMED PAIN PRESENT

## 2019-01-07 ASSESSMENT — PATIENT HEALTH QUESTIONNAIRE - PHQ9
2. FEELING DOWN, DEPRESSED, IRRITABLE, OR HOPELESS: NOT AT ALL
1. LITTLE INTEREST OR PLEASURE IN DOING THINGS: NOT AT ALL
SUM OF ALL RESPONSES TO PHQ9 QUESTIONS 1 AND 2: 0

## 2019-01-07 NOTE — CARE PLAN
Problem: Bathing  Goal: STG-Within one week, patient will bathe  1) Individualized Goal:  Body with setup and supervision using AE/AD/techniques as needed.  2) Interventions:  OT Group Therapy, OT Self Care/ADL, OT Community Reintegration, OT Manual Ther Technique, OT Neuro Re-Ed/Balance, OT Sensory Int Techniques, OT Therapeutic Activity, OT Evaluation and OT Therapeutic Exercise     Outcome: NOT MET  Min A    Problem: Dressing  Goal: STG-Within one week, patient will dress LB  1) Individualized Goal:  With min A using AE/AD/techniques as needed.  2) Interventions:  OT Group Therapy, OT Self Care/ADL, OT Community Reintegration, OT Manual Ther Technique, OT Neuro Re-Ed/Balance, OT Sensory Int Techniques, OT Therapeutic Activity, OT Evaluation and OT Therapeutic Exercise   Outcome: MET Date Met: 01/07/19  setup    Problem: Toileting  Goal: STG-Within one week, patient will complete toileting tasks  1) Individualized Goal:  With mod A using AE/AD/techniques as needed.  2) Interventions:  OT Group Therapy, OT Self Care/ADL, OT Community Reintegration, OT Manual Ther Technique, OT Neuro Re-Ed/Balance, OT Sensory Int Techniques, OT Therapeutic Activity, OT Evaluation and OT Therapeutic Exercise   Outcome: MET Date Met: 01/07/19  Mod I    Problem: Functional Transfers  Goal: STG-Within one week, patient will transfer to toilet  1) Individualized Goal:  With SBA using grab bar and commode over toilet as needed.  2) Interventions:  OT Group Therapy, OT Self Care/ADL, OT Community Reintegration, OT Manual Ther Technique, OT Neuro Re-Ed/Balance, OT Sensory Int Techniques, OT Therapeutic Activity, OT Evaluation and OT Therapeutic Exercise   Outcome: MET Date Met: 01/07/19  Mod I

## 2019-01-07 NOTE — REHAB-COLLABORATIVE ONGOING IDT TEAM NOTE
Weekly Interdisciplinary Team Conference Note    Weekly Interdisciplinary Team Conference # 1  Date:  1/7/2019    Clinicians present and reporting at team conference include the following:   MD: Jace Bajwa MD   RN:  Valeria Nowak RN   PT:   Gay Bee, PT, DPT  OT:  Rakan Ryan, MS, OTR/L   ST:  Not Applicable  CM:  Kat Gerber RN, [facility]  REC:  None  RT:  None  RPh:  Carrington Sosa [facility]  Other:   None  All reporting clinicians have a working knowledge of this patient's plan of care.    Targeted DC Date:  1/12/19     Medical    Patient Active Problem List    Diagnosis Date Noted   • Cervical myelopathy (HCC) 09/23/2015     Priority: High     Class: Acute   • Vitamin D deficiency 01/04/2019   • Pedal edema 01/04/2019   • Pain 01/03/2019   • Mixed hyperlipidemia 01/03/2019   • Anemia 01/03/2019   • Debility 11/10/2016   • Spinal stenosis of lumbar region with neurogenic claudication 11/07/2016   • Postoperative CSF leak 11/07/2016   • Stenosis, spinal, lumbar 10/19/2016   • Cervical stenosis of spine 10/19/2016   • Dysesthesia 10/19/2016   • Dehiscence of incision 04/05/2016   • Asthma 01/28/2016   • Impaired mobility and ADLs 01/27/2016   • Intervertebral disc disorder with radiculopathy of lumbosacral region 01/22/2016   • Spinal stenosis in cervical region 09/16/2015   • Acute respiratory failure with hypercapnia (HCC) 09/16/2015   • Congestive heart failure (HCC) 09/16/2015   • Type 2 diabetes mellitus with hyperglycemia (HCC) 09/16/2015   • Hypothyroid 09/16/2015   • HTN (hypertension) 09/16/2015   • Hypothyroid 09/16/2015   • Chronic kidney disease, stage II (mild) 07/09/2015   • Renal cyst, native, hemorrhage 07/09/2015   • Renal cysts, acquired, bilateral 07/09/2015   • HTN (hypertension) 07/09/2015   • Low back pain 04/21/2015   • Acute renal failure (HCC) 07/19/2014     Results     Procedure Component Value Ref Range Date/Time    ACCU-CHEK GLUCOSE [[id number]]  (Abnormal) Collected:  01/07/19 1123     Order Status:  Completed Lab Status:  Final result Updated:  01/07/19 1136     Glucose - Accu-Ck 159 (H) 65 - 99 mg/dL     ACCU-CHEK GLUCOSE [[id number]]  (Abnormal) Collected:  01/07/19 0826    Order Status:  Completed Lab Status:  Final result Updated:  01/07/19 0852     Glucose - Accu-Ck 129 (H) 65 - 99 mg/dL     ACCU-CHEK GLUCOSE [[id number]]  (Abnormal) Collected:  01/06/19 2012    Order Status:  Completed Lab Status:  Final result Updated:  01/06/19 2102     Glucose - Accu-Ck 177 (H) 65 - 99 mg/dL     ACCU-CHEK GLUCOSE [[id number]]  (Abnormal) Collected:  01/06/19 1722    Order Status:  Completed Lab Status:  Final result Updated:  01/06/19 1735     Glucose - Accu-Ck 116 (H) 65 - 99 mg/dL            Nursing  Diet/Nutrition:  Diabetic and Thin Liquids  Medication Administration:  Whole with Liquid Wash  % consumed at meals in last 24 hours:  Consumed % of meals per documentation.  Meal/Snack Consumption for the past 24 hrs:  Supplement: Boost Glucose Control    Oral Nutrition   01/06/19 1800 Dinner;Between % Consumed   01/06/19 1200 Lunch;Between 25-50% Consumed   01/06/19 0910 Breakfast;Between % Consumed       Snack schedule:  HS  Appetite:  Good  Fluid Intake/Output in past 24 hours: In: 1300 [P.O.:1300]  Out: -   Admit Weight:  Weight: 115 kg (253 lb 9.6 oz)  Weight Last 7 Days   [115 kg (253 lb 9.6 oz)] 115 kg (253 lb 9.6 oz) (01/03 1111)    Pain Issues:    Location:  Hip (01/06 2000)  Right;Left (01/06 2000)         Severity:  Moderate   Scheduled pain medications:  gabapentin (NEURONTIN)    And Tylenol  PRN pain medications used in last 24 hours:  None   Non Pharmacologic Interventions:  repositioned and rest  Effectiveness of pain management plan:  good=patient states acceptable comfort after interventions    Bowel:    Bowel Assist Initial Score:  6 - Modified Independent  Bowel Assist Current Score:  6 - Modified Independent  Bowl Accidents in last 7 days:     Last bowel movement:  01/06/19  Stool Description: Large, Loose     Usual bowel pattern:  every other day  Scheduled bowel medications:  docusate sodium (COLACE) and senna-docusate (PERICOLACE or SENOKOT S)   PRN bowel medications used in last 24 hours:  magnesium hydroxide (MILK OF MAGNESIA)   Nursing Interventions:  PRN medication, Scheduled medication, Supervision, Verbal cueing, Brief  Effectiveness of bowel program:   fair=sometimes needs prn bowel meds for constipation  Bladder:    Bladder Assist Initial Score:  6 - Modified Independent  Bladder Assist Current Score:  4 - Minimal Assistance  Bladder Accidents in last 7 days:     PVR range for past 24-48 hours: -- ()  Intermittent Catheterization:  N/a  Medications affecting bladder:  Elavil    Time void schedule/voiding pattern:  Voiding every 2-4 hours  Interventions:  Increased time, Supervision, Brief  Effectiveness of bladder training:  Stress incontinence persist  [place]:    Type:     Patient Lines/Drains/Airways Status    Active Catheter     None              Date placed:          Justification:    Diabetes:  Blood Sugar Frequency:  Before meals and at bedtime    Range of BS for last 48 hours:   Recent Labs      01/05/19   0730  01/05/19   1128  01/05/19   1704  01/05/19   2045  01/06/19   0824  01/06/19   1137  01/06/19   1722  01/06/19 2012   POCGLUCOSE  102*  164*  100*  159*  131*  107*  116*  177*     Scheduled diabetic medications:  insulin glargine (LANTUS) injection   Sliding scale usage in past 24 hours:  No  Total Short acting insulin in the past 24 hours:  None  Total Long acting insulin in the past 24 hours:  Lantus 30 units    Wound:         Patient Lines/Drains/Airways Status    Active Current Wounds     Name: Placement date: Placement time: Site: Days:    Surgical Incision  Incision Neck 09/16/15   1132      1208    Surgical Incision  Incision N/A Back 01/22/16   0822      1080    Surgical Incision  Incision Back 10/13/16   1618      815    Surgical Incision   Incision Cervical Posterior 10/13/16   1618      815    Surgical Incision  Incision Posterior Back 10/27/16   2130      801    Surgical Incision  Incision Cervical Posterior 10/27/16   2130      801    Wound POA Rash Groin Right 11/07/16   2247    790    Wound POA Rash Groin Left 11/07/16   2247    790    Wound POA Abrasion Mouth Right Upper 11/07/16   2247    790                     Effectiveness of intervention:  wound is improving         Sleep/wake cycle:   Average hours slept:  Sleeps 3-4 hours without waking  Sleep medication usage:  None    Patient/Family Training/Education:  Bladder Management/Training and Diabetes Management  Discharge Supply Recommendations:  Glucometer and Strips and Wound Care Supplies  Strengths: Alert and oriented, Willingly participates in therapeutic activities, Able to follow instructions, Supportive family, Pleasant and cooperative, Effective communication skills, Good carryover of learning, Motivated for self care and independence, Good endurance and Manages pain appropriately   Barriers:   Bladder incontinence            Nursing Problems           Problem: Bowel/Gastric:     Goal: Normal bowel function is maintained or improved           Goal: Will not experience complications related to bowel motility     Flowsheet:     Taken at 01/06/19 0915    Number of Times Stooled 1 by Valeria Nowak R.N.    Last BM 01/06/19 by Valeria Nowak R.N.                  Problem: Communication     Goal: The ability to communicate needs accurately and effectively will improve             Problem: Discharge Barriers/Planning     Goal: Patient's continuum of care needs will be met             Problem: Infection     Goal: Will remain free from infection             Problem: Knowledge Deficit     Goal: Knowledge of disease process/condition, treatment plan, diagnostic tests, and medications will improve           Goal: Knowledge of the prescribed therapeutic regimen will improve              Problem: Pain Management     Goal: Pain level will decrease to patient's comfort goal             Problem: Safety     Goal: Will remain free from injury           Goal: Will remain free from falls             Problem: Skin Integrity     Goal: Risk for impaired skin integrity will decrease             Problem: Urinary Elimination:     Goal: Ability to reestablish a normal urinary elimination pattern will improve             Problem: Venous Thromboembolism (VTW)/Deep Vein Thrombosis (DVT) Prevention:     Goal: Patient will participate in Venous Thrombosis (VTE)/Deep Vein Thrombosis (DVT)Prevention Measures     Flowsheet:     Taken at 01/04/19 2025    Pharmacologic Prophylaxis Used LMWH: Enoxaparin(Lovenox) by Richie Polk R.N.    Risk Assessment Score 2 (calculated) by Richie Polk R.N.    VTE RISK Moderate (calculated) by Richie Polk R.N.                       Long Term Goals:   At discharge patient will be able to functon safely and independently at home and in the community.    Section completed by:  Veronika Edward R.N.              Mobility  Bed mobility:   4  Bed /Chair/Wheelchair Transfer Initial:  1 - Total Assistance  Bed /Chair/Wheelchair Transfer Current:  4 - Minimal Assistance   Bed/Chair/Wheelchair Transfer Description:  Supervision for safety, Increased time  Walk Initial:  1 - Total Assistance  Walk Current:  2 - Max Assistance   Walk Description:   (100 ft with FWW, SBA. Further distance limited by incisional back pain. )  Wheelchair Initial:  2 - Max Assistance  Wheelchair Current:  2 - Max Assistance   Wheelchair Description:   (55 feet with BUEs, MIN A and max cues for turn management.)  Stairs Initial:  2 - Max Assistance  Stairs Current: 2 - Max Assistance   Stairs Description: Hand rails, Extra time, Safety concerns, Verbal cueing, Ascends/descends 4 to 11 steps  Patient/Family Training/Education:  Stair training, standing balance, precautions,  retro AMB/ turns, conservative pain physiology, seated therex program, weight shift for pressure relief  DME/DC Recommendations:  Pt owns FWW, outpatient PT  Strengths:  Effective communication skills, Making steady progress towards goals, Pleasant and cooperative and Willingly participates in therapeutic activities, good community support  Barriers:   Home accessibility, Limited mobility, Poor activity tolerance and Poor balance back pain, radiating leg pain, fear avoidance, poorly fitting brace, fatigue  # of short term goals set= 5  # of short term goals met=2       Physical Therapy Problems           Problem: Mobility     Dates: Start: 01/04/19       Goal: STG-Within one week, patient will ambulate household distance     Dates: Start: 01/04/19       Description: 1) Individualized goal:  50ft  X1, FWW, SBA  2) Interventions:  PT Group Therapy, PT E Stim Attended, PT Gait Training, PT Therapeutic Exercises, PT Neuro Re-Ed/Balance, PT Therapeutic Activity, PT Manual Therapy and PT Evaluation             Goal: STG-Within one week, patient will ascend and descend four to six stairs     Dates: Start: 01/04/19               Problem: Mobility Transfers     Dates: Start: 01/04/19       Goal: STG-Within one week, patient will perform bed mobility     Dates: Start: 01/04/19             Goal: STG-Within one week, patient will roll     Dates: Start: 01/04/19             Goal: STG-Within one week, patient will transfer bed to chair     Dates: Start: 01/04/19               Problem: PT-Long Term Goals     Dates: Start: 01/04/19       Goal: LTG-By discharge, patient will ambulate     Dates: Start: 01/04/19             Goal: LTG-By discharge, patient will transfer one surface to another     Dates: Start: 01/04/19             Goal: LTG-By discharge, patient will ambulate up/down 4-6 stairs     Dates: Start: 01/04/19             Goal: LTG-By discharge, patient will transfer in/out of a car     Dates: Start: 01/04/19                      Section completed by:  Gay Bee, PT       Activities of Daily Living  Eating Initial:  7 - Independent  Eating Current:  7 - Independent   Eating Description:     Grooming Initial:  5 - Standby Prompting/Supervision or Set-up  Grooming Current:  5 - Standby Prompting/Supervision or Set-up   Grooming Description:  Supervision for safety (supervised standing)  Bathing Initial:  0 - Not tested, patient refused  Bathing Current:  4 - Minimal Assistance   Bathing Description:  Grab bar, Increased time, Hand held shower, Requires minimal contact  Upper Body Dressing Initial:  5 - Standby Prompting/Supervision or Set-up  Upper Body Dressing Current:  5 - Standby Prompting/Supervision or Set-up   Upper Body Dressing Description:  Set-up of equipment  Lower Body Dressing Initial:  1 - Total Assistance  Lower Body Dressing Current:  5 - Standby Prompting/Supervsion or Set-up   Lower Body Dressing Description:  5 - Standby Prompting/Supervsion or Set-up  Toileting Initial:  2 - Max Assistance  Toileting Current:  6 - Modified Independent   Toileting Description:  Grab bar (mod I grab bar and FWW)  Toilet Transfer Initial:  1 - Total Assistance  Toilet Transfer Current:  6 - Modified Independent   Toilet Transfer Description:  6 - Modified Independent  Tub / Shower Transfer Initial:  0 - Not tested, patient refused  Tub / Shower Transfer Current:  4 - Minimal Assistance   Tub / Shower Transfer Description:  Supervision for safety, Increased time, Grab bar (CGA with grab bar)  IADL:  Not yet addressed  Family Training/Education:  None  DME/DC Recommendations:  Has all needed DME at home, should not need any further OT after d/c from rehab    Strengths:  Alert and oriented, Effective communication skills, Good carryover of learning, Good insight into deficits/needs, Independent PLOF, Making steady progress towards goals, Manages pain appropriately, Motivated for self care and independence, Pleasant and cooperative and  Willingly participates in therapeutic activities  Barriers:  Generalized weakness and Other: impaired balance, impaired endurance, spinal precautions     # of short term goals set= 4    # of short term goals met= 3         Occupational Therapy Goals           Problem: Bathing     Dates: Start: 01/04/19       Goal: STG-Within one week, patient will bathe     Dates: Start: 01/04/19       Description: 1) Individualized Goal:  Body with setup and supervision using AE/AD/techniques as needed.  2) Interventions:  OT Group Therapy, OT Self Care/ADL, OT Community Reintegration, OT Manual Ther Technique, OT Neuro Re-Ed/Balance, OT Sensory Int Techniques, OT Therapeutic Activity, OT Evaluation and OT Therapeutic Exercise       Note:     Goal Note filed on 01/07/19 1103 by Rakan Ryan MS,OTR/L    Goal: STG-Within one week, patient will bathe  Outcome: NOT MET  Min A                  Problem: OT Long Term Goals     Dates: Start: 01/04/19       Goal: LTG-By discharge, patient will complete basic self care tasks     Dates: Start: 01/04/19       Description: 1) Individualized Goal:  With mod I using AE/AD/techniques as needed.  2) Interventions:  OT Group Therapy, OT Self Care/ADL, OT Community Reintegration, OT Manual Ther Technique, OT Neuro Re-Ed/Balance, OT Sensory Int Techniques, OT Therapeutic Activity, OT Evaluation and OT Therapeutic Exercise           Goal: LTG-By discharge, patient will perform bathroom transfers     Dates: Start: 01/04/19       Description: 1) Individualized Goal:  With mod I using AE/AD/techniques as needed.  2) Interventions:  OT Group Therapy, OT Self Care/ADL, OT Community Reintegration, OT Manual Ther Technique, OT Neuro Re-Ed/Balance, OT Sensory Int Techniques, OT Therapeutic Activity, OT Evaluation and OT Therapeutic Exercise                 Section completed by:  Rakan Ryan, MS,OTR/L             REHAB-Pharmacy IDT Team Note by Carrington Sosa [facility] at 1/4/2019  2:38 PM  Version 1  of 1    Author:  Carrington Sosa RPH Service:  (none) Author Type:  Pharmacist    Filed:  1/4/2019  2:40 PM Date of Service:  1/4/2019  2:38 PM Status:  Signed    :  Carrington Sosa RPH (Pharmacist)         Pharmacy   Pharmacy  Antibiotics/Antifungals/Antivirals:  Medication:      Active Orders     None        Route:        NA  Stop Date:  NA  Reason:      NA  Antihypertensives/Cardiac:  Medication:    Orders (72h ago through future)    Start     Ordered    01/03/19 2100  amLODIPine (NORVASC) tablet 2.5 mg  EVERY BEDTIME      01/03/19 1114    01/03/19 2100  atorvastatin (LIPITOR) tablet 20 mg  EVERY BEDTIME      01/03/19 1114    01/03/19 2100  metoprolol SR (TOPROL XL) tablet 200 mg  2 TIMES DAILY      01/03/19 1114    01/03/19 1114  hydrALAZINE (APRESOLINE) tablet 25 mg  EVERY 8 HOURS PRN      01/03/19 1114        Patient Vitals for the past 24 hrs:   BP Pulse   01/04/19 0630 138/76 65       Anticoagulation:  Medication: Lovenox    Other key medications: A review of the medication list reveals no issues at this time. Patient is currently on antihypertensive(s). Recommend home blood pressure monitoring/recording if antihypertensive(s) regimen(s) continue. Patient is currently on diabetic medication(s) and/or Insulin(s). Recommend home blood glucose monitoring/recording if these regimen(s) continue.    Section completed by: Carrington Sosa [facility][[email or web address]]     Attribution Key     AW.1 - Carrington Sosa RPH on 1/4/2019  2:38 PM                    DC Planning  DC destination/dispostion:  Patient lives with a friend in a 2 level home.  She does not need to access the second level.    DC Needs: She may be best to start with home health therapy unless mobility greatly improves.  Patient has a fww, 4ww, cane, grab bars and shower chair.  She will need follow up with PCP and neurosurgery.    Barriers to discharge: none at this time      Strengths: good support system    Section completed by:  Kat Gerber,  R.N.      Physician Summary  Jace Bajwa MD participated and led team conference discussion.

## 2019-01-07 NOTE — PROGRESS NOTES
"Rehab Progress Note     Encounter date: 1/7/2019  Today I met with the patient face to face in her room    Chief Complaint:  Spinal stenosis of lumbar region with neurogenic claudication , ongoing progress, cough    Interval Events (subjective)  Ms. Chaparro reports that she is continuing to do well.  She reports significant improvement in left thigh pain.  She reports that her nerve pain is gradually improving.  She reports that she is moving better and is now able to get her leg into bed without assistance.  She denies any fevers, chills, headache, dizziness, chest pain, or shortness of breath.  She reports that she has had a lingering cough since surgery.  We discussed the use of Tessalon Perles, and she would like to try this.  She believes that she is on track for discharge earlier than her projected date, and she is very confident with the progress that she has made.    Objective:  VITAL SIGNS: /61   Pulse 70   Temp 37.1 °C (98.8 °F) (Temporal)   Resp 20   Ht 1.676 m (5' 6\")   Wt 115 kg (253 lb 9.6 oz)   SpO2 93%   Breastfeeding? No   BMI 40.93 kg/m²     Recent Results (from the past 72 hour(s))   ACCU-CHEK GLUCOSE    Collection Time: 01/04/19 11:28 AM   Result Value Ref Range    Glucose - Accu-Ck 123 (H) 65 - 99 mg/dL   ACCU-CHEK GLUCOSE    Collection Time: 01/04/19  5:29 PM   Result Value Ref Range    Glucose - Accu-Ck 133 (H) 65 - 99 mg/dL   ACCU-CHEK GLUCOSE    Collection Time: 01/04/19  8:20 PM   Result Value Ref Range    Glucose - Accu-Ck 185 (H) 65 - 99 mg/dL   ACCU-CHEK GLUCOSE    Collection Time: 01/05/19  7:30 AM   Result Value Ref Range    Glucose - Accu-Ck 102 (H) 65 - 99 mg/dL   ACCU-CHEK GLUCOSE    Collection Time: 01/05/19 11:28 AM   Result Value Ref Range    Glucose - Accu-Ck 164 (H) 65 - 99 mg/dL   ACCU-CHEK GLUCOSE    Collection Time: 01/05/19  5:04 PM   Result Value Ref Range    Glucose - Accu-Ck 100 (H) 65 - 99 mg/dL   ACCU-CHEK GLUCOSE    Collection Time: 01/05/19  8:45 PM "   Result Value Ref Range    Glucose - Accu-Ck 159 (H) 65 - 99 mg/dL   ACCU-CHEK GLUCOSE    Collection Time: 01/06/19  8:24 AM   Result Value Ref Range    Glucose - Accu-Ck 131 (H) 65 - 99 mg/dL   ACCU-CHEK GLUCOSE    Collection Time: 01/06/19 11:37 AM   Result Value Ref Range    Glucose - Accu-Ck 107 (H) 65 - 99 mg/dL   ACCU-CHEK GLUCOSE    Collection Time: 01/06/19  5:22 PM   Result Value Ref Range    Glucose - Accu-Ck 116 (H) 65 - 99 mg/dL   ACCU-CHEK GLUCOSE    Collection Time: 01/06/19  8:12 PM   Result Value Ref Range    Glucose - Accu-Ck 177 (H) 65 - 99 mg/dL   ACCU-CHEK GLUCOSE    Collection Time: 01/07/19  8:26 AM   Result Value Ref Range    Glucose - Accu-Ck 129 (H) 65 - 99 mg/dL       Current Facility-Administered Medications   Medication Frequency   • benzonatate (TESSALON) capsule 100 mg TID PRN   • vitamin D (cholecalciferol) tablet 4,000 Units DAILY   • acetaminophen (TYLENOL) tablet 975 mg Q8HRS   • Respiratory Care per Protocol Continuous RT   • Pharmacy Consult Request ...Pain Management Review 1 Each PRN   • hydrALAZINE (APRESOLINE) tablet 25 mg Q8HRS PRN   • artificial tears 1.4 % ophthalmic solution 1 Drop PRN   • benzocaine-menthol (CEPACOL) lozenge 1 Lozenge Q2HRS PRN   • mag hydrox-al hydrox-simeth (MAALOX PLUS ES or MYLANTA DS) suspension 20 mL Q2HRS PRN   • ondansetron (ZOFRAN ODT) dispertab 4 mg 4X/DAY PRN    Or   • ondansetron (ZOFRAN) syringe/vial injection 4 mg 4X/DAY PRN   • traZODone (DESYREL) tablet 50 mg QHS PRN   • sodium chloride (OCEAN) 0.65 % nasal spray 2 Spray PRN   • oxyCODONE immediate-release (ROXICODONE) tablet 5 mg Q4HRS PRN   • oxyCODONE immediate release (ROXICODONE) tablet 10 mg Q4HRS PRN   • enoxaparin (LOVENOX) inj 40 mg QHS   • bisacodyl (DULCOLAX) suppository 10 mg Q24HRS PRN   • calcium carbonate (TUMS) chewable tab 500 mg BID   • docusate sodium (COLACE) capsule 100 mg BID   • magnesium hydroxide (MILK OF MAGNESIA) suspension 30 mL QDAY PRN   • senna-docusate  (PERICOLACE or SENOKOT S) 8.6-50 MG per tablet 1 Tab Nightly   • tizanidine (ZANAFLEX) tablet 2 mg TID PRN   • amitriptyline (ELAVIL) tablet 20 mg QHS   • amLODIPine (NORVASC) tablet 2.5 mg QHS   • atorvastatin (LIPITOR) tablet 20 mg QHS   • budesonide-formoterol (SYMBICORT) 80-4.5 MCG/ACT inhaler 2 Puff BID   • gabapentin (NEURONTIN) capsule 600 mg QHS   • insulin glargine (LANTUS) injection 30 Units Nightly   • metFORMIN (GLUCOPHAGE) tablet 500 mg BID WITH MEALS   • metoprolol SR (TOPROL XL) tablet 200 mg BID   • SITagliptin (JANUVIA) tablet 100 mg QAM   • levothyroxine (SYNTHROID) tablet 125 mcg AM ES    And   • levothyroxine (SYNTHROID) tablet 50 mcg AM ES   • gabapentin (NEURONTIN) capsule 300 mg BID       Exam Date: 1/7/2019    General:  Awake, alert, oriented, no acute distress  HEENT: Stable mild disconjugate gaze.  No erythema in the oropharynx.  Cardiac: regular rate and rhythm  Lungs: clear to auscultation bilaterally.  No wheezes.  Abdomen: soft; non tender, non distended, bowel sounds present and normoactive  Extremities: Stable mild ankle edema  Neuro:   She is able to transition from supine to sitting independently.  Left hip flexion has improved to 3/5 today in a sitting position.  Left knee extensor is also 3/5.      Orders Placed This Encounter   Procedures   • Diet Order Diabetic     Standing Status:   Standing     Number of Occurrences:   1     Order Specific Question:   Diet:     Answer:   Diabetic [3]     Order Specific Question:   Calorie modifications:     Answer:   2000 kcals [5]       Assessment:  Active Hospital Problems    Diagnosis   • *Spinal stenosis of lumbar region with neurogenic claudication   • Vitamin D deficiency   • Pain   • Mixed hyperlipidemia   • Anemia   • Impaired mobility and ADLs   • Congestive heart failure (HCC)   • Type 2 diabetes mellitus with hyperglycemia (HCC)   • Hypothyroid   • Hypothyroid   • HTN (hypertension)       Medical Decision Making and Plan:  Ms.  Shankar is a 73-year-old female admitted for rehabilitation on January 3 due to lumbar stenosis     Lumbar stenosis status post L3-L5 posterior instrumented fusion by Dr. 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  History of multiple lower back surgeries  Continue comprehensive rehabilitation  TLSO on when out of bed    Staples can be removed postoperative day 14 (around January 10)     Pain  Scheduled Tylenol until pain is better controlled  Oxycodone 2.5-5 mg every 4 hours as needed--dose decreased January 7 due to improvement and decreased utilization     She has used 5 mg of oxycodone in the last 24 hours     Elavil 20 mg at bedtime     Gabapentin 300 mg in the morning and midday and 600 mg at bedtime     Zanaflex 2 mg every 8 hours as needed     Type 2 diabetes with hyperglycemia  Lantus 30 units nightly  Januvia 100 mg  Metformin 500 mg twice a day  Consulting hospitalist    Glucose range of 116-179 in the last 24 hours     Hypertension  Congestive heart failure   Amlodipine 2.5 mg at bedtime  Toprol- mg twice daily    Blood pressure today is 149/61 mmHg   Appreciate hospitalist assistance    Hyperlipidemia  Lipitor 20 mg at bedtime  Total cholesterol 126, LDL 52, HDL 35 on January 4     Hypothyroidism  Synthroid 175 mcg of daily  TSH was 0.5 on January 4     Mild anemia   hemoglobin continues to gradually improve to 11.4 on January 4  Recheck tomorrow     Leukocytosis   recheck tomorrow  Maximum temperature in last 24 hours of 37.1 degrees     Asthma  Symbicort twice daily     Bowel and bladder  History of urinary incontinence   bladder scan of 11 today  Continue to monitor     Colace 100 mg twice a day  Katarzyna-Colace nightly  Milk of magnesia as needed  Bisacodyl suppository as needed     Vitamin D deficiency  Vitamin D was 6 on admission, so we began supplementation with 4000 units of cholecalciferol daily  Continue calcium supplementation with  Tums     DVT prophylaxis  Lovenox 40 mg qhs     Estimated discharge: January 12  She will need outpatient PT    IDT Team Conference 1/7/2019  I individually evaluated the patient today.  In addition to my daily follow up visit, I was present for and led the interdisciplinary team conference on 1/7/2019 and agree with the interdisciplinary conference documentation and plan of care.     RN: She is doing well.  Her pain is at baseline and she is weaning from her oxycodone.  She did have a bowel accident today due to medications for constipation.     PT:  Her activity tolerance and pain limitation are improving.  Balance and radicular pain are still limiting.       OT:  She is making good initial progress.  Balance and weakness are limiting.  She has difficulty recalling spinal precautions.  Safety awareness is good.      Total time:  35 minutes.  I spent greater than 50% of the time for patient care, counseling, and coordination on this date, including unit/floor time, and face-to-face time with the patient as per interval events and assessment and plan above. Topics discussed included functional progress, improved pain, improved mobility, discharge planning        Jace Bajwa M.D.  1/7/2019

## 2019-01-07 NOTE — REHAB-NURSING IDT TEAM NOTE
Nursing   Nursing  Diet/Nutrition:  Diabetic and Thin Liquids  Medication Administration:  Whole with Liquid Wash  % consumed at meals in last 24 hours:  Consumed % of meals per documentation.  Meal/Snack Consumption for the past 24 hrs:  Supplement: Boost Glucose Control    Oral Nutrition   01/06/19 1800 Dinner;Between % Consumed   01/06/19 1200 Lunch;Between 25-50% Consumed   01/06/19 0910 Breakfast;Between % Consumed       Snack schedule:  HS  Appetite:  Good  Fluid Intake/Output in past 24 hours: In: 1300 [P.O.:1300]  Out: -   Admit Weight:  Weight: 115 kg (253 lb 9.6 oz)  Weight Last 7 Days   [115 kg (253 lb 9.6 oz)] 115 kg (253 lb 9.6 oz) (01/03 1111)    Pain Issues:    Location:  Hip (01/06 2000)  Right;Left (01/06 2000)         Severity:  Moderate   Scheduled pain medications:  gabapentin (NEURONTIN)    And Tylenol  PRN pain medications used in last 24 hours:  None   Non Pharmacologic Interventions:  repositioned and rest  Effectiveness of pain management plan:  good=patient states acceptable comfort after interventions    Bowel:    Bowel Assist Initial Score:  6 - Modified Independent  Bowel Assist Current Score:  6 - Modified Independent  Bowl Accidents in last 7 days:     Last bowel movement: 01/06/19  Stool Description: Large, Loose     Usual bowel pattern:  every other day  Scheduled bowel medications:  docusate sodium (COLACE) and senna-docusate (PERICOLACE or SENOKOT S)   PRN bowel medications used in last 24 hours:  magnesium hydroxide (MILK OF MAGNESIA)   Nursing Interventions:  PRN medication, Scheduled medication, Supervision, Verbal cueing, Brief  Effectiveness of bowel program:   fair=sometimes needs prn bowel meds for constipation  Bladder:    Bladder Assist Initial Score:  6 - Modified Independent  Bladder Assist Current Score:  4 - Minimal Assistance  Bladder Accidents in last 7 days:     PVR range for past 24-48 hours: -- ()  Intermittent Catheterization:  N/a  Medications  affecting bladder:  Elavil    Time void schedule/voiding pattern:  Voiding every 2-4 hours  Interventions:  Increased time, Supervision, Brief  Effectiveness of bladder training:  Stress incontinence persist  [place]:    Type:     Patient Lines/Drains/Airways Status    Active Catheter     None              Date placed:          Justification:    Diabetes:  Blood Sugar Frequency:  Before meals and at bedtime    Range of BS for last 48 hours:   Recent Labs      01/05/19   0730  01/05/19   1128  01/05/19   1704  01/05/19   2045  01/06/19   0824  01/06/19   1137  01/06/19   1722  01/06/19 2012   POCGLUCOSE  102*  164*  100*  159*  131*  107*  116*  177*     Scheduled diabetic medications:  insulin glargine (LANTUS) injection   Sliding scale usage in past 24 hours:  No  Total Short acting insulin in the past 24 hours:  None  Total Long acting insulin in the past 24 hours:  Lantus 30 units    Wound:         Patient Lines/Drains/Airways Status    Active Current Wounds     Name: Placement date: Placement time: Site: Days:    Surgical Incision  Incision Neck 09/16/15   1132      1208    Surgical Incision  Incision N/A Back 01/22/16   0822      1080    Surgical Incision  Incision Back 10/13/16   1618      815    Surgical Incision  Incision Cervical Posterior 10/13/16   1618      815    Surgical Incision  Incision Posterior Back 10/27/16   2130      801    Surgical Incision  Incision Cervical Posterior 10/27/16   2130      801    Wound POA Rash Groin Right 11/07/16   2247    790    Wound POA Rash Groin Left 11/07/16   2247    790    Wound POA Abrasion Mouth Right Upper 11/07/16   2247    790                     Effectiveness of intervention:  wound is improving         Sleep/wake cycle:   Average hours slept:  Sleeps 3-4 hours without waking  Sleep medication usage:  None    Patient/Family Training/Education:  Bladder Management/Training and Diabetes Management  Discharge Supply Recommendations:  Glucometer and Strips and  Wound Care Supplies  Strengths: Alert and oriented, Willingly participates in therapeutic activities, Able to follow instructions, Supportive family, Pleasant and cooperative, Effective communication skills, Good carryover of learning, Motivated for self care and independence, Good endurance and Manages pain appropriately   Barriers:   Bladder incontinence            Nursing Problems           Problem: Bowel/Gastric:     Goal: Normal bowel function is maintained or improved           Goal: Will not experience complications related to bowel motility     Flowsheet:     Taken at 01/06/19 0915    Number of Times Stooled 1 by Valeria Nowak R.N.    Last BM 01/06/19 by Valeria Nowak R.N.                  Problem: Communication     Goal: The ability to communicate needs accurately and effectively will improve             Problem: Discharge Barriers/Planning     Goal: Patient's continuum of care needs will be met             Problem: Infection     Goal: Will remain free from infection             Problem: Knowledge Deficit     Goal: Knowledge of disease process/condition, treatment plan, diagnostic tests, and medications will improve           Goal: Knowledge of the prescribed therapeutic regimen will improve             Problem: Pain Management     Goal: Pain level will decrease to patient's comfort goal             Problem: Safety     Goal: Will remain free from injury           Goal: Will remain free from falls             Problem: Skin Integrity     Goal: Risk for impaired skin integrity will decrease             Problem: Urinary Elimination:     Goal: Ability to reestablish a normal urinary elimination pattern will improve             Problem: Venous Thromboembolism (VTW)/Deep Vein Thrombosis (DVT) Prevention:     Goal: Patient will participate in Venous Thrombosis (VTE)/Deep Vein Thrombosis (DVT)Prevention Measures     Flowsheet:     Taken at 01/04/19 2025    Pharmacologic Prophylaxis Used LMWH:  Enoxaparin(Lovenox) by Richie Polk R.N.    Risk Assessment Score 2 (calculated) by Richie Polk R.N.    VTE RISK Moderate (calculated) by Richie Polk R.N.                       Long Term Goals:   At discharge patient will be able to functon safely and independently at home and in the community.    Section completed by:  Veronika Edward R.N.

## 2019-01-07 NOTE — REHAB-OT IDT TEAM NOTE
Occupational Therapy   Activities of Daily Living  Eating Initial:  7 - Independent  Eating Current:  7 - Independent   Eating Description:     Grooming Initial:  5 - Standby Prompting/Supervision or Set-up  Grooming Current:  5 - Standby Prompting/Supervision or Set-up   Grooming Description:  Supervision for safety (supervised standing)  Bathing Initial:  0 - Not tested, patient refused  Bathing Current:  4 - Minimal Assistance   Bathing Description:  Grab bar, Increased time, Hand held shower, Requires minimal contact  Upper Body Dressing Initial:  5 - Standby Prompting/Supervision or Set-up  Upper Body Dressing Current:  5 - Standby Prompting/Supervision or Set-up   Upper Body Dressing Description:  Set-up of equipment  Lower Body Dressing Initial:  1 - Total Assistance  Lower Body Dressing Current:  5 - Standby Prompting/Supervsion or Set-up   Lower Body Dressing Description:  5 - Standby Prompting/Supervsion or Set-up  Toileting Initial:  2 - Max Assistance  Toileting Current:  6 - Modified Independent   Toileting Description:  Grab bar (mod I grab bar and FWW)  Toilet Transfer Initial:  1 - Total Assistance  Toilet Transfer Current:  6 - Modified Independent   Toilet Transfer Description:  6 - Modified Independent  Tub / Shower Transfer Initial:  0 - Not tested, patient refused  Tub / Shower Transfer Current:  4 - Minimal Assistance   Tub / Shower Transfer Description:  Supervision for safety, Increased time, Grab bar (CGA with grab bar)  IADL:  Not yet addressed  Family Training/Education:  None  DME/DC Recommendations:  Has all needed DME at home, should not need any further OT after d/c from rehab    Strengths:  Alert and oriented, Effective communication skills, Good carryover of learning, Good insight into deficits/needs, Independent PLOF, Making steady progress towards goals, Manages pain appropriately, Motivated for self care and independence, Pleasant and cooperative and Willingly participates in  therapeutic activities  Barriers:  Generalized weakness and Other: impaired balance, impaired endurance, spinal precautions     # of short term goals set= 4    # of short term goals met= 3         Occupational Therapy Goals           Problem: Bathing     Dates: Start: 01/04/19       Goal: STG-Within one week, patient will bathe     Dates: Start: 01/04/19       Description: 1) Individualized Goal:  Body with setup and supervision using AE/AD/techniques as needed.  2) Interventions:  OT Group Therapy, OT Self Care/ADL, OT Community Reintegration, OT Manual Ther Technique, OT Neuro Re-Ed/Balance, OT Sensory Int Techniques, OT Therapeutic Activity, OT Evaluation and OT Therapeutic Exercise       Note:     Goal Note filed on 01/07/19 1103 by Rakan Ryan MS,OTR/L    Goal: STG-Within one week, patient will bathe  Outcome: NOT MET  Min A                  Problem: OT Long Term Goals     Dates: Start: 01/04/19       Goal: LTG-By discharge, patient will complete basic self care tasks     Dates: Start: 01/04/19       Description: 1) Individualized Goal:  With mod I using AE/AD/techniques as needed.  2) Interventions:  OT Group Therapy, OT Self Care/ADL, OT Community Reintegration, OT Manual Ther Technique, OT Neuro Re-Ed/Balance, OT Sensory Int Techniques, OT Therapeutic Activity, OT Evaluation and OT Therapeutic Exercise           Goal: LTG-By discharge, patient will perform bathroom transfers     Dates: Start: 01/04/19       Description: 1) Individualized Goal:  With mod I using AE/AD/techniques as needed.  2) Interventions:  OT Group Therapy, OT Self Care/ADL, OT Community Reintegration, OT Manual Ther Technique, OT Neuro Re-Ed/Balance, OT Sensory Int Techniques, OT Therapeutic Activity, OT Evaluation and OT Therapeutic Exercise                 Section completed by:  Rakan Ryan MS,OTR/L

## 2019-01-07 NOTE — REHAB-PT IDT TEAM NOTE
Physical Therapy   Mobility  Bed mobility:   4  Bed /Chair/Wheelchair Transfer Initial:  1 - Total Assistance  Bed /Chair/Wheelchair Transfer Current:  4 - Minimal Assistance   Bed/Chair/Wheelchair Transfer Description:  Supervision for safety, Increased time  Walk Initial:  1 - Total Assistance  Walk Current:  2 - Max Assistance   Walk Description:   (100 ft with FWW, SBA. Further distance limited by incisional back pain. )  Wheelchair Initial:  2 - Max Assistance  Wheelchair Current:  2 - Max Assistance   Wheelchair Description:   (55 feet with BUEs, MIN A and max cues for turn management.)  Stairs Initial:  2 - Max Assistance  Stairs Current: 2 - Max Assistance   Stairs Description: Hand rails, Extra time, Safety concerns, Verbal cueing, Ascends/descends 4 to 11 steps  Patient/Family Training/Education:  Stair training, standing balance, precautions, retro AMB/ turns, conservative pain physiology, seated therex program, weight shift for pressure relief  DME/DC Recommendations:  Pt owns FWW, outpatient PT  Strengths:  Effective communication skills, Making steady progress towards goals, Pleasant and cooperative and Willingly participates in therapeutic activities, good community support  Barriers:   Home accessibility, Limited mobility, Poor activity tolerance and Poor balance back pain, radiating leg pain, fear avoidance, poorly fitting brace, fatigue  # of short term goals set= 5  # of short term goals met=2       Physical Therapy Problems           Problem: Mobility     Dates: Start: 01/04/19       Goal: STG-Within one week, patient will ambulate household distance     Dates: Start: 01/04/19       Description: 1) Individualized goal:  50ft  X1, FWW, SBA  2) Interventions:  PT Group Therapy, PT E Stim Attended, PT Gait Training, PT Therapeutic Exercises, PT Neuro Re-Ed/Balance, PT Therapeutic Activity, PT Manual Therapy and PT Evaluation             Goal: STG-Within one week, patient will ascend and descend four  to six stairs     Dates: Start: 01/04/19               Problem: Mobility Transfers     Dates: Start: 01/04/19       Goal: STG-Within one week, patient will perform bed mobility     Dates: Start: 01/04/19             Goal: STG-Within one week, patient will roll     Dates: Start: 01/04/19             Goal: STG-Within one week, patient will transfer bed to chair     Dates: Start: 01/04/19               Problem: PT-Long Term Goals     Dates: Start: 01/04/19       Goal: LTG-By discharge, patient will ambulate     Dates: Start: 01/04/19             Goal: LTG-By discharge, patient will transfer one surface to another     Dates: Start: 01/04/19             Goal: LTG-By discharge, patient will ambulate up/down 4-6 stairs     Dates: Start: 01/04/19             Goal: LTG-By discharge, patient will transfer in/out of a car     Dates: Start: 01/04/19                     Section completed by:  Gay Bee, PT

## 2019-01-07 NOTE — CARE PLAN
Problem: Mobility  Goal: STG-Within one week, patient will ambulate household distance  1) Individualized goal:  50ft  X1, FWW, SBA  2) Interventions:  PT Group Therapy, PT E Stim Attended, PT Gait Training, PT Therapeutic Exercises, PT Neuro Re-Ed/Balance, PT Therapeutic Activity, PT Manual Therapy and PT Evaluation     Outcome: MET Date Met: 01/07/19    Goal: STG-Within one week, patient will ascend and descend four to six stairs  Outcome: MET Date Met: 01/07/19

## 2019-01-07 NOTE — CARE PLAN
Problem: Mobility Transfers  Goal: STG-Within one week, patient will perform bed mobility  With SBA  Outcome: NOT MET  Pt requires CGA  Goal: STG-Within one week, patient will roll  SBA, no VCs for precautions  Outcome: NOT MET  Requires Celestino for core strength  Goal: STG-Within one week, patient will transfer bed to chair  SBA with FWW  Outcome: NOT MET  Pt requires CGA for stability

## 2019-01-07 NOTE — PROGRESS NOTES
[facility] Medicine Daily Progress Note    Date of Service  1/7/2019    Chief Complaint:  Hypertension  Diabetes  Pedal edema    Interval History:  No significant events or changes since last visit  Patient denies SOB, cough, or diarrhea  Patient slept ok last night      Review of Systems  Review of Systems   Constitutional: Negative for chills and fever.   Respiratory: Negative for shortness of breath.    Cardiovascular: Negative for chest pain.   Gastrointestinal: Negative for abdominal pain, diarrhea, nausea and vomiting.   Psychiatric/Behavioral: The patient is not nervous/anxious.         Physical Exam  Temp:  [36.5 °C (97.7 °F)-37.1 °C (98.8 °F)] 37.1 °C (98.8 °F)  Pulse:  [67-75] 70  Resp:  [18-20] 20  BP: (122-149)/(61-73) 149/61    Physical Exam   Constitutional: She is oriented to person, place, and time. She appears well-nourished.   HENT:   Head: Atraumatic.   Eyes: Pupils are equal, round, and reactive to light. Conjunctivae are normal.   Neck: Normal range of motion. Neck supple.   Cardiovascular: Normal rate, regular rhythm, S1 normal and S2 normal.    No murmur heard.  Pulmonary/Chest: Effort normal. She has no wheezes. She has no rales.   Abdominal: Soft. She exhibits no distension. There is no tenderness.   Musculoskeletal: She exhibits no edema.   Neurological: She is alert and oriented to person, place, and time. No sensory deficit.   Skin: Skin is warm and dry. No rash noted. No cyanosis.   Psychiatric: She has a normal mood and affect. Her behavior is normal.   Nursing note and vitals reviewed.      Fluids    Intake/Output Summary (Last 24 hours) at 01/07/19 0940  Last data filed at 01/06/19 1800   Gross per 24 hour   Intake              820 ml   Output                0 ml   Net              820 ml       Laboratory                        Imaging    Assessment/Plan  * Spinal stenosis of lumbar region with neurogenic claudication- (present on admission)   Assessment & Plan    S/P surgery     Pedal  edema   Assessment & Plan    Has mild B/L pedal swelling  Has hx and is at baseline     Vitamin D deficiency   Assessment & Plan    Vit D: 6  On high dose supplements     Hypothyroid- (present on admission)   Assessment & Plan    TSH (1/4): ok  On Synthroid     Type 2 diabetes mellitus with hyperglycemia (HCC)- (present on admission)   Assessment & Plan    Hba1c: 6.8 (1/4)  BS a little labile: 107-177  On Lantus: 30 units qhs  On Metformin 500 mg bid  On Januvia: 100 mg daily  Cont to monitor     HTN (hypertension)- (present on admission)   Assessment & Plan    BP ok  On Torpol XL: 200 mg bid  On Norvasc: 2.5 mg daily  Cont to monitor

## 2019-01-07 NOTE — CARE PLAN
Problem: Bowel/Gastric:  Goal: Will not experience complications related to bowel motility  Outcome: PROGRESSING AS EXPECTED  Pt had large loose BM this morning following administration of milk of mag last night.     Problem: Skin Integrity  Goal: Risk for impaired skin integrity will decrease  Outcome: PROGRESSING AS EXPECTED  Lower back incision clean, dry, and intact. Staples in place; wound is SHANDRA. Pt c/o mild pain at incision.

## 2019-01-08 LAB
ANION GAP SERPL CALC-SCNC: 10 MMOL/L (ref 0–11.9)
BASOPHILS # BLD AUTO: 1.1 % (ref 0–1.8)
BASOPHILS # BLD: 0.12 K/UL (ref 0–0.12)
BUN SERPL-MCNC: 26 MG/DL (ref 8–22)
CALCIUM SERPL-MCNC: 9.4 MG/DL (ref 8.5–10.5)
CHLORIDE SERPL-SCNC: 106 MMOL/L (ref 96–112)
CO2 SERPL-SCNC: 26 MMOL/L (ref 20–33)
CREAT SERPL-MCNC: 1.21 MG/DL (ref 0.5–1.4)
EOSINOPHIL # BLD AUTO: 0.37 K/UL (ref 0–0.51)
EOSINOPHIL NFR BLD: 3.3 % (ref 0–6.9)
ERYTHROCYTE [DISTWIDTH] IN BLOOD BY AUTOMATED COUNT: 43.9 FL (ref 35.9–50)
GLUCOSE BLD-MCNC: 117 MG/DL (ref 65–99)
GLUCOSE BLD-MCNC: 157 MG/DL (ref 65–99)
GLUCOSE BLD-MCNC: 162 MG/DL (ref 65–99)
GLUCOSE BLD-MCNC: 168 MG/DL (ref 65–99)
GLUCOSE SERPL-MCNC: 134 MG/DL (ref 65–99)
HCT VFR BLD AUTO: 36.1 % (ref 37–47)
HGB BLD-MCNC: 11.9 G/DL (ref 12–16)
IMM GRANULOCYTES # BLD AUTO: 0.09 K/UL (ref 0–0.11)
IMM GRANULOCYTES NFR BLD AUTO: 0.8 % (ref 0–0.9)
LYMPHOCYTES # BLD AUTO: 2.88 K/UL (ref 1–4.8)
LYMPHOCYTES NFR BLD: 26 % (ref 22–41)
MCH RBC QN AUTO: 31.1 PG (ref 27–33)
MCHC RBC AUTO-ENTMCNC: 33 G/DL (ref 33.6–35)
MCV RBC AUTO: 94.3 FL (ref 81.4–97.8)
MONOCYTES # BLD AUTO: 1.46 K/UL (ref 0–0.85)
MONOCYTES NFR BLD AUTO: 13.2 % (ref 0–13.4)
NEUTROPHILS # BLD AUTO: 6.15 K/UL (ref 2–7.15)
NEUTROPHILS NFR BLD: 55.6 % (ref 44–72)
NRBC # BLD AUTO: 0 K/UL
NRBC BLD-RTO: 0 /100 WBC
PLATELET # BLD AUTO: 420 K/UL (ref 164–446)
PMV BLD AUTO: 10.4 FL (ref 9–12.9)
POTASSIUM SERPL-SCNC: 4 MMOL/L (ref 3.6–5.5)
RBC # BLD AUTO: 3.83 M/UL (ref 4.2–5.4)
SODIUM SERPL-SCNC: 142 MMOL/L (ref 135–145)
WBC # BLD AUTO: 11.1 K/UL (ref 4.8–10.8)

## 2019-01-08 PROCEDURE — 700102 HCHG RX REV CODE 250 W/ 637 OVERRIDE(OP): Performed by: PHYSICAL MEDICINE & REHABILITATION

## 2019-01-08 PROCEDURE — 97530 THERAPEUTIC ACTIVITIES: CPT

## 2019-01-08 PROCEDURE — A9270 NON-COVERED ITEM OR SERVICE: HCPCS | Performed by: PHYSICAL MEDICINE & REHABILITATION

## 2019-01-08 PROCEDURE — 36415 COLL VENOUS BLD VENIPUNCTURE: CPT

## 2019-01-08 PROCEDURE — 97110 THERAPEUTIC EXERCISES: CPT

## 2019-01-08 PROCEDURE — 700111 HCHG RX REV CODE 636 W/ 250 OVERRIDE (IP): Performed by: PHYSICAL MEDICINE & REHABILITATION

## 2019-01-08 PROCEDURE — 99232 SBSQ HOSP IP/OBS MODERATE 35: CPT | Performed by: HOSPITALIST

## 2019-01-08 PROCEDURE — 770010 HCHG ROOM/CARE - REHAB SEMI PRIVAT*

## 2019-01-08 PROCEDURE — 85025 COMPLETE CBC W/AUTO DIFF WBC: CPT

## 2019-01-08 PROCEDURE — 80048 BASIC METABOLIC PNL TOTAL CA: CPT

## 2019-01-08 PROCEDURE — 700112 HCHG RX REV CODE 229: Performed by: PHYSICAL MEDICINE & REHABILITATION

## 2019-01-08 PROCEDURE — 97116 GAIT TRAINING THERAPY: CPT

## 2019-01-08 PROCEDURE — 87086 URINE CULTURE/COLONY COUNT: CPT

## 2019-01-08 PROCEDURE — 81001 URINALYSIS AUTO W/SCOPE: CPT

## 2019-01-08 PROCEDURE — 87186 SC STD MICRODIL/AGAR DIL: CPT

## 2019-01-08 PROCEDURE — 97535 SELF CARE MNGMENT TRAINING: CPT

## 2019-01-08 PROCEDURE — 82962 GLUCOSE BLOOD TEST: CPT | Mod: 91

## 2019-01-08 PROCEDURE — 87077 CULTURE AEROBIC IDENTIFY: CPT

## 2019-01-08 PROCEDURE — 99232 SBSQ HOSP IP/OBS MODERATE 35: CPT | Performed by: PHYSICAL MEDICINE & REHABILITATION

## 2019-01-08 RX ADMIN — GABAPENTIN 300 MG: 300 CAPSULE ORAL at 15:01

## 2019-01-08 RX ADMIN — INSULIN GLARGINE 30 UNITS: 100 INJECTION, SOLUTION SUBCUTANEOUS at 20:45

## 2019-01-08 RX ADMIN — METOPROLOL SUCCINATE 200 MG: 100 TABLET, EXTENDED RELEASE ORAL at 08:34

## 2019-01-08 RX ADMIN — AMITRIPTYLINE HYDROCHLORIDE 20 MG: 10 TABLET, FILM COATED ORAL at 20:43

## 2019-01-08 RX ADMIN — BUDESONIDE AND FORMOTEROL FUMARATE DIHYDRATE 2 PUFF: 80; 4.5 AEROSOL RESPIRATORY (INHALATION) at 20:42

## 2019-01-08 RX ADMIN — BUDESONIDE AND FORMOTEROL FUMARATE DIHYDRATE 2 PUFF: 80; 4.5 AEROSOL RESPIRATORY (INHALATION) at 08:34

## 2019-01-08 RX ADMIN — METOPROLOL SUCCINATE 200 MG: 100 TABLET, EXTENDED RELEASE ORAL at 20:43

## 2019-01-08 RX ADMIN — ACETAMINOPHEN 975 MG: 325 TABLET, FILM COATED ORAL at 15:01

## 2019-01-08 RX ADMIN — SITAGLIPTIN 100 MG: 50 TABLET, FILM COATED ORAL at 08:34

## 2019-01-08 RX ADMIN — AMLODIPINE BESYLATE 2.5 MG: 5 TABLET ORAL at 20:43

## 2019-01-08 RX ADMIN — ENOXAPARIN SODIUM 40 MG: 100 INJECTION SUBCUTANEOUS at 20:42

## 2019-01-08 RX ADMIN — ATORVASTATIN CALCIUM 20 MG: 10 TABLET, FILM COATED ORAL at 20:43

## 2019-01-08 RX ADMIN — CALCIUM CARBONATE (ANTACID) CHEW TAB 500 MG 500 MG: 500 CHEW TAB at 08:34

## 2019-01-08 RX ADMIN — OXYCODONE HYDROCHLORIDE 2.5 MG: 5 TABLET ORAL at 08:40

## 2019-01-08 RX ADMIN — LEVOTHYROXINE SODIUM 125 MCG: 125 TABLET ORAL at 05:03

## 2019-01-08 RX ADMIN — GABAPENTIN 600 MG: 300 CAPSULE ORAL at 20:43

## 2019-01-08 RX ADMIN — LEVOTHYROXINE SODIUM 50 MCG: 50 TABLET ORAL at 05:03

## 2019-01-08 RX ADMIN — CALCIUM CARBONATE (ANTACID) CHEW TAB 500 MG 500 MG: 500 CHEW TAB at 20:42

## 2019-01-08 RX ADMIN — METFORMIN HYDROCHLORIDE 500 MG: 500 TABLET, FILM COATED ORAL at 16:59

## 2019-01-08 RX ADMIN — METFORMIN HYDROCHLORIDE 500 MG: 500 TABLET, FILM COATED ORAL at 08:34

## 2019-01-08 RX ADMIN — VITAMIN D, TAB 1000IU (100/BT) 4000 UNITS: 25 TAB at 08:33

## 2019-01-08 RX ADMIN — ACETAMINOPHEN 975 MG: 325 TABLET, FILM COATED ORAL at 05:03

## 2019-01-08 RX ADMIN — DOCUSATE SODIUM 100 MG: 100 CAPSULE, LIQUID FILLED ORAL at 20:43

## 2019-01-08 RX ADMIN — ACETAMINOPHEN 975 MG: 325 TABLET, FILM COATED ORAL at 20:42

## 2019-01-08 RX ADMIN — GABAPENTIN 300 MG: 300 CAPSULE ORAL at 08:33

## 2019-01-08 RX ADMIN — SENNOSIDES AND DOCUSATE SODIUM 1 TABLET: 8.6; 5 TABLET ORAL at 20:43

## 2019-01-08 ASSESSMENT — ENCOUNTER SYMPTOMS
VOMITING: 0
ABDOMINAL PAIN: 0
SHORTNESS OF BREATH: 0
NAUSEA: 0
FEVER: 0
CHILLS: 0

## 2019-01-08 ASSESSMENT — PAIN SCALES - GENERAL: PAINLEVEL_OUTOF10: 5

## 2019-01-08 NOTE — DISCHARGE PLANNING
Outpatient therapy referral sent to [facility] per choice form.  Awaiting response.

## 2019-01-08 NOTE — CARE PLAN
Problem: Bowel/Gastric:  Goal: Normal bowel function is maintained or improved  Pt. Has been continent during this shift. Pt. Has had two bowel movements so far.     Problem: Pain Management  Goal: Pain level will decrease to patient's comfort goal  Pt. C/o 5/10 back and hip pain. PRN Roxicodone 2.5 mg was given today with good relief.

## 2019-01-08 NOTE — CARE PLAN
Problem: Respiratory:  Goal: Respiratory status will improve  Outcome: PROGRESSING SLOWER THAN EXPECTED  Pt c/o frequent productive cough. Per Dr Bajwa, given Tessalon 100 mg tid PRN. Received first dose this afternoon, reported significant improvement.

## 2019-01-08 NOTE — CARE PLAN
Problem: Discharge Barriers/Planning  Goal: Patient's continuum of care needs will be met  Outcome: PROGRESSING AS EXPECTED  Planned discharge date set for Saturday 1/12. Pt to receive outpatient therapy w/family support. Pt expresses excitement to return home.

## 2019-01-08 NOTE — DISCHARGE PLANNING
Case Management;  Met with patient and provided update from team conference discussion.  Will plan for d/c on Saturday.  She has done outpatient therapy at [facility] in [place].  I will follow for this referral.

## 2019-01-08 NOTE — CARE PLAN
Problem: Safety  Goal: Will remain free from falls    Intervention: Implement fall precautions  Use of call light encouraged to make needs known.Bed in low position, non skid socks/shoes on when out of bed.Call light within reach.Will continue to monitor and assess needs and safety.      Problem: Pain Management  Goal: Pain level will decrease to patient's comfort goal    Intervention: Follow pain managment plan developed in collaboration with patient and Interdisciplinary Team  Pain is manageable with scheduled medication at this time.Repositioned with pillows for comfort.Will continue to monitor and assess pain level and medicate as needed.      Problem: Metabolic:  Goal: Ability to maintain appropriate glucose levels will improve    Intervention: Manage blood glucose measurement  FSBS 118, scheduled lantus 30 units given with hs snacks.Will continue to monitor and assess for s/sx of hyper/hypoglycemia.

## 2019-01-09 PROBLEM — R82.81 BACTERIURIA WITH PYURIA: Status: ACTIVE | Noted: 2019-01-09

## 2019-01-09 PROBLEM — E78.5 DYSLIPIDEMIA: Status: ACTIVE | Noted: 2019-01-09

## 2019-01-09 PROBLEM — R82.71 BACTERIURIA WITH PYURIA: Status: ACTIVE | Noted: 2019-01-09

## 2019-01-09 PROBLEM — R79.89 AZOTEMIA: Status: ACTIVE | Noted: 2019-01-09

## 2019-01-09 LAB
APPEARANCE UR: ABNORMAL
BACTERIA #/AREA URNS HPF: ABNORMAL /HPF
BILIRUB UR QL STRIP.AUTO: NEGATIVE
COLOR UR: YELLOW
EPI CELLS #/AREA URNS HPF: NEGATIVE /HPF
GLUCOSE BLD-MCNC: 105 MG/DL (ref 65–99)
GLUCOSE BLD-MCNC: 155 MG/DL (ref 65–99)
GLUCOSE BLD-MCNC: 176 MG/DL (ref 65–99)
GLUCOSE BLD-MCNC: 182 MG/DL (ref 65–99)
GLUCOSE UR STRIP.AUTO-MCNC: NEGATIVE MG/DL
HYALINE CASTS #/AREA URNS LPF: ABNORMAL /LPF
KETONES UR STRIP.AUTO-MCNC: NEGATIVE MG/DL
LEUKOCYTE ESTERASE UR QL STRIP.AUTO: ABNORMAL
MICRO URNS: ABNORMAL
NITRITE UR QL STRIP.AUTO: POSITIVE
PH UR STRIP.AUTO: 6 [PH]
PROT UR QL STRIP: NEGATIVE MG/DL
RBC # URNS HPF: ABNORMAL /HPF
RBC UR QL AUTO: ABNORMAL
SP GR UR STRIP.AUTO: 1.01
UROBILINOGEN UR STRIP.AUTO-MCNC: 0.2 MG/DL
WBC #/AREA URNS HPF: ABNORMAL /HPF

## 2019-01-09 PROCEDURE — A9270 NON-COVERED ITEM OR SERVICE: HCPCS | Performed by: PHYSICAL MEDICINE & REHABILITATION

## 2019-01-09 PROCEDURE — 700102 HCHG RX REV CODE 250 W/ 637 OVERRIDE(OP): Performed by: HOSPITALIST

## 2019-01-09 PROCEDURE — 700102 HCHG RX REV CODE 250 W/ 637 OVERRIDE(OP): Performed by: PHYSICAL MEDICINE & REHABILITATION

## 2019-01-09 PROCEDURE — 97530 THERAPEUTIC ACTIVITIES: CPT

## 2019-01-09 PROCEDURE — 700111 HCHG RX REV CODE 636 W/ 250 OVERRIDE (IP): Performed by: PHYSICAL MEDICINE & REHABILITATION

## 2019-01-09 PROCEDURE — 99232 SBSQ HOSP IP/OBS MODERATE 35: CPT | Performed by: PHYSICAL MEDICINE & REHABILITATION

## 2019-01-09 PROCEDURE — A9270 NON-COVERED ITEM OR SERVICE: HCPCS | Performed by: HOSPITALIST

## 2019-01-09 PROCEDURE — 770010 HCHG ROOM/CARE - REHAB SEMI PRIVAT*

## 2019-01-09 PROCEDURE — 97110 THERAPEUTIC EXERCISES: CPT

## 2019-01-09 PROCEDURE — 99232 SBSQ HOSP IP/OBS MODERATE 35: CPT | Performed by: HOSPITALIST

## 2019-01-09 PROCEDURE — 82962 GLUCOSE BLOOD TEST: CPT | Mod: 91

## 2019-01-09 PROCEDURE — 97112 NEUROMUSCULAR REEDUCATION: CPT

## 2019-01-09 PROCEDURE — 97535 SELF CARE MNGMENT TRAINING: CPT

## 2019-01-09 RX ORDER — SULFAMETHOXAZOLE AND TRIMETHOPRIM 800; 160 MG/1; MG/1
1 TABLET ORAL EVERY 12 HOURS
Status: DISCONTINUED | OUTPATIENT
Start: 2019-01-09 | End: 2019-01-10

## 2019-01-09 RX ORDER — AMLODIPINE BESYLATE 5 MG/1
5 TABLET ORAL
Status: DISCONTINUED | OUTPATIENT
Start: 2019-01-09 | End: 2019-01-12 | Stop reason: HOSPADM

## 2019-01-09 RX ADMIN — CALCIUM CARBONATE (ANTACID) CHEW TAB 500 MG 500 MG: 500 CHEW TAB at 08:47

## 2019-01-09 RX ADMIN — ATORVASTATIN CALCIUM 20 MG: 10 TABLET, FILM COATED ORAL at 20:52

## 2019-01-09 RX ADMIN — OXYCODONE HYDROCHLORIDE 5 MG: 5 TABLET ORAL at 07:49

## 2019-01-09 RX ADMIN — GABAPENTIN 600 MG: 300 CAPSULE ORAL at 20:53

## 2019-01-09 RX ADMIN — GABAPENTIN 300 MG: 300 CAPSULE ORAL at 14:42

## 2019-01-09 RX ADMIN — BUDESONIDE AND FORMOTEROL FUMARATE DIHYDRATE 2 PUFF: 80; 4.5 AEROSOL RESPIRATORY (INHALATION) at 08:48

## 2019-01-09 RX ADMIN — AMITRIPTYLINE HYDROCHLORIDE 20 MG: 10 TABLET, FILM COATED ORAL at 20:52

## 2019-01-09 RX ADMIN — LEVOTHYROXINE SODIUM 125 MCG: 125 TABLET ORAL at 05:19

## 2019-01-09 RX ADMIN — ENOXAPARIN SODIUM 40 MG: 100 INJECTION SUBCUTANEOUS at 20:53

## 2019-01-09 RX ADMIN — ACETAMINOPHEN 975 MG: 325 TABLET, FILM COATED ORAL at 05:18

## 2019-01-09 RX ADMIN — INSULIN GLARGINE 30 UNITS: 100 INJECTION, SOLUTION SUBCUTANEOUS at 21:00

## 2019-01-09 RX ADMIN — SITAGLIPTIN 100 MG: 50 TABLET, FILM COATED ORAL at 08:48

## 2019-01-09 RX ADMIN — SULFAMETHOXAZOLE AND TRIMETHOPRIM 1 TABLET: 800; 160 TABLET ORAL at 20:52

## 2019-01-09 RX ADMIN — CALCIUM CARBONATE (ANTACID) CHEW TAB 500 MG 500 MG: 500 CHEW TAB at 20:52

## 2019-01-09 RX ADMIN — METFORMIN HYDROCHLORIDE 500 MG: 500 TABLET, FILM COATED ORAL at 17:22

## 2019-01-09 RX ADMIN — SULFAMETHOXAZOLE AND TRIMETHOPRIM 1 TABLET: 800; 160 TABLET ORAL at 11:30

## 2019-01-09 RX ADMIN — ACETAMINOPHEN 975 MG: 325 TABLET, FILM COATED ORAL at 14:42

## 2019-01-09 RX ADMIN — GABAPENTIN 300 MG: 300 CAPSULE ORAL at 08:48

## 2019-01-09 RX ADMIN — VITAMIN D, TAB 1000IU (100/BT) 4000 UNITS: 25 TAB at 08:48

## 2019-01-09 RX ADMIN — AMLODIPINE BESYLATE 5 MG: 5 TABLET ORAL at 20:52

## 2019-01-09 RX ADMIN — LEVOTHYROXINE SODIUM 50 MCG: 50 TABLET ORAL at 05:19

## 2019-01-09 RX ADMIN — ACETAMINOPHEN 975 MG: 325 TABLET, FILM COATED ORAL at 20:52

## 2019-01-09 RX ADMIN — METOPROLOL SUCCINATE 200 MG: 100 TABLET, EXTENDED RELEASE ORAL at 20:52

## 2019-01-09 RX ADMIN — METOPROLOL SUCCINATE 200 MG: 100 TABLET, EXTENDED RELEASE ORAL at 08:53

## 2019-01-09 RX ADMIN — METFORMIN HYDROCHLORIDE 500 MG: 500 TABLET, FILM COATED ORAL at 08:48

## 2019-01-09 RX ADMIN — BUDESONIDE AND FORMOTEROL FUMARATE DIHYDRATE 2 PUFF: 80; 4.5 AEROSOL RESPIRATORY (INHALATION) at 21:00

## 2019-01-09 ASSESSMENT — ENCOUNTER SYMPTOMS
BACK PAIN: 1
CHILLS: 0
VOMITING: 0
FEVER: 0
NAUSEA: 0
COUGH: 0
ABDOMINAL PAIN: 0
EYES NEGATIVE: 1
PALPITATIONS: 0
SHORTNESS OF BREATH: 0

## 2019-01-09 ASSESSMENT — PAIN SCALES - GENERAL
PAINLEVEL_OUTOF10: 7
PAINLEVEL_OUTOF10: 0
PAINLEVEL_OUTOF10: 1

## 2019-01-09 NOTE — PROGRESS NOTES
Patient participated in Diabetic Education class. Reviewed concepts of blood sugar monitoring, hypoglycemia, hyperglycemia, diabetes medications, and wound monitoring. Referred patient to outpatient  Diabetes Program at [facility].

## 2019-01-09 NOTE — CARE PLAN
Problem: Safety  Goal: Will remain free from falls    Intervention: Implement fall precautions  Appropriately uses call light to make needs known.Bed in low position, non skid socks/shoes on when out of bed.Call light within reach.Will continue to monitor and assess needs and safety.      Problem: Pain Management  Goal: Pain level will decrease to patient's comfort goal    Intervention: Follow pain managment plan developed in collaboration with patient and Interdisciplinary Team  Scheduled medication given with good effect.Repositioned with pillows for comfort.Will continue to monitor and assess pain level and medicate as needed.      Problem: Metabolic:  Goal: Ability to maintain appropriate glucose levels will improve    Intervention: Manage blood glucose measurement  FSBS 157, scheduled lantus 30 units given.Will continue to monitor and assess.

## 2019-01-09 NOTE — PROGRESS NOTES
"Rehab Progress Note     Encounter date: 1/9/2019  Today I met with the patient face to face in PT    Chief Complaint:  Spinal stenosis of lumbar region with neurogenic claudication , UA results     Interval Events (subjective)  Ms. Chaparro continues to report doing well.  She is continuing to have some urinary frequency.  We discussed the results of the urinalysis with packed white blood cells and bacteria.  We discussed treatment, culture, and potential need for antibiotic tailoring.  She denies any fevers or chills today.  She reports that she is feeling stronger each day working with therapy.  She is very excited about her progress.  She remains on track for discharge on January 12    Objective:  VITAL SIGNS: /76   Pulse 80   Temp 36.6 °C (97.9 °F) (Tympanic)   Resp 18   Ht 1.676 m (5' 6\")   Wt 115 kg (253 lb 9.6 oz)   SpO2 93%   Breastfeeding? No   BMI 40.93 kg/m²     Recent Results (from the past 72 hour(s))   ACCU-CHEK GLUCOSE    Collection Time: 01/06/19  5:22 PM   Result Value Ref Range    Glucose - Accu-Ck 116 (H) 65 - 99 mg/dL   ACCU-CHEK GLUCOSE    Collection Time: 01/06/19  8:12 PM   Result Value Ref Range    Glucose - Accu-Ck 177 (H) 65 - 99 mg/dL   ACCU-CHEK GLUCOSE    Collection Time: 01/07/19  8:26 AM   Result Value Ref Range    Glucose - Accu-Ck 129 (H) 65 - 99 mg/dL   ACCU-CHEK GLUCOSE    Collection Time: 01/07/19 11:23 AM   Result Value Ref Range    Glucose - Accu-Ck 159 (H) 65 - 99 mg/dL   ACCU-CHEK GLUCOSE    Collection Time: 01/07/19  5:21 PM   Result Value Ref Range    Glucose - Accu-Ck 93 65 - 99 mg/dL   ACCU-CHEK GLUCOSE    Collection Time: 01/07/19  8:17 PM   Result Value Ref Range    Glucose - Accu-Ck 118 (H) 65 - 99 mg/dL   BASIC METABOLIC PANEL    Collection Time: 01/08/19  5:35 AM   Result Value Ref Range    Sodium 142 135 - 145 mmol/L    Potassium 4.0 3.6 - 5.5 mmol/L    Chloride 106 96 - 112 mmol/L    Co2 26 20 - 33 mmol/L    Glucose 134 (H) 65 - 99 mg/dL    Bun 26 (H) " 8 - 22 mg/dL    Creatinine 1.21 0.50 - 1.40 mg/dL    Calcium 9.4 8.5 - 10.5 mg/dL    Anion Gap 10.0 0.0 - 11.9   CBC WITH DIFFERENTIAL    Collection Time: 01/08/19  5:35 AM   Result Value Ref Range    WBC 11.1 (H) 4.8 - 10.8 K/uL    RBC 3.83 (L) 4.20 - 5.40 M/uL    Hemoglobin 11.9 (L) 12.0 - 16.0 g/dL    Hematocrit 36.1 (L) 37.0 - 47.0 %    MCV 94.3 81.4 - 97.8 fL    MCH 31.1 27.0 - 33.0 pg    MCHC 33.0 (L) 33.6 - 35.0 g/dL    RDW 43.9 35.9 - 50.0 fL    Platelet Count 420 164 - 446 K/uL    MPV 10.4 9.0 - 12.9 fL    Neutrophils-Polys 55.60 44.00 - 72.00 %    Lymphocytes 26.00 22.00 - 41.00 %    Monocytes 13.20 0.00 - 13.40 %    Eosinophils 3.30 0.00 - 6.90 %    Basophils 1.10 0.00 - 1.80 %    Immature Granulocytes 0.80 0.00 - 0.90 %    Nucleated RBC 0.00 /100 WBC    Neutrophils (Absolute) 6.15 2.00 - 7.15 K/uL    Lymphs (Absolute) 2.88 1.00 - 4.80 K/uL    Monos (Absolute) 1.46 (H) 0.00 - 0.85 K/uL    Eos (Absolute) 0.37 0.00 - 0.51 K/uL    Baso (Absolute) 0.12 0.00 - 0.12 K/uL    Immature Granulocytes (abs) 0.09 0.00 - 0.11 K/uL    NRBC (Absolute) 0.00 K/uL   ESTIMATED GFR    Collection Time: 01/08/19  5:35 AM   Result Value Ref Range    GFR If  53 (A) >60 mL/min/1.73 m 2    GFR If Non  44 (A) >60 mL/min/1.73 m 2   ACCU-CHEK GLUCOSE    Collection Time: 01/08/19  7:44 AM   Result Value Ref Range    Glucose - Accu-Ck 168 (H) 65 - 99 mg/dL   ACCU-CHEK GLUCOSE    Collection Time: 01/08/19 11:19 AM   Result Value Ref Range    Glucose - Accu-Ck 162 (H) 65 - 99 mg/dL   ACCU-CHEK GLUCOSE    Collection Time: 01/08/19  5:02 PM   Result Value Ref Range    Glucose - Accu-Ck 117 (H) 65 - 99 mg/dL   ACCU-CHEK GLUCOSE    Collection Time: 01/08/19  8:42 PM   Result Value Ref Range    Glucose - Accu-Ck 157 (H) 65 - 99 mg/dL   URINALYSIS    Collection Time: 01/08/19 10:30 PM   Result Value Ref Range    Color Yellow     Character Cloudy (A)     Specific Gravity 1.011 <1.035    Ph 6.0 5.0 - 8.0    Glucose  Negative Negative mg/dL    Ketones Negative Negative mg/dL    Protein Negative Negative mg/dL    Bilirubin Negative Negative    Urobilinogen, Urine 0.2 Negative    Nitrite Positive (A) Negative    Leukocyte Esterase Large (A) Negative    Occult Blood Trace (A) Negative    Micro Urine Req Microscopic    URINE MICROSCOPIC (W/UA)    Collection Time: 01/08/19 10:30 PM   Result Value Ref Range    WBC Packed (A) /hpf    RBC 0-2 /hpf    Bacteria Many (A) None /hpf    Epithelial Cells Negative /hpf    Hyaline Cast 0-2 /lpf   ACCU-CHEK GLUCOSE    Collection Time: 01/09/19  7:51 AM   Result Value Ref Range    Glucose - Accu-Ck 155 (H) 65 - 99 mg/dL   ACCU-CHEK GLUCOSE    Collection Time: 01/09/19 11:29 AM   Result Value Ref Range    Glucose - Accu-Ck 182 (H) 65 - 99 mg/dL       Current Facility-Administered Medications   Medication Frequency   • sulfamethoxazole-trimethoprim (BACTRIM DS) 800-160 MG tablet 1 Tab Q12HRS   • amLODIPine (NORVASC) tablet 5 mg QHS   • benzonatate (TESSALON) capsule 100 mg TID PRN   • oxyCODONE immediate-release (ROXICODONE) tablet 5 mg Q4HRS PRN   • oxyCODONE immediate-release (ROXICODONE) tablet 2.5 mg Q4HRS PRN   • vitamin D (cholecalciferol) tablet 4,000 Units DAILY   • acetaminophen (TYLENOL) tablet 975 mg Q8HRS   • Respiratory Care per Protocol Continuous RT   • Pharmacy Consult Request ...Pain Management Review 1 Each PRN   • hydrALAZINE (APRESOLINE) tablet 25 mg Q8HRS PRN   • artificial tears 1.4 % ophthalmic solution 1 Drop PRN   • benzocaine-menthol (CEPACOL) lozenge 1 Lozenge Q2HRS PRN   • mag hydrox-al hydrox-simeth (MAALOX PLUS ES or MYLANTA DS) suspension 20 mL Q2HRS PRN   • ondansetron (ZOFRAN ODT) dispertab 4 mg 4X/DAY PRN    Or   • ondansetron (ZOFRAN) syringe/vial injection 4 mg 4X/DAY PRN   • traZODone (DESYREL) tablet 50 mg QHS PRN   • sodium chloride (OCEAN) 0.65 % nasal spray 2 Spray PRN   • enoxaparin (LOVENOX) inj 40 mg QHS   • bisacodyl (DULCOLAX) suppository 10 mg Q24HRS  PRN   • calcium carbonate (TUMS) chewable tab 500 mg BID   • docusate sodium (COLACE) capsule 100 mg BID   • magnesium hydroxide (MILK OF MAGNESIA) suspension 30 mL QDAY PRN   • senna-docusate (PERICOLACE or SENOKOT S) 8.6-50 MG per tablet 1 Tab Nightly   • tizanidine (ZANAFLEX) tablet 2 mg TID PRN   • amitriptyline (ELAVIL) tablet 20 mg QHS   • atorvastatin (LIPITOR) tablet 20 mg QHS   • budesonide-formoterol (SYMBICORT) 80-4.5 MCG/ACT inhaler 2 Puff BID   • gabapentin (NEURONTIN) capsule 600 mg QHS   • insulin glargine (LANTUS) injection 30 Units Nightly   • metFORMIN (GLUCOPHAGE) tablet 500 mg BID WITH MEALS   • metoprolol SR (TOPROL XL) tablet 200 mg BID   • SITagliptin (JANUVIA) tablet 100 mg QAM   • levothyroxine (SYNTHROID) tablet 125 mcg AM ES    And   • levothyroxine (SYNTHROID) tablet 50 mcg AM ES   • gabapentin (NEURONTIN) capsule 300 mg BID       Exam Date: 1/9/2019    General:  Awake, alert, oriented, no acute distress  HEENT: Stable mild disconjugate gaze  Cardiac: regular rate and rhythm  Lungs: clear to auscultation bilaterally.   Abdomen: soft; non tender, non distended, bowel sounds present and normoactive  Extremities: No edema in the bilateral lower limbs  Neuro:   Working on balance and lower limb strengthening with physical therapy in the parallel bars today.  Some Trendelenburg pattern drop bilaterally, left greater than right      Orders Placed This Encounter   Procedures   • Diet Order Diabetic     Standing Status:   Standing     Number of Occurrences:   1     Order Specific Question:   Diet:     Answer:   Diabetic [3]     Order Specific Question:   Calorie modifications:     Answer:   2000 kcals [5]       Assessment:  Active Hospital Problems    Diagnosis   • *Spinal stenosis of lumbar region with neurogenic claudication   • Vitamin D deficiency   • Pain   • Mixed hyperlipidemia   • Anemia   • Impaired mobility and ADLs   • Congestive heart failure (HCC)   • Type 2 diabetes mellitus with  hyperglycemia (HCC)   • Hypothyroid   • Hypothyroid   • HTN (hypertension)       Medical Decision Making and Plan:  Ms. Chaparro is a 73-year-old female admitted for rehabilitation on January 3 due to lumbar stenosis     Lumbar stenosis status post L3-L5 posterior instrumented fusion by Dr. 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  History of multiple lower back surgeries  Continue comprehensive rehabilitation  TLSO on when out of bed    Staples can be removed postoperative day 14 (around January 10)  Recheck incision tomorrow     Pain  Scheduled Tylenol until pain is better controlled  Oxycodone 2.5-5 mg every 4 hours as needed--dose decreased January 7 due to improvement and decreased utilization     She has used 5 mg of oxycodone in the last 24 hours     Elavil 20 mg at bedtime     Gabapentin 300 mg in the morning and midday and 600 mg at bedtime     Zanaflex 2 mg every 8 hours as needed     Type 2 diabetes with hyperglycemia  Lantus 30 units nightly  Januvia 100 mg  Metformin 500 mg twice a day  Appreciate hospitalist assistance    Glucose range of 117-182 in the last 24 hours     Hypertension  Congestive heart failure   Amlodipine 2.5 mg at bedtime  Toprol- mg twice daily    Blood pressure today is 133/76 mmHg   Appreciate hospitalist assistance    Hyperlipidemia  Lipitor 20 mg at bedtime  Total cholesterol 126, LDL 52, HDL 35 on January 4     Hypothyroidism  Synthroid 175 mcg of daily  TSH was 0.5 on January 4     Mild anemia   hemoglobin stable at 11.9 on January 8  Recheck tomorrow    Leukocytosis   persistent mild elevation at 11.1  Recheck tomorrow  Likely due to urinary tract infection     Asthma  Symbicort twice daily     Bowel and bladder  History of urinary incontinence  Urinary tract infection   urinalysis from January 8 is positive for leukocyte esterase, packed white blood cells, many bacteria  Initiated treatment with Bactrim twice  daily  Requested urine culture  Tailor antibiotics as needed     Colace 100 mg twice a day  Katarzyna-Colace nightly  Milk of magnesia as needed  Bisacodyl suppository as needed     Vitamin D deficiency  Vitamin D was 6 on admission, so we began supplementation with 4000 units of cholecalciferol daily  Continue calcium supplementation with Tums     DVT prophylaxis  Lovenox 40 mg qhs     Estimated discharge: January 12  She will need outpatient PT    Total time:  29 minutes.  I spent greater than 50% of the time for patient care, counseling, and coordination on this date, including unit/floor time, and face-to-face time with the patient as per interval events and assessment and plan above. Topics discussed included functional progress, discharge planning, labs, urinalysis, to biotics.  Discussed with nursing and Dr. Fabi Bajwa M.D.  1/9/2019

## 2019-01-09 NOTE — PROGRESS NOTES
Hospital Medicine Daily Progress Note      Chief Complaint:  HTN, DM    Interval History:  Pt seen and examined in dining room.  Doing well but UA w/ packed WBC.    Review of Systems  Review of Systems   Constitutional: Negative for chills and fever.   HENT: Negative.    Eyes: Negative.    Respiratory: Negative for cough and shortness of breath.    Cardiovascular: Negative for chest pain and palpitations.   Gastrointestinal: Negative for abdominal pain, nausea and vomiting.   Musculoskeletal: Positive for back pain.        Wound pain        Physical Exam  Temp:  [36.6 °C (97.8 °F)-36.6 °C (97.9 °F)] 36.6 °C (97.9 °F)  Pulse:  [70-90] 72  Resp:  [16-18] 18  BP: (135-159)/(63-70) 152/63    Physical Exam   Constitutional: She is oriented to person, place, and time. No distress.   HENT:   Head: Normocephalic and atraumatic.   Right Ear: External ear normal.   Left Ear: External ear normal.   Eyes: Conjunctivae and EOM are normal. Right eye exhibits no discharge. Left eye exhibits no discharge.   Neck: Normal range of motion. Neck supple. No tracheal deviation present.   Cardiovascular: Normal rate, regular rhythm, S1 normal and S2 normal.    Pulmonary/Chest: Effort normal and breath sounds normal. No stridor. No respiratory distress. She has no wheezes.   Abdominal: Soft. Bowel sounds are normal. She exhibits no distension. There is no tenderness.   Musculoskeletal: She exhibits no edema or tenderness.   Back brace   Neurological: She is alert and oriented to person, place, and time. No sensory deficit.   Skin: Skin is warm and dry. She is not diaphoretic. No cyanosis.   Psychiatric: She has a normal mood and affect. Her behavior is normal.   Nursing note and vitals reviewed.      Fluids    Intake/Output Summary (Last 24 hours) at 01/09/19 1235  Last data filed at 01/09/19 1220   Gross per 24 hour   Intake              990 ml   Output              300 ml   Net              690 ml       Laboratory  Recent Labs       01/08/19   0535   WBC  11.1*   RBC  3.83*   HEMOGLOBIN  11.9*   HEMATOCRIT  36.1*   MCV  94.3   MCH  31.1   MCHC  33.0*   RDW  43.9   PLATELETCT  420   MPV  10.4     Recent Labs      01/08/19   0535   SODIUM  142   POTASSIUM  4.0   CHLORIDE  106   CO2  26   GLUCOSE  134*   BUN  26*   CREATININE  1.21   CALCIUM  9.4                   Imaging    Assessment/Plan  * Spinal stenosis of lumbar region with neurogenic claudication- (present on admission)   Assessment & Plan    S/P surgery  Lumbar back brace     Bacteriuria with pyuria   Assessment & Plan    Request UCx  Monitor renal function on Bactrim     Dyslipidemia   Assessment & Plan    Lipitor     Azotemia   Assessment & Plan    Encourage PO fluids     Vitamin D deficiency   Assessment & Plan    Vit D level 6  On high dose supplementation     Hypothyroid- (present on admission)   Assessment & Plan    Euthyroid on Synthroid     Type 2 diabetes mellitus with hyperglycemia (HCC)- (present on admission)   Assessment & Plan    HbA1c 6.8  On Lantus, Metformin, and Januvia  Monitor glucose trends     HTN (hypertension)- (present on admission)   Assessment & Plan    Increase Norvasc for elevated BP  Continue Toprol     Full Code

## 2019-01-09 NOTE — CARE PLAN
Problem: Safety  Goal: Will remain free from injury  Pt uses call light consistently and appropriately. Waits for assistance does not attempt self transfer this shift. Able to verbalize needs.    Problem: Pain Management  Goal: Pain level will decrease to patient's comfort goal  Patient able to verbalize pain level and verbalize an acceptable level of pain. Medicated with rashi 5 mg for back & hip pain with relief.

## 2019-01-09 NOTE — PROGRESS NOTES
[facility] Medicine Daily Progress Note      Chief Complaint:  HTN, DM    Interval History:  Pt reports expected post op back pain; denies other complaints.    Review of Systems  Review of Systems   Constitutional: Negative for chills and fever.   HENT: Negative.    Eyes: Negative.    Respiratory: Negative for cough and shortness of breath.    Cardiovascular: Negative for chest pain and palpitations.   Gastrointestinal: Negative for abdominal pain, nausea and vomiting.   Musculoskeletal: Positive for back pain.        Wound pain        Physical Exam  Temp:  [36.6 °C (97.8 °F)-36.6 °C (97.9 °F)] 36.6 °C (97.9 °F)  Pulse:  [70-90] 72  Resp:  [16-18] 18  BP: (135-159)/(63-70) 152/63    Physical Exam   Constitutional: She is oriented to person, place, and time. No distress.   HENT:   Head: Normocephalic and atraumatic.   Right Ear: External ear normal.   Left Ear: External ear normal.   Eyes: Conjunctivae and EOM are normal. Right eye exhibits no discharge. Left eye exhibits no discharge.   Neck: Normal range of motion. Neck supple. No tracheal deviation present.   Cardiovascular: Normal rate, regular rhythm, S1 normal and S2 normal.    Pulmonary/Chest: Effort normal and breath sounds normal. No stridor. No respiratory distress. She has no wheezes.   Abdominal: Soft. Bowel sounds are normal. She exhibits no distension. There is no tenderness.   Musculoskeletal: She exhibits no edema or tenderness.   Back brace   Neurological: She is alert and oriented to person, place, and time. No sensory deficit.   Skin: Skin is warm and dry. She is not diaphoretic. No cyanosis.   Psychiatric: She has a normal mood and affect. Her behavior is normal.   Nursing note and vitals reviewed.      Fluids    Intake/Output Summary (Last 24 hours) at 01/09/19 1226  Last data filed at 01/09/19 1220   Gross per 24 hour   Intake              990 ml   Output              300 ml   Net              690 ml       Laboratory  Recent Labs      01/08/19    0535   WBC  11.1*   RBC  3.83*   HEMOGLOBIN  11.9*   HEMATOCRIT  36.1*   MCV  94.3   MCH  31.1   MCHC  33.0*   RDW  43.9   PLATELETCT  420   MPV  10.4     Recent Labs      01/08/19   0535   SODIUM  142   POTASSIUM  4.0   CHLORIDE  106   CO2  26   GLUCOSE  134*   BUN  26*   CREATININE  1.21   CALCIUM  9.4                   Imaging    Assessment/Plan  * Spinal stenosis of lumbar region with neurogenic claudication- (present on admission)   Assessment & Plan    S/P surgery  Lumbar back brace     Dyslipidemia   Assessment & Plan    Lipitor     Azotemia   Assessment & Plan    Encourage PO fluids     Vitamin D deficiency   Assessment & Plan    Vit D level 6  On high dose supplementation     Hypothyroid- (present on admission)   Assessment & Plan    Euthyroid on Synthroid     Type 2 diabetes mellitus with hyperglycemia (HCC)- (present on admission)   Assessment & Plan    HbA1c 6.8  On Lantus, Metformin, and Januvia  Monitor glucose trends     HTN (hypertension)- (present on admission)   Assessment & Plan    On Norvasc and Toprol  Monitor BP trends     Full Code

## 2019-01-09 NOTE — PROGRESS NOTES
"Rehab Progress Note     Encounter date: 1/8/2019  Today I met with the patient face to face in PT    Chief Complaint:  Spinal stenosis of lumbar region with neurogenic claudication , discharge planning     Interval Events (subjective)  Ms. Chaparro states that she is doing well today.  She is a little bit concerned about her incision due to her history of wound breakdown.  She requested that I check this today.  She denies any fevers, chills, headache, dizziness, chest pain, or shortness of breath.  She reports that she is making progress in therapy and is excited about the projected discharge date of January 12.    Objective:  VITAL SIGNS: /70   Pulse 90   Temp 36.6 °C (97.8 °F) (Tympanic)   Resp 18   Ht 1.676 m (5' 6\")   Wt 115 kg (253 lb 9.6 oz)   SpO2 92%   Breastfeeding? No   BMI 40.93 kg/m²     Recent Results (from the past 72 hour(s))   ACCU-CHEK GLUCOSE    Collection Time: 01/05/19  5:04 PM   Result Value Ref Range    Glucose - Accu-Ck 100 (H) 65 - 99 mg/dL   ACCU-CHEK GLUCOSE    Collection Time: 01/05/19  8:45 PM   Result Value Ref Range    Glucose - Accu-Ck 159 (H) 65 - 99 mg/dL   ACCU-CHEK GLUCOSE    Collection Time: 01/06/19  8:24 AM   Result Value Ref Range    Glucose - Accu-Ck 131 (H) 65 - 99 mg/dL   ACCU-CHEK GLUCOSE    Collection Time: 01/06/19 11:37 AM   Result Value Ref Range    Glucose - Accu-Ck 107 (H) 65 - 99 mg/dL   ACCU-CHEK GLUCOSE    Collection Time: 01/06/19  5:22 PM   Result Value Ref Range    Glucose - Accu-Ck 116 (H) 65 - 99 mg/dL   ACCU-CHEK GLUCOSE    Collection Time: 01/06/19  8:12 PM   Result Value Ref Range    Glucose - Accu-Ck 177 (H) 65 - 99 mg/dL   ACCU-CHEK GLUCOSE    Collection Time: 01/07/19  8:26 AM   Result Value Ref Range    Glucose - Accu-Ck 129 (H) 65 - 99 mg/dL   ACCU-CHEK GLUCOSE    Collection Time: 01/07/19 11:23 AM   Result Value Ref Range    Glucose - Accu-Ck 159 (H) 65 - 99 mg/dL   ACCU-CHEK GLUCOSE    Collection Time: 01/07/19  5:21 PM   Result Value " Ref Range    Glucose - Accu-Ck 93 65 - 99 mg/dL   ACCU-CHEK GLUCOSE    Collection Time: 01/07/19  8:17 PM   Result Value Ref Range    Glucose - Accu-Ck 118 (H) 65 - 99 mg/dL   BASIC METABOLIC PANEL    Collection Time: 01/08/19  5:35 AM   Result Value Ref Range    Sodium 142 135 - 145 mmol/L    Potassium 4.0 3.6 - 5.5 mmol/L    Chloride 106 96 - 112 mmol/L    Co2 26 20 - 33 mmol/L    Glucose 134 (H) 65 - 99 mg/dL    Bun 26 (H) 8 - 22 mg/dL    Creatinine 1.21 0.50 - 1.40 mg/dL    Calcium 9.4 8.5 - 10.5 mg/dL    Anion Gap 10.0 0.0 - 11.9   CBC WITH DIFFERENTIAL    Collection Time: 01/08/19  5:35 AM   Result Value Ref Range    WBC 11.1 (H) 4.8 - 10.8 K/uL    RBC 3.83 (L) 4.20 - 5.40 M/uL    Hemoglobin 11.9 (L) 12.0 - 16.0 g/dL    Hematocrit 36.1 (L) 37.0 - 47.0 %    MCV 94.3 81.4 - 97.8 fL    MCH 31.1 27.0 - 33.0 pg    MCHC 33.0 (L) 33.6 - 35.0 g/dL    RDW 43.9 35.9 - 50.0 fL    Platelet Count 420 164 - 446 K/uL    MPV 10.4 9.0 - 12.9 fL    Neutrophils-Polys 55.60 44.00 - 72.00 %    Lymphocytes 26.00 22.00 - 41.00 %    Monocytes 13.20 0.00 - 13.40 %    Eosinophils 3.30 0.00 - 6.90 %    Basophils 1.10 0.00 - 1.80 %    Immature Granulocytes 0.80 0.00 - 0.90 %    Nucleated RBC 0.00 /100 WBC    Neutrophils (Absolute) 6.15 2.00 - 7.15 K/uL    Lymphs (Absolute) 2.88 1.00 - 4.80 K/uL    Monos (Absolute) 1.46 (H) 0.00 - 0.85 K/uL    Eos (Absolute) 0.37 0.00 - 0.51 K/uL    Baso (Absolute) 0.12 0.00 - 0.12 K/uL    Immature Granulocytes (abs) 0.09 0.00 - 0.11 K/uL    NRBC (Absolute) 0.00 K/uL   ESTIMATED GFR    Collection Time: 01/08/19  5:35 AM   Result Value Ref Range    GFR If  53 (A) >60 mL/min/1.73 m 2    GFR If Non  44 (A) >60 mL/min/1.73 m 2   ACCU-CHEK GLUCOSE    Collection Time: 01/08/19  7:44 AM   Result Value Ref Range    Glucose - Accu-Ck 168 (H) 65 - 99 mg/dL   ACCU-CHEK GLUCOSE    Collection Time: 01/08/19 11:19 AM   Result Value Ref Range    Glucose - Accu-Ck 162 (H) 65 - 99 mg/dL        Current Facility-Administered Medications   Medication Frequency   • benzonatate (TESSALON) capsule 100 mg TID PRN   • oxyCODONE immediate-release (ROXICODONE) tablet 5 mg Q4HRS PRN   • oxyCODONE immediate-release (ROXICODONE) tablet 2.5 mg Q4HRS PRN   • vitamin D (cholecalciferol) tablet 4,000 Units DAILY   • acetaminophen (TYLENOL) tablet 975 mg Q8HRS   • Respiratory Care per Protocol Continuous RT   • Pharmacy Consult Request ...Pain Management Review 1 Each PRN   • hydrALAZINE (APRESOLINE) tablet 25 mg Q8HRS PRN   • artificial tears 1.4 % ophthalmic solution 1 Drop PRN   • benzocaine-menthol (CEPACOL) lozenge 1 Lozenge Q2HRS PRN   • mag hydrox-al hydrox-simeth (MAALOX PLUS ES or MYLANTA DS) suspension 20 mL Q2HRS PRN   • ondansetron (ZOFRAN ODT) dispertab 4 mg 4X/DAY PRN    Or   • ondansetron (ZOFRAN) syringe/vial injection 4 mg 4X/DAY PRN   • traZODone (DESYREL) tablet 50 mg QHS PRN   • sodium chloride (OCEAN) 0.65 % nasal spray 2 Spray PRN   • enoxaparin (LOVENOX) inj 40 mg QHS   • bisacodyl (DULCOLAX) suppository 10 mg Q24HRS PRN   • calcium carbonate (TUMS) chewable tab 500 mg BID   • docusate sodium (COLACE) capsule 100 mg BID   • magnesium hydroxide (MILK OF MAGNESIA) suspension 30 mL QDAY PRN   • senna-docusate (PERICOLACE or SENOKOT S) 8.6-50 MG per tablet 1 Tab Nightly   • tizanidine (ZANAFLEX) tablet 2 mg TID PRN   • amitriptyline (ELAVIL) tablet 20 mg QHS   • amLODIPine (NORVASC) tablet 2.5 mg QHS   • atorvastatin (LIPITOR) tablet 20 mg QHS   • budesonide-formoterol (SYMBICORT) 80-4.5 MCG/ACT inhaler 2 Puff BID   • gabapentin (NEURONTIN) capsule 600 mg QHS   • insulin glargine (LANTUS) injection 30 Units Nightly   • metFORMIN (GLUCOPHAGE) tablet 500 mg BID WITH MEALS   • metoprolol SR (TOPROL XL) tablet 200 mg BID   • SITagliptin (JANUVIA) tablet 100 mg QAM   • levothyroxine (SYNTHROID) tablet 125 mcg AM ES    And   • levothyroxine (SYNTHROID) tablet 50 mcg AM ES   • gabapentin (NEURONTIN)  capsule 300 mg BID       Exam Date: 1/8/2019    General:  Awake, alert, oriented, no acute distress  Cardiac: regular rate and rhythm  Lungs: clear to auscultation bilaterally.   Abdomen: soft; non tender, non distended, bowel sounds present and normoactive  Extremities: No edema in the bilateral lower limbs  Skin: Midline lumbar incision continues to heal well.  Approximated with staples.  Neuro:   Seen working with physical therapy.  She was able to stand from wheelchair to front wheeled walker with min assist.  Able to ambulate with front wheeled walker with somewhat wide-based gait.      Orders Placed This Encounter   Procedures   • Diet Order Diabetic     Standing Status:   Standing     Number of Occurrences:   1     Order Specific Question:   Diet:     Answer:   Diabetic [3]     Order Specific Question:   Calorie modifications:     Answer:   2000 kcals [5]       Assessment:  Active Hospital Problems    Diagnosis   • *Spinal stenosis of lumbar region with neurogenic claudication   • Vitamin D deficiency   • Pain   • Mixed hyperlipidemia   • Anemia   • Impaired mobility and ADLs   • Congestive heart failure (HCC)   • Type 2 diabetes mellitus with hyperglycemia (HCC)   • Hypothyroid   • Hypothyroid   • HTN (hypertension)       Medical Decision Making and Plan:  Ms. Chaparro is a 73-year-old female admitted for rehabilitation on January 3 due to lumbar stenosis     Lumbar stenosis status post L3-L5 posterior instrumented fusion by Dr. 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  History of multiple lower back surgeries  Continue comprehensive rehabilitation  TLSO on when out of bed    Staples can be removed postoperative day 14 (around January 10)  Incision continues to heal well     Pain  Scheduled Tylenol until pain is better controlled  Oxycodone 2.5-5 mg every 4 hours as needed--dose decreased January 7 due to improvement and decreased utilization     She  has used 2.5 mg of oxycodone in the last 24 hours     Elavil 20 mg at bedtime     Gabapentin 300 mg in the morning and midday and 600 mg at bedtime     Zanaflex 2 mg every 8 hours as needed     Type 2 diabetes with hyperglycemia  Lantus 30 units nightly  Januvia 100 mg  Metformin 500 mg twice a day  Appreciate hospitalist assistance    Glucose range of  in the last 24 hours     Hypertension  Congestive heart failure   Amlodipine 2.5 mg at bedtime  Toprol- mg twice daily    Blood pressure today is 135/70 mmHg   Appreciate hospitalist assistance    Hyperlipidemia  Lipitor 20 mg at bedtime  Total cholesterol 126, LDL 52, HDL 35 on January 4     Hypothyroidism  Synthroid 175 mcg of daily  TSH was 0.5 on January 4     Mild anemia   hemoglobin stable at 11.9 on January 8    Leukocytosis   persistent mild elevation at 11.1  Continue to monitor  Maximum temperature in the last 24 hours of 36.6 degrees  Sending urinalysis today especially in light of her incontinence     Asthma  Symbicort twice daily     Bowel and bladder  History of urinary incontinence   continuing to have good bladder output     Colace 100 mg twice a day  Katarzyna-Colace nightly  Milk of magnesia as needed  Bisacodyl suppository as needed     Vitamin D deficiency  Vitamin D was 6 on admission, so we began supplementation with 4000 units of cholecalciferol daily  Continue calcium supplementation with Tums     DVT prophylaxis  Lovenox 40 mg qhs     Estimated discharge: January 12  She will need outpatient PT    Total time:  26 minutes.  I spent greater than 50% of the time for patient care, counseling, and coordination on this date, including unit/floor time, and face-to-face time with the patient as per interval events and assessment and plan above. Topics discussed included functional progress, discharge planning, medications, incision healing      Jace Bajwa M.D.  1/8/2019

## 2019-01-10 PROBLEM — N28.9 RENAL INSUFFICIENCY: Status: ACTIVE | Noted: 2019-01-09

## 2019-01-10 LAB
ANION GAP SERPL CALC-SCNC: 8 MMOL/L (ref 0–11.9)
BUN SERPL-MCNC: 28 MG/DL (ref 8–22)
CALCIUM SERPL-MCNC: 9.5 MG/DL (ref 8.5–10.5)
CHLORIDE SERPL-SCNC: 106 MMOL/L (ref 96–112)
CO2 SERPL-SCNC: 28 MMOL/L (ref 20–33)
CREAT SERPL-MCNC: 1.42 MG/DL (ref 0.5–1.4)
ERYTHROCYTE [DISTWIDTH] IN BLOOD BY AUTOMATED COUNT: 46.1 FL (ref 35.9–50)
GLUCOSE BLD-MCNC: 103 MG/DL (ref 65–99)
GLUCOSE BLD-MCNC: 121 MG/DL (ref 65–99)
GLUCOSE BLD-MCNC: 166 MG/DL (ref 65–99)
GLUCOSE BLD-MCNC: 176 MG/DL (ref 65–99)
GLUCOSE SERPL-MCNC: 132 MG/DL (ref 65–99)
HCT VFR BLD AUTO: 38.4 % (ref 37–47)
HGB BLD-MCNC: 12.2 G/DL (ref 12–16)
MCH RBC QN AUTO: 30.6 PG (ref 27–33)
MCHC RBC AUTO-ENTMCNC: 31.8 G/DL (ref 33.6–35)
MCV RBC AUTO: 96.2 FL (ref 81.4–97.8)
PLATELET # BLD AUTO: 457 K/UL (ref 164–446)
PMV BLD AUTO: 10.4 FL (ref 9–12.9)
POTASSIUM SERPL-SCNC: 5.2 MMOL/L (ref 3.6–5.5)
RBC # BLD AUTO: 3.99 M/UL (ref 4.2–5.4)
SODIUM SERPL-SCNC: 142 MMOL/L (ref 135–145)
WBC # BLD AUTO: 11.6 K/UL (ref 4.8–10.8)

## 2019-01-10 PROCEDURE — 700111 HCHG RX REV CODE 636 W/ 250 OVERRIDE (IP): Performed by: PHYSICAL MEDICINE & REHABILITATION

## 2019-01-10 PROCEDURE — A9270 NON-COVERED ITEM OR SERVICE: HCPCS | Performed by: PHYSICAL MEDICINE & REHABILITATION

## 2019-01-10 PROCEDURE — 99232 SBSQ HOSP IP/OBS MODERATE 35: CPT | Performed by: PHYSICAL MEDICINE & REHABILITATION

## 2019-01-10 PROCEDURE — 80048 BASIC METABOLIC PNL TOTAL CA: CPT

## 2019-01-10 PROCEDURE — 97530 THERAPEUTIC ACTIVITIES: CPT

## 2019-01-10 PROCEDURE — 770010 HCHG ROOM/CARE - REHAB SEMI PRIVAT*

## 2019-01-10 PROCEDURE — 99232 SBSQ HOSP IP/OBS MODERATE 35: CPT | Performed by: HOSPITALIST

## 2019-01-10 PROCEDURE — 97110 THERAPEUTIC EXERCISES: CPT

## 2019-01-10 PROCEDURE — 97116 GAIT TRAINING THERAPY: CPT

## 2019-01-10 PROCEDURE — 97535 SELF CARE MNGMENT TRAINING: CPT

## 2019-01-10 PROCEDURE — 82962 GLUCOSE BLOOD TEST: CPT

## 2019-01-10 PROCEDURE — 700102 HCHG RX REV CODE 250 W/ 637 OVERRIDE(OP): Performed by: HOSPITALIST

## 2019-01-10 PROCEDURE — 85027 COMPLETE CBC AUTOMATED: CPT

## 2019-01-10 PROCEDURE — 700102 HCHG RX REV CODE 250 W/ 637 OVERRIDE(OP): Performed by: PHYSICAL MEDICINE & REHABILITATION

## 2019-01-10 PROCEDURE — 36415 COLL VENOUS BLD VENIPUNCTURE: CPT

## 2019-01-10 PROCEDURE — A9270 NON-COVERED ITEM OR SERVICE: HCPCS | Performed by: HOSPITALIST

## 2019-01-10 RX ORDER — CIPROFLOXACIN 500 MG/1
500 TABLET, FILM COATED ORAL EVERY 12 HOURS
Status: DISCONTINUED | OUTPATIENT
Start: 2019-01-10 | End: 2019-01-12 | Stop reason: HOSPADM

## 2019-01-10 RX ADMIN — LEVOTHYROXINE SODIUM 125 MCG: 125 TABLET ORAL at 05:01

## 2019-01-10 RX ADMIN — AMITRIPTYLINE HYDROCHLORIDE 20 MG: 10 TABLET, FILM COATED ORAL at 20:37

## 2019-01-10 RX ADMIN — METFORMIN HYDROCHLORIDE 500 MG: 500 TABLET, FILM COATED ORAL at 17:12

## 2019-01-10 RX ADMIN — METOPROLOL SUCCINATE 200 MG: 100 TABLET, EXTENDED RELEASE ORAL at 07:55

## 2019-01-10 RX ADMIN — SITAGLIPTIN 100 MG: 50 TABLET, FILM COATED ORAL at 07:56

## 2019-01-10 RX ADMIN — CALCIUM CARBONATE (ANTACID) CHEW TAB 500 MG 500 MG: 500 CHEW TAB at 07:55

## 2019-01-10 RX ADMIN — ACETAMINOPHEN 975 MG: 325 TABLET, FILM COATED ORAL at 05:01

## 2019-01-10 RX ADMIN — INSULIN GLARGINE 30 UNITS: 100 INJECTION, SOLUTION SUBCUTANEOUS at 20:45

## 2019-01-10 RX ADMIN — CIPROFLOXACIN HYDROCHLORIDE 500 MG: 500 TABLET, FILM COATED ORAL at 20:37

## 2019-01-10 RX ADMIN — METOPROLOL SUCCINATE 200 MG: 100 TABLET, EXTENDED RELEASE ORAL at 20:37

## 2019-01-10 RX ADMIN — GABAPENTIN 300 MG: 300 CAPSULE ORAL at 07:56

## 2019-01-10 RX ADMIN — GABAPENTIN 300 MG: 300 CAPSULE ORAL at 14:26

## 2019-01-10 RX ADMIN — ENOXAPARIN SODIUM 40 MG: 100 INJECTION SUBCUTANEOUS at 20:38

## 2019-01-10 RX ADMIN — METFORMIN HYDROCHLORIDE 500 MG: 500 TABLET, FILM COATED ORAL at 07:56

## 2019-01-10 RX ADMIN — GABAPENTIN 600 MG: 300 CAPSULE ORAL at 20:37

## 2019-01-10 RX ADMIN — VITAMIN D, TAB 1000IU (100/BT) 4000 UNITS: 25 TAB at 07:55

## 2019-01-10 RX ADMIN — ACETAMINOPHEN 975 MG: 325 TABLET, FILM COATED ORAL at 14:25

## 2019-01-10 RX ADMIN — LEVOTHYROXINE SODIUM 50 MCG: 50 TABLET ORAL at 05:01

## 2019-01-10 RX ADMIN — ATORVASTATIN CALCIUM 20 MG: 10 TABLET, FILM COATED ORAL at 20:37

## 2019-01-10 RX ADMIN — CALCIUM CARBONATE (ANTACID) CHEW TAB 500 MG 500 MG: 500 CHEW TAB at 20:37

## 2019-01-10 RX ADMIN — BUDESONIDE AND FORMOTEROL FUMARATE DIHYDRATE 2 PUFF: 80; 4.5 AEROSOL RESPIRATORY (INHALATION) at 20:43

## 2019-01-10 RX ADMIN — AMLODIPINE BESYLATE 5 MG: 5 TABLET ORAL at 20:38

## 2019-01-10 RX ADMIN — SULFAMETHOXAZOLE AND TRIMETHOPRIM 1 TABLET: 800; 160 TABLET ORAL at 07:55

## 2019-01-10 RX ADMIN — ACETAMINOPHEN 975 MG: 325 TABLET, FILM COATED ORAL at 20:37

## 2019-01-10 RX ADMIN — BUDESONIDE AND FORMOTEROL FUMARATE DIHYDRATE 2 PUFF: 80; 4.5 AEROSOL RESPIRATORY (INHALATION) at 09:02

## 2019-01-10 ASSESSMENT — ENCOUNTER SYMPTOMS
SHORTNESS OF BREATH: 0
COUGH: 0
EYES NEGATIVE: 1
ABDOMINAL PAIN: 0
PALPITATIONS: 0
VOMITING: 0
CHILLS: 0
FEVER: 0
NAUSEA: 0
BACK PAIN: 1

## 2019-01-10 ASSESSMENT — GAIT ASSESSMENTS
GAIT LEVEL OF ASSIST: STAND BY ASSIST
DISTANCE (FEET): 220
ASSISTIVE DEVICE: FRONT WHEEL WALKER
DEVIATION: TRENDELENBERG;DECREASED BASE OF SUPPORT;BRADYKINETIC;DECREASED HEEL STRIKE;DECREASED TOE OFF

## 2019-01-10 ASSESSMENT — PAIN SCALES - GENERAL
PAINLEVEL_OUTOF10: 0
PAINLEVEL_OUTOF10: 0

## 2019-01-10 NOTE — CARE PLAN
Problem: Metabolic:  Goal: Ability to maintain appropriate glucose levels will improve  FSBS done ACHS, no acute signs and symptoms of hyper/hypoglycemia noted to patient.

## 2019-01-10 NOTE — CARE PLAN
Problem: Communication  Goal: The ability to communicate needs accurately and effectively will improve  Outcome: PROGRESSING AS EXPECTED  Pt is AOx4 and is able to communicate all needs effectively with staff members. Call light is within reach at bedside.    Problem: Pain Management  Goal: Pain level will decrease to patient's comfort goal  Outcome: PROGRESSING AS EXPECTED  Pt denied pain when initially assessed. Pt educated to call for changes in pain or other needs.

## 2019-01-10 NOTE — PROGRESS NOTES
"Rehab Progress Note     Encounter date: 1/10/2019  Today I met with the patient face to face in OT    Chief Complaint:  Spinal stenosis of lumbar region with neurogenic claudication , feeling irritable today    Interval Events (subjective)  Ms. Chaparro reports that she is feeling irritable today.  She denies any, headache, dizziness, chest pain, or shortness of breath.  The urinary urgency is improving somewhat.  We discussed her labs from today including the elevation in creatinine.  We discussed the likely relationship between this and the Bactrim.  The patient reports that she is attempting to maintain significant hydration.  She reports that she is already had 2 cups of coffee, water bottle, and a juice today.  We discussed the likelihood of transitioning to an alternative antibiotic as we await culture results.  She is agreeable with this plan.  She is very excited about discharge on Saturday.  She denies any significant lower back pain today.    Objective:  VITAL SIGNS: /69   Pulse 72   Temp 36.3 °C (97.3 °F) (Oral)   Resp 18   Ht 1.676 m (5' 6\")   Wt 115 kg (253 lb 9.6 oz)   SpO2 91%   Breastfeeding? No   BMI 40.93 kg/m²     Recent Results (from the past 72 hour(s))   ACCU-CHEK GLUCOSE    Collection Time: 01/07/19  5:21 PM   Result Value Ref Range    Glucose - Accu-Ck 93 65 - 99 mg/dL   ACCU-CHEK GLUCOSE    Collection Time: 01/07/19  8:17 PM   Result Value Ref Range    Glucose - Accu-Ck 118 (H) 65 - 99 mg/dL   BASIC METABOLIC PANEL    Collection Time: 01/08/19  5:35 AM   Result Value Ref Range    Sodium 142 135 - 145 mmol/L    Potassium 4.0 3.6 - 5.5 mmol/L    Chloride 106 96 - 112 mmol/L    Co2 26 20 - 33 mmol/L    Glucose 134 (H) 65 - 99 mg/dL    Bun 26 (H) 8 - 22 mg/dL    Creatinine 1.21 0.50 - 1.40 mg/dL    Calcium 9.4 8.5 - 10.5 mg/dL    Anion Gap 10.0 0.0 - 11.9   CBC WITH DIFFERENTIAL    Collection Time: 01/08/19  5:35 AM   Result Value Ref Range    WBC 11.1 (H) 4.8 - 10.8 K/uL    RBC " 3.83 (L) 4.20 - 5.40 M/uL    Hemoglobin 11.9 (L) 12.0 - 16.0 g/dL    Hematocrit 36.1 (L) 37.0 - 47.0 %    MCV 94.3 81.4 - 97.8 fL    MCH 31.1 27.0 - 33.0 pg    MCHC 33.0 (L) 33.6 - 35.0 g/dL    RDW 43.9 35.9 - 50.0 fL    Platelet Count 420 164 - 446 K/uL    MPV 10.4 9.0 - 12.9 fL    Neutrophils-Polys 55.60 44.00 - 72.00 %    Lymphocytes 26.00 22.00 - 41.00 %    Monocytes 13.20 0.00 - 13.40 %    Eosinophils 3.30 0.00 - 6.90 %    Basophils 1.10 0.00 - 1.80 %    Immature Granulocytes 0.80 0.00 - 0.90 %    Nucleated RBC 0.00 /100 WBC    Neutrophils (Absolute) 6.15 2.00 - 7.15 K/uL    Lymphs (Absolute) 2.88 1.00 - 4.80 K/uL    Monos (Absolute) 1.46 (H) 0.00 - 0.85 K/uL    Eos (Absolute) 0.37 0.00 - 0.51 K/uL    Baso (Absolute) 0.12 0.00 - 0.12 K/uL    Immature Granulocytes (abs) 0.09 0.00 - 0.11 K/uL    NRBC (Absolute) 0.00 K/uL   ESTIMATED GFR    Collection Time: 01/08/19  5:35 AM   Result Value Ref Range    GFR If  53 (A) >60 mL/min/1.73 m 2    GFR If Non  44 (A) >60 mL/min/1.73 m 2   ACCU-CHEK GLUCOSE    Collection Time: 01/08/19  7:44 AM   Result Value Ref Range    Glucose - Accu-Ck 168 (H) 65 - 99 mg/dL   ACCU-CHEK GLUCOSE    Collection Time: 01/08/19 11:19 AM   Result Value Ref Range    Glucose - Accu-Ck 162 (H) 65 - 99 mg/dL   ACCU-CHEK GLUCOSE    Collection Time: 01/08/19  5:02 PM   Result Value Ref Range    Glucose - Accu-Ck 117 (H) 65 - 99 mg/dL   ACCU-CHEK GLUCOSE    Collection Time: 01/08/19  8:42 PM   Result Value Ref Range    Glucose - Accu-Ck 157 (H) 65 - 99 mg/dL   URINALYSIS    Collection Time: 01/08/19 10:30 PM   Result Value Ref Range    Color Yellow     Character Cloudy (A)     Specific Gravity 1.011 <1.035    Ph 6.0 5.0 - 8.0    Glucose Negative Negative mg/dL    Ketones Negative Negative mg/dL    Protein Negative Negative mg/dL    Bilirubin Negative Negative    Urobilinogen, Urine 0.2 Negative    Nitrite Positive (A) Negative    Leukocyte Esterase Large (A) Negative     Occult Blood Trace (A) Negative    Micro Urine Req Microscopic    URINE MICROSCOPIC (W/UA)    Collection Time: 01/08/19 10:30 PM   Result Value Ref Range    WBC Packed (A) /hpf    RBC 0-2 /hpf    Bacteria Many (A) None /hpf    Epithelial Cells Negative /hpf    Hyaline Cast 0-2 /lpf   ACCU-CHEK GLUCOSE    Collection Time: 01/09/19  7:51 AM   Result Value Ref Range    Glucose - Accu-Ck 155 (H) 65 - 99 mg/dL   ACCU-CHEK GLUCOSE    Collection Time: 01/09/19 11:29 AM   Result Value Ref Range    Glucose - Accu-Ck 182 (H) 65 - 99 mg/dL   ACCU-CHEK GLUCOSE    Collection Time: 01/09/19  5:16 PM   Result Value Ref Range    Glucose - Accu-Ck 105 (H) 65 - 99 mg/dL   ACCU-CHEK GLUCOSE    Collection Time: 01/09/19  8:58 PM   Result Value Ref Range    Glucose - Accu-Ck 176 (H) 65 - 99 mg/dL   CBC WITHOUT DIFFERENTIAL    Collection Time: 01/10/19  5:27 AM   Result Value Ref Range    WBC 11.6 (H) 4.8 - 10.8 K/uL    RBC 3.99 (L) 4.20 - 5.40 M/uL    Hemoglobin 12.2 12.0 - 16.0 g/dL    Hematocrit 38.4 37.0 - 47.0 %    MCV 96.2 81.4 - 97.8 fL    MCH 30.6 27.0 - 33.0 pg    MCHC 31.8 (L) 33.6 - 35.0 g/dL    RDW 46.1 35.9 - 50.0 fL    Platelet Count 457 (H) 164 - 446 K/uL    MPV 10.4 9.0 - 12.9 fL   BASIC METABOLIC PANEL    Collection Time: 01/10/19  5:27 AM   Result Value Ref Range    Sodium 142 135 - 145 mmol/L    Potassium 5.2 3.6 - 5.5 mmol/L    Chloride 106 96 - 112 mmol/L    Co2 28 20 - 33 mmol/L    Glucose 132 (H) 65 - 99 mg/dL    Bun 28 (H) 8 - 22 mg/dL    Creatinine 1.42 (H) 0.50 - 1.40 mg/dL    Calcium 9.5 8.5 - 10.5 mg/dL    Anion Gap 8.0 0.0 - 11.9   ESTIMATED GFR    Collection Time: 01/10/19  5:27 AM   Result Value Ref Range    GFR If  44 (A) >60 mL/min/1.73 m 2    GFR If Non  36 (A) >60 mL/min/1.73 m 2   ACCU-CHEK GLUCOSE    Collection Time: 01/10/19  7:19 AM   Result Value Ref Range    Glucose - Accu-Ck 121 (H) 65 - 99 mg/dL   ACCU-CHEK GLUCOSE    Collection Time: 01/10/19 10:56 AM   Result  Value Ref Range    Glucose - Accu-Ck 176 (H) 65 - 99 mg/dL       Current Facility-Administered Medications   Medication Frequency   • ciprofloxacin (CIPRO) tablet 500 mg Q12HRS   • amLODIPine (NORVASC) tablet 5 mg QHS   • benzonatate (TESSALON) capsule 100 mg TID PRN   • oxyCODONE immediate-release (ROXICODONE) tablet 5 mg Q4HRS PRN   • oxyCODONE immediate-release (ROXICODONE) tablet 2.5 mg Q4HRS PRN   • vitamin D (cholecalciferol) tablet 4,000 Units DAILY   • acetaminophen (TYLENOL) tablet 975 mg Q8HRS   • Respiratory Care per Protocol Continuous RT   • Pharmacy Consult Request ...Pain Management Review 1 Each PRN   • hydrALAZINE (APRESOLINE) tablet 25 mg Q8HRS PRN   • artificial tears 1.4 % ophthalmic solution 1 Drop PRN   • benzocaine-menthol (CEPACOL) lozenge 1 Lozenge Q2HRS PRN   • mag hydrox-al hydrox-simeth (MAALOX PLUS ES or MYLANTA DS) suspension 20 mL Q2HRS PRN   • ondansetron (ZOFRAN ODT) dispertab 4 mg 4X/DAY PRN    Or   • ondansetron (ZOFRAN) syringe/vial injection 4 mg 4X/DAY PRN   • traZODone (DESYREL) tablet 50 mg QHS PRN   • sodium chloride (OCEAN) 0.65 % nasal spray 2 Spray PRN   • enoxaparin (LOVENOX) inj 40 mg QHS   • bisacodyl (DULCOLAX) suppository 10 mg Q24HRS PRN   • calcium carbonate (TUMS) chewable tab 500 mg BID   • docusate sodium (COLACE) capsule 100 mg BID   • magnesium hydroxide (MILK OF MAGNESIA) suspension 30 mL QDAY PRN   • senna-docusate (PERICOLACE or SENOKOT S) 8.6-50 MG per tablet 1 Tab Nightly   • tizanidine (ZANAFLEX) tablet 2 mg TID PRN   • amitriptyline (ELAVIL) tablet 20 mg QHS   • atorvastatin (LIPITOR) tablet 20 mg QHS   • budesonide-formoterol (SYMBICORT) 80-4.5 MCG/ACT inhaler 2 Puff BID   • gabapentin (NEURONTIN) capsule 600 mg QHS   • insulin glargine (LANTUS) injection 30 Units Nightly   • metFORMIN (GLUCOPHAGE) tablet 500 mg BID WITH MEALS   • metoprolol SR (TOPROL XL) tablet 200 mg BID   • SITagliptin (JANUVIA) tablet 100 mg QAM   • levothyroxine (SYNTHROID) tablet  125 mcg AM ES    And   • levothyroxine (SYNTHROID) tablet 50 mcg AM ES   • gabapentin (NEURONTIN) capsule 300 mg BID       Exam Date: 1/10/2019    General:  Awake, alert, oriented, no acute distress  HEENT: Stable mild disconjugate gaze  Cardiac: regular rate and rhythm  Lungs: clear to auscultation bilaterally.   Abdomen: soft; non tender, non distended, bowel sounds present and normoactive  Extremities: No edema in the bilateral lower limbs  Neuro:   Standing and working on fine motor tasks with occupational therapy today.      Orders Placed This Encounter   Procedures   • Diet Order Diabetic     Standing Status:   Standing     Number of Occurrences:   1     Order Specific Question:   Diet:     Answer:   Diabetic [3]     Order Specific Question:   Calorie modifications:     Answer:   2000 kcals [5]       Assessment:  Active Hospital Problems    Diagnosis   • *Spinal stenosis of lumbar region with neurogenic claudication   • Vitamin D deficiency   • Pain   • Mixed hyperlipidemia   • Anemia   • Impaired mobility and ADLs   • Congestive heart failure (HCC)   • Type 2 diabetes mellitus with hyperglycemia (HCC)   • Hypothyroid   • Hypothyroid   • HTN (hypertension)       Medical Decision Making and Plan:  Ms. Chaparro is a 73-year-old female admitted for rehabilitation on January 3 due to lumbar stenosis     Lumbar stenosis status post L3-L5 posterior instrumented fusion by Dr. 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  History of multiple lower back surgeries  Continue comprehensive rehabilitation  TLSO on when out of bed    Staples can be removed postoperative day 14 defer staple removal today to allow for further wound healing      Pain  Scheduled Tylenol until pain is better controlled  Oxycodone 2.5-5 mg every 4 hours as needed--dose decreased January 7 due to improvement and decreased utilization     She has used no oxycodone in the last 24  hours     Elavil 20 mg at bedtime     Gabapentin 300 mg in the morning and midday and 600 mg at bedtime     Zanaflex 2 mg every 8 hours as needed     Type 2 diabetes with hyperglycemia  Lantus 30 units nightly  Januvia 100 mg  Metformin 500 mg twice a day  Appreciate hospitalist assistance    Glucose range of 105-176 in the last 24 hours     Hypertension  Congestive heart failure   Amlodipine 5 mg at bedtime  Toprol- mg twice daily    Blood pressure today is 130/69 mmHg   Appreciate hospitalist assistance    Hyperlipidemia  Lipitor 20 mg at bedtime  Total cholesterol 126, LDL 52, HDL 35 on January 4     Hypothyroidism  Synthroid 175 mcg of daily  TSH was 0.5 on January 4     Mild anemia   hemoglobin has improved to 12.2 on January 10    Leukocytosis   likely due to urinary tract infection  Stable at 11.6 on January 10    Renal insufficiency  Creatinine elevated to 1.42 on January 10  This is in the setting of Bactrim use  Discontinuing Bactrim  Continue to monitor  May need supplemental fluids depending on course  Discussed with Dr. Shanks today     Asthma  Symbicort twice daily     Bowel and bladder  History of urinary incontinence  Urinary tract infection   urinalysis from January 8 is positive for leukocyte esterase, packed white blood cells, many bacteria  Urine culture still pending  Discussed with Dr. Shanks today  Plan to transition to ciprofloxacin 500 mg twice a day pending culture data due to renal insufficiency     Colace 100 mg twice a day  Katarzyna-Colace nightly  Milk of magnesia as needed  Bisacodyl suppository as needed     Vitamin D deficiency  Vitamin D was 6 on admission, so we began supplementation with 4000 units of cholecalciferol daily  Continue calcium supplementation with Tums     DVT prophylaxis  Lovenox 40 mg qhs     Estimated discharge: January 12  She will need outpatient PT    Total time:  27 minutes.  I spent greater than 50% of the time for patient care, counseling, and coordination on  this date, including unit/floor time, and face-to-face time with the patient as per interval events and assessment and plan above. Topics discussed included functional progress, discharge planning, lab values, antibiotics, culture data pending    Jace Bajwa M.D.  1/10/2019

## 2019-01-10 NOTE — PROGRESS NOTES
Received shift report and assumed patient care. Patient is awake, alert and oriented. No complains of pain at this time, patient appears calm and comfortable in bed, reinforced safety precautions, will continue to monitor.

## 2019-01-10 NOTE — PROGRESS NOTES
Hospital Medicine Daily Progress Note      Chief Complaint:  HTN, DM    Interval History:  Pt seen and examined in dining room, denies complaints.  UCx not back yet, K+ and Cr up a bit.    Review of Systems  Review of Systems   Constitutional: Negative for chills and fever.   HENT: Negative.    Eyes: Negative.    Respiratory: Negative for cough and shortness of breath.    Cardiovascular: Negative for chest pain and palpitations.   Gastrointestinal: Negative for abdominal pain, nausea and vomiting.   Musculoskeletal: Positive for back pain.        Wound pain        Physical Exam  Temp:  [36.3 °C (97.3 °F)-36.7 °C (98.1 °F)] 36.3 °C (97.3 °F)  Pulse:  [68-80] 72  Resp:  [17-18] 18  BP: (130-150)/(69-76) 130/69    Physical Exam   Constitutional: She is oriented to person, place, and time. No distress.   HENT:   Head: Normocephalic and atraumatic.   Right Ear: External ear normal.   Left Ear: External ear normal.   Eyes: Conjunctivae and EOM are normal. Right eye exhibits no discharge. Left eye exhibits no discharge.   Neck: Normal range of motion. Neck supple. No tracheal deviation present.   Cardiovascular: Normal rate, regular rhythm, S1 normal and S2 normal.    Pulmonary/Chest: Effort normal and breath sounds normal. No stridor. No respiratory distress. She has no wheezes.   Abdominal: Soft. Bowel sounds are normal. She exhibits no distension. There is no tenderness.   Musculoskeletal: She exhibits no edema or tenderness.   Back brace   Neurological: She is alert and oriented to person, place, and time. No sensory deficit.   Skin: Skin is warm and dry. She is not diaphoretic. No cyanosis.   Psychiatric: She has a normal mood and affect. Her behavior is normal.   Nursing note and vitals reviewed.      Fluids    Intake/Output Summary (Last 24 hours) at 01/10/19 0932  Last data filed at 01/10/19 0854   Gross per 24 hour   Intake             1080 ml   Output                0 ml   Net             1080 ml        Laboratory  Recent Labs      01/08/19   0535  01/10/19   0527   WBC  11.1*  11.6*   RBC  3.83*  3.99*   HEMOGLOBIN  11.9*  12.2   HEMATOCRIT  36.1*  38.4   MCV  94.3  96.2   MCH  31.1  30.6   MCHC  33.0*  31.8*   RDW  43.9  46.1   PLATELETCT  420  457*   MPV  10.4  10.4     Recent Labs      01/08/19 0535  01/10/19   0527   SODIUM  142  142   POTASSIUM  4.0  5.2   CHLORIDE  106  106   CO2  26  28   GLUCOSE  134*  132*   BUN  26*  28*   CREATININE  1.21  1.42*   CALCIUM  9.4  9.5                   Imaging    Assessment/Plan  * Spinal stenosis of lumbar region with neurogenic claudication- (present on admission)   Assessment & Plan    S/P surgery  Lumbar back brace     Bacteriuria with pyuria   Assessment & Plan    UCx still pending  Change Bactrim to Cipro due to renal insufficiency     Dyslipidemia   Assessment & Plan    Lipitor     Renal insufficiency   Assessment & Plan    Will D/C Bactrim  Continue to encourage PO fluids  May need IVF if not improved by tomorrow     Vitamin D deficiency   Assessment & Plan    Vit D level 6  On high dose supplementation     Hypothyroid- (present on admission)   Assessment & Plan    Euthyroid on Synthroid     Type 2 diabetes mellitus with hyperglycemia (HCC)- (present on admission)   Assessment & Plan    HbA1c 6.8  On Lantus, Metformin, and Januvia  Monitor glucose trends     HTN (hypertension)- (present on admission)   Assessment & Plan    Continue to monitor BP trends on Norvasc and Toprol     Full Code    Reviewed w/ Pharmacy and Dr. Bajwa

## 2019-01-11 LAB
ANION GAP SERPL CALC-SCNC: 10 MMOL/L (ref 0–11.9)
BACTERIA UR CULT: ABNORMAL
BACTERIA UR CULT: ABNORMAL
BUN SERPL-MCNC: 26 MG/DL (ref 8–22)
CALCIUM SERPL-MCNC: 9.1 MG/DL (ref 8.5–10.5)
CHLORIDE SERPL-SCNC: 108 MMOL/L (ref 96–112)
CO2 SERPL-SCNC: 23 MMOL/L (ref 20–33)
CREAT SERPL-MCNC: 1.35 MG/DL (ref 0.5–1.4)
GLUCOSE BLD-MCNC: 127 MG/DL (ref 65–99)
GLUCOSE BLD-MCNC: 127 MG/DL (ref 65–99)
GLUCOSE BLD-MCNC: 131 MG/DL (ref 65–99)
GLUCOSE SERPL-MCNC: 132 MG/DL (ref 65–99)
POTASSIUM SERPL-SCNC: 4.4 MMOL/L (ref 3.6–5.5)
SIGNIFICANT IND 70042: ABNORMAL
SITE SITE: ABNORMAL
SODIUM SERPL-SCNC: 141 MMOL/L (ref 135–145)
SOURCE SOURCE: ABNORMAL

## 2019-01-11 PROCEDURE — 97535 SELF CARE MNGMENT TRAINING: CPT

## 2019-01-11 PROCEDURE — 700102 HCHG RX REV CODE 250 W/ 637 OVERRIDE(OP): Performed by: PHYSICAL MEDICINE & REHABILITATION

## 2019-01-11 PROCEDURE — 82962 GLUCOSE BLOOD TEST: CPT

## 2019-01-11 PROCEDURE — 36415 COLL VENOUS BLD VENIPUNCTURE: CPT

## 2019-01-11 PROCEDURE — A9270 NON-COVERED ITEM OR SERVICE: HCPCS | Performed by: PHYSICAL MEDICINE & REHABILITATION

## 2019-01-11 PROCEDURE — 80048 BASIC METABOLIC PNL TOTAL CA: CPT

## 2019-01-11 PROCEDURE — A9270 NON-COVERED ITEM OR SERVICE: HCPCS | Performed by: HOSPITALIST

## 2019-01-11 PROCEDURE — 97110 THERAPEUTIC EXERCISES: CPT

## 2019-01-11 PROCEDURE — 700111 HCHG RX REV CODE 636 W/ 250 OVERRIDE (IP): Performed by: PHYSICAL MEDICINE & REHABILITATION

## 2019-01-11 PROCEDURE — 770010 HCHG ROOM/CARE - REHAB SEMI PRIVAT*

## 2019-01-11 PROCEDURE — 97116 GAIT TRAINING THERAPY: CPT

## 2019-01-11 PROCEDURE — 99232 SBSQ HOSP IP/OBS MODERATE 35: CPT | Performed by: HOSPITALIST

## 2019-01-11 PROCEDURE — 97530 THERAPEUTIC ACTIVITIES: CPT

## 2019-01-11 PROCEDURE — 700102 HCHG RX REV CODE 250 W/ 637 OVERRIDE(OP): Performed by: HOSPITALIST

## 2019-01-11 PROCEDURE — 99232 SBSQ HOSP IP/OBS MODERATE 35: CPT | Performed by: PHYSICAL MEDICINE & REHABILITATION

## 2019-01-11 PROCEDURE — 97150 GROUP THERAPEUTIC PROCEDURES: CPT

## 2019-01-11 RX ORDER — AMITRIPTYLINE HYDROCHLORIDE 10 MG/1
20 TABLET, FILM COATED ORAL
Qty: 60 TAB | Refills: 2 | Status: SHIPPED | OUTPATIENT
Start: 2019-01-11

## 2019-01-11 RX ORDER — DOCUSATE SODIUM 100 MG/1
100 CAPSULE, LIQUID FILLED ORAL 2 TIMES DAILY PRN
Status: DISCONTINUED | OUTPATIENT
Start: 2019-01-11 | End: 2019-01-12 | Stop reason: HOSPADM

## 2019-01-11 RX ORDER — CIPROFLOXACIN 500 MG/1
500 TABLET, FILM COATED ORAL EVERY 12 HOURS
Qty: 8 TAB | Refills: 0 | Status: SHIPPED | OUTPATIENT
Start: 2019-01-12 | End: 2019-01-16

## 2019-01-11 RX ORDER — GABAPENTIN 300 MG/1
300-600 CAPSULE ORAL
Qty: 120 CAP | Refills: 2 | Status: SHIPPED | OUTPATIENT
Start: 2019-01-11

## 2019-01-11 RX ORDER — AMLODIPINE BESYLATE 5 MG/1
5 TABLET ORAL
Qty: 30 TAB | Refills: 2 | Status: SHIPPED | OUTPATIENT
Start: 2019-01-11

## 2019-01-11 RX ORDER — OXYCODONE HYDROCHLORIDE 5 MG/1
2.5-5 TABLET ORAL EVERY 4 HOURS PRN
Qty: 28 TAB | Refills: 0 | Status: SHIPPED | OUTPATIENT
Start: 2019-01-11 | End: 2019-01-25

## 2019-01-11 RX ORDER — AMOXICILLIN 250 MG
1 CAPSULE ORAL
Status: DISCONTINUED | OUTPATIENT
Start: 2019-01-11 | End: 2019-01-12 | Stop reason: HOSPADM

## 2019-01-11 RX ORDER — ACETAMINOPHEN 325 MG/1
975 TABLET ORAL EVERY 8 HOURS
Qty: 30 TAB | Refills: 0 | COMMUNITY
Start: 2019-01-11

## 2019-01-11 RX ADMIN — ATORVASTATIN CALCIUM 20 MG: 10 TABLET, FILM COATED ORAL at 21:02

## 2019-01-11 RX ADMIN — METOPROLOL SUCCINATE 200 MG: 100 TABLET, EXTENDED RELEASE ORAL at 21:03

## 2019-01-11 RX ADMIN — METFORMIN HYDROCHLORIDE 500 MG: 500 TABLET, FILM COATED ORAL at 08:16

## 2019-01-11 RX ADMIN — SITAGLIPTIN 100 MG: 50 TABLET, FILM COATED ORAL at 08:16

## 2019-01-11 RX ADMIN — ENOXAPARIN SODIUM 40 MG: 100 INJECTION SUBCUTANEOUS at 21:03

## 2019-01-11 RX ADMIN — BUDESONIDE AND FORMOTEROL FUMARATE DIHYDRATE 2 PUFF: 80; 4.5 AEROSOL RESPIRATORY (INHALATION) at 21:03

## 2019-01-11 RX ADMIN — GABAPENTIN 300 MG: 300 CAPSULE ORAL at 15:05

## 2019-01-11 RX ADMIN — METOPROLOL SUCCINATE 200 MG: 100 TABLET, EXTENDED RELEASE ORAL at 08:17

## 2019-01-11 RX ADMIN — CIPROFLOXACIN HYDROCHLORIDE 500 MG: 500 TABLET, FILM COATED ORAL at 08:17

## 2019-01-11 RX ADMIN — CALCIUM CARBONATE (ANTACID) CHEW TAB 500 MG 500 MG: 500 CHEW TAB at 08:16

## 2019-01-11 RX ADMIN — VITAMIN D, TAB 1000IU (100/BT) 4000 UNITS: 25 TAB at 08:17

## 2019-01-11 RX ADMIN — ACETAMINOPHEN 975 MG: 325 TABLET, FILM COATED ORAL at 15:04

## 2019-01-11 RX ADMIN — LEVOTHYROXINE SODIUM 50 MCG: 50 TABLET ORAL at 05:04

## 2019-01-11 RX ADMIN — GABAPENTIN 600 MG: 300 CAPSULE ORAL at 21:02

## 2019-01-11 RX ADMIN — BUDESONIDE AND FORMOTEROL FUMARATE DIHYDRATE 2 PUFF: 80; 4.5 AEROSOL RESPIRATORY (INHALATION) at 08:17

## 2019-01-11 RX ADMIN — ACETAMINOPHEN 975 MG: 325 TABLET, FILM COATED ORAL at 05:04

## 2019-01-11 RX ADMIN — CIPROFLOXACIN HYDROCHLORIDE 500 MG: 500 TABLET, FILM COATED ORAL at 21:02

## 2019-01-11 RX ADMIN — LEVOTHYROXINE SODIUM 125 MCG: 125 TABLET ORAL at 05:04

## 2019-01-11 RX ADMIN — OXYCODONE HYDROCHLORIDE 5 MG: 5 TABLET ORAL at 15:04

## 2019-01-11 RX ADMIN — CALCIUM CARBONATE (ANTACID) CHEW TAB 500 MG 500 MG: 500 CHEW TAB at 21:03

## 2019-01-11 RX ADMIN — METFORMIN HYDROCHLORIDE 500 MG: 500 TABLET, FILM COATED ORAL at 17:34

## 2019-01-11 RX ADMIN — GABAPENTIN 300 MG: 300 CAPSULE ORAL at 08:16

## 2019-01-11 RX ADMIN — AMITRIPTYLINE HYDROCHLORIDE 20 MG: 10 TABLET, FILM COATED ORAL at 21:03

## 2019-01-11 RX ADMIN — AMLODIPINE BESYLATE 5 MG: 5 TABLET ORAL at 21:03

## 2019-01-11 RX ADMIN — ACETAMINOPHEN 975 MG: 325 TABLET, FILM COATED ORAL at 21:02

## 2019-01-11 RX ADMIN — INSULIN GLARGINE 30 UNITS: 100 INJECTION, SOLUTION SUBCUTANEOUS at 21:04

## 2019-01-11 ASSESSMENT — ENCOUNTER SYMPTOMS
PALPITATIONS: 0
EYES NEGATIVE: 1
VOMITING: 0
ABDOMINAL PAIN: 0
NAUSEA: 0
SHORTNESS OF BREATH: 0
BACK PAIN: 1
FEVER: 0
CHILLS: 0
COUGH: 0

## 2019-01-11 ASSESSMENT — GAIT ASSESSMENTS
GAIT LEVEL OF ASSIST: SUPERVISED
ASSISTIVE DEVICE: 4 WHEEL WALKER
DISTANCE (FEET): 250

## 2019-01-11 ASSESSMENT — PAIN SCALES - GENERAL: PAINLEVEL_OUTOF10: 0

## 2019-01-11 ASSESSMENT — ACTIVITIES OF DAILY LIVING (ADL)
TOILETING_LEVEL_OF_ASSIST: REQUIRES PHYSICAL ASSIST WITH TOILETING
SHOWER_TRANSFER_LEVEL_OF_ASSIST: ABLE TO COMPLETE SHOWER TRANSFER WITHOUT ASSIST
TOILET_TRANSFER_LEVEL_OF_ASSIST: ABLE TO COMPLETE TOILET TRANSFER WITHOUT ASSIST

## 2019-01-11 NOTE — CARE PLAN
Problem: Bowel/Gastric:  Goal: Normal bowel function is maintained or improved  Outcome: PROGRESSING AS EXPECTED  Pt refused evening bowel medication. Last BM was 1/10/19. Pt would prefer for bowel medication to be changed to PRN.    Problem: Pain Management  Goal: Pain level will decrease to patient's comfort goal  Outcome: PROGRESSING AS EXPECTED  Pt denied pain when assessed. Pt educated to call for changes in pain or other needs and verbalized understanding.

## 2019-01-11 NOTE — DISCHARGE PLANNING
01/11/19  1205   Discharge Instructions - Completed by Case Mgmt   Discharge Location   Home with Outpatient Services     Agency Name / Address / Phone   [facility] at [address] [place] [address]. [phone], (they will call you to schedule appointments)     Outpatient Services   Physical Therapy     Medical equipment Provider / Phone   No additional equipment recommended.              Follow-up With  Details  Why  Contact Info   Avel Sheldon M.D. (Neurosurgery)  On 1/14/2019  Neurosurgeon. Monday @ 11:00am.   [address] [facility] [address]  [phone]       J Timothy Lombard, M.D. (Primary Care)  On 1/18/2019  Primary Care. Friday @ 2:45pm.  [address]  [facility] [address]  [phone]

## 2019-01-11 NOTE — PROGRESS NOTES
DATE OF SERVICE:  01/09/2019    Patient seems to have settled into her rehab at followup.  She said she is   meeting all of her goals and participating actively.  Patient talked about   things she would like accomplished over the next several days.  Patient's mood   appeared frustrated, but not overly dysphoric or anxious.  The patient's   assessment of her limitations is realistic.       ____________________________________     CHINA VALENCIA, PHD    ANGELIC / JENNY    DD:  01/11/2019 09:27:06  DT:  01/11/2019 10:06:17    D#:  [id number]  Job#:  [id number]

## 2019-01-11 NOTE — CARE PLAN
Problem: Safety  Goal: Will remain free from injury  Pt uses call light consistently and appropriately. Waits for assistance does not attempt self transfer this shift. Able to verbalize needs.    Problem: Infection  Goal: Will remain free from infection  Pt continent of bl[place] this shift, on cipro for UTI.

## 2019-01-11 NOTE — CARE PLAN
Problem: Bathing  Goal: STG-Within one week, patient will bathe  1) Individualized Goal:  Body with setup and supervision using AE/AD/techniques as needed.  2) Interventions:  OT Group Therapy, OT Self Care/ADL, OT Community Reintegration, OT Manual Ther Technique, OT Neuro Re-Ed/Balance, OT Sensory Int Techniques, OT Therapeutic Activity, OT Evaluation and OT Therapeutic Exercise     Outcome: MET Date Met: 01/11/19      Problem: OT Long Term Goals  Goal: LTG-By discharge, patient will complete basic self care tasks  1) Individualized Goal:  With mod I using AE/AD/techniques as needed.  2) Interventions:  OT Group Therapy, OT Self Care/ADL, OT Community Reintegration, OT Manual Ther Technique, OT Neuro Re-Ed/Balance, OT Sensory Int Techniques, OT Therapeutic Activity, OT Evaluation and OT Therapeutic Exercise   Outcome: MET Date Met: 01/11/19    Goal: LTG-By discharge, patient will perform bathroom transfers  1) Individualized Goal:  With mod I using AE/AD/techniques as needed.  2) Interventions:  OT Group Therapy, OT Self Care/ADL, OT Community Reintegration, OT Manual Ther Technique, OT Neuro Re-Ed/Balance, OT Sensory Int Techniques, OT Therapeutic Activity, OT Evaluation and OT Therapeutic Exercise   Outcome: MET Date Met: 01/11/19

## 2019-01-11 NOTE — CARE PLAN
Problem: PT-Long Term Goals  Goal: LTG-By discharge, patient will ambulate  150ft x1 , FWW, SPV   Outcome: MET Date Met: 01/11/19    Goal: LTG-By discharge, patient will transfer one surface to another  1) Individualized goal:  FWW, SPV  2) Interventions:  PT Group Therapy, PT Gait Training, PT Therapeutic Exercises, PT Neuro Re-Ed/Balance, PT Therapeutic Activity and PT Evaluation     Outcome: MET Date Met: 01/11/19    Goal: LTG-By discharge, patient will ambulate up/down 4-6 stairs  1) Individualized goal: 4 x 1 stairs, HR as needed, SBA  2) Interventions:  PT Group Therapy, PT Gait Training, PT Therapeutic Exercises, PT Neuro Re-Ed/Balance, PT Therapeutic Activity and PT Evaluation     Outcome: MET Date Met: 01/11/19    Goal: LTG-By discharge, patient will transfer in/out of a car  1) Individualized goal:  Lata, FWW  2) Interventions:  PT Group Therapy, PT Gait Training, PT Therapeutic Exercises, PT Neuro Re-Ed/Balance, PT Therapeutic Activity and PT Evaluation     Outcome: MET Date Met: 01/11/19

## 2019-01-11 NOTE — PROGRESS NOTES
Hospital Medicine Daily Progress Note      Chief Complaint:  HTN, DM    Interval History:  Pt seen and examined in therapy gym, doing well.  UCx >100,000 E Coli.  K+ and Cr both down today.    Review of Systems  Review of Systems   Constitutional: Negative for chills and fever.   HENT: Negative.    Eyes: Negative.    Respiratory: Negative for cough and shortness of breath.    Cardiovascular: Negative for chest pain and palpitations.   Gastrointestinal: Negative for abdominal pain, nausea and vomiting.   Musculoskeletal: Positive for back pain.        Wound pain        Physical Exam  Temp:  [36.4 °C (97.6 °F)-36.7 °C (98 °F)] 36.4 °C (97.6 °F)  Pulse:  [60-72] 60  Resp:  [18-20] 18  BP: (123)/(62-71) 123/62    Physical Exam   Constitutional: She is oriented to person, place, and time. No distress.   HENT:   Head: Normocephalic and atraumatic.   Right Ear: External ear normal.   Left Ear: External ear normal.   Eyes: Conjunctivae and EOM are normal. Right eye exhibits no discharge. Left eye exhibits no discharge.   Neck: Normal range of motion. Neck supple. No tracheal deviation present.   Cardiovascular: Normal rate, regular rhythm, S1 normal and S2 normal.    Pulmonary/Chest: Effort normal and breath sounds normal. No stridor. No respiratory distress. She has no wheezes.   Abdominal: Soft. Bowel sounds are normal. She exhibits no distension. There is no tenderness.   Musculoskeletal: She exhibits no edema or tenderness.   Back brace   Neurological: She is alert and oriented to person, place, and time. No sensory deficit.   Skin: Skin is warm and dry. She is not diaphoretic. No cyanosis.   Psychiatric: She has a normal mood and affect. Her behavior is normal.   Nursing note and vitals reviewed.      Fluids    Intake/Output Summary (Last 24 hours) at 01/11/19 1131  Last data filed at 01/11/19 0835   Gross per 24 hour   Intake             1380 ml   Output                0 ml   Net             1380 ml        Laboratory  Recent Labs      01/10/19   0527   WBC  11.6*   RBC  3.99*   HEMOGLOBIN  12.2   HEMATOCRIT  38.4   MCV  96.2   MCH  30.6   MCHC  31.8*   RDW  46.1   PLATELETCT  457*   MPV  10.4     Recent Labs      01/10/19   0527 01/11/19   0539   SODIUM  142  141   POTASSIUM  5.2  4.4   CHLORIDE  106  108   CO2  28  23   GLUCOSE  132*  132*   BUN  28*  26*   CREATININE  1.42*  1.35   CALCIUM  9.5  9.1                   Imaging    Assessment/Plan  * Spinal stenosis of lumbar region with neurogenic claudication- (present on admission)   Assessment & Plan    S/P surgery  Lumbar back brace     Bacteriuria with pyuria   Assessment & Plan    UCx +E Coli  Continue Cipro     Dyslipidemia   Assessment & Plan    Lipitor     Renal insufficiency   Assessment & Plan    K+ and Cr improving off Bactrim and w/ PO fluids  Continue same and follow renal function     Vitamin D deficiency   Assessment & Plan    Vit D level 6  On high dose supplementation     Hypothyroid- (present on admission)   Assessment & Plan    Euthyroid on Synthroid     Type 2 diabetes mellitus with hyperglycemia (HCC)- (present on admission)   Assessment & Plan    HbA1c 6.8  On Lantus, Metformin, and Januvia  Monitor glucose trends     HTN (hypertension)- (present on admission)   Assessment & Plan    Continue to monitor BP trends on Norvasc and Toprol     Full Code

## 2019-01-11 NOTE — CARE PLAN
Problem: PT-Long Term Goals  Goal: LTG-By discharge, patient will transfer in/out of a car  1) Individualized goal:  FLACO GuidoW  2) Interventions:  PT Group Therapy, PT Gait Training, PT Therapeutic Exercises, PT Neuro Re-Ed/Balance, PT Therapeutic Activity and PT Evaluation     Outcome: DISCHARGED-GOAL NOT MET Date Met: 01/11/19

## 2019-01-11 NOTE — PROGRESS NOTES
"Rehab Progress Note     Encounter date: 1/11/2019  Today I met with the patient face to face in PT and later in her room    Chief Complaint:  Spinal stenosis of lumbar region with neurogenic claudication , excitement about discharge      Interval Events (subjective)  Ms. Chaparro feels very excited about discharge tomorrow.  She denies any fevers, chills, headache, dizziness, chest pain, or shortness of breath.  She denies any further dysuria or urinary incontinence.  She reports that her staples are starting to irritate her back.  We discussed opioid risk, and she indicated agreement with small opioid prescription for discharge.    Objective:  VITAL SIGNS: /72   Pulse 71   Temp 36.6 °C (97.8 °F) (Tympanic)   Resp 20   Ht 1.676 m (5' 6\")   Wt 115 kg (253 lb 9.6 oz)   SpO2 92%   Breastfeeding? No   BMI 40.93 kg/m²     Recent Results (from the past 72 hour(s))   ACCU-CHEK GLUCOSE    Collection Time: 01/08/19  5:02 PM   Result Value Ref Range    Glucose - Accu-Ck 117 (H) 65 - 99 mg/dL   ACCU-CHEK GLUCOSE    Collection Time: 01/08/19  8:42 PM   Result Value Ref Range    Glucose - Accu-Ck 157 (H) 65 - 99 mg/dL   URINALYSIS    Collection Time: 01/08/19 10:30 PM   Result Value Ref Range    Color Yellow     Character Cloudy (A)     Specific Gravity 1.011 <1.035    Ph 6.0 5.0 - 8.0    Glucose Negative Negative mg/dL    Ketones Negative Negative mg/dL    Protein Negative Negative mg/dL    Bilirubin Negative Negative    Urobilinogen, Urine 0.2 Negative    Nitrite Positive (A) Negative    Leukocyte Esterase Large (A) Negative    Occult Blood Trace (A) Negative    Micro Urine Req Microscopic    URINE MICROSCOPIC (W/UA)    Collection Time: 01/08/19 10:30 PM   Result Value Ref Range    WBC Packed (A) /hpf    RBC 0-2 /hpf    Bacteria Many (A) None /hpf    Epithelial Cells Negative /hpf    Hyaline Cast 0-2 /lpf   URINE CULTURE-EXISTING-LESS THAN 48 HOURS    Collection Time: 01/08/19 10:30 PM   Result Value Ref Range    " Significant Indicator POS (POS)     Source UR     Site URINE, CLEAN CATCH     Urine Culture - (A)     Urine Culture Escherichia coli  >100,000 cfu/mL   (A)        Susceptibility    Escherichia coli - SENSITIVITY, NEPTALI     Ceftriaxone <=8 Sensitive mcg/mL     Ceftazidime 4 Sensitive mcg/mL     Cephalothin >16 Resistant mcg/mL     Cefotaxime <=2 Sensitive mcg/mL     Ciprofloxacin <=1 Sensitive mcg/mL     Cefepime <=8 Sensitive mcg/mL     Cefuroxime 8 Sensitive mcg/mL     Ampicillin >16 Resistant mcg/mL     Cefotetan <=16 Sensitive mcg/mL     Tobramycin <=4 Sensitive mcg/mL     Nitrofurantoin <=32 Sensitive mcg/mL     Gentamicin <=4 Sensitive mcg/mL     Levofloxacin <=2 Sensitive mcg/mL     Pip/Tazobactam <=16 Sensitive mcg/mL     Piperacillin 32 Intermediate mcg/mL     Trimeth/Sulfa <=2/38 Sensitive mcg/mL     Tigecycline <=2 Sensitive mcg/mL   ACCU-CHEK GLUCOSE    Collection Time: 01/09/19  7:51 AM   Result Value Ref Range    Glucose - Accu-Ck 155 (H) 65 - 99 mg/dL   ACCU-CHEK GLUCOSE    Collection Time: 01/09/19 11:29 AM   Result Value Ref Range    Glucose - Accu-Ck 182 (H) 65 - 99 mg/dL   ACCU-CHEK GLUCOSE    Collection Time: 01/09/19  5:16 PM   Result Value Ref Range    Glucose - Accu-Ck 105 (H) 65 - 99 mg/dL   ACCU-CHEK GLUCOSE    Collection Time: 01/09/19  8:58 PM   Result Value Ref Range    Glucose - Accu-Ck 176 (H) 65 - 99 mg/dL   CBC WITHOUT DIFFERENTIAL    Collection Time: 01/10/19  5:27 AM   Result Value Ref Range    WBC 11.6 (H) 4.8 - 10.8 K/uL    RBC 3.99 (L) 4.20 - 5.40 M/uL    Hemoglobin 12.2 12.0 - 16.0 g/dL    Hematocrit 38.4 37.0 - 47.0 %    MCV 96.2 81.4 - 97.8 fL    MCH 30.6 27.0 - 33.0 pg    MCHC 31.8 (L) 33.6 - 35.0 g/dL    RDW 46.1 35.9 - 50.0 fL    Platelet Count 457 (H) 164 - 446 K/uL    MPV 10.4 9.0 - 12.9 fL   BASIC METABOLIC PANEL    Collection Time: 01/10/19  5:27 AM   Result Value Ref Range    Sodium 142 135 - 145 mmol/L    Potassium 5.2 3.6 - 5.5 mmol/L    Chloride 106 96 - 112 mmol/L     Co2 28 20 - 33 mmol/L    Glucose 132 (H) 65 - 99 mg/dL    Bun 28 (H) 8 - 22 mg/dL    Creatinine 1.42 (H) 0.50 - 1.40 mg/dL    Calcium 9.5 8.5 - 10.5 mg/dL    Anion Gap 8.0 0.0 - 11.9   ESTIMATED GFR    Collection Time: 01/10/19  5:27 AM   Result Value Ref Range    GFR If  44 (A) >60 mL/min/1.73 m 2    GFR If Non  36 (A) >60 mL/min/1.73 m 2   ACCU-CHEK GLUCOSE    Collection Time: 01/10/19  7:19 AM   Result Value Ref Range    Glucose - Accu-Ck 121 (H) 65 - 99 mg/dL   ACCU-CHEK GLUCOSE    Collection Time: 01/10/19 10:56 AM   Result Value Ref Range    Glucose - Accu-Ck 176 (H) 65 - 99 mg/dL   ACCU-CHEK GLUCOSE    Collection Time: 01/10/19  4:49 PM   Result Value Ref Range    Glucose - Accu-Ck 103 (H) 65 - 99 mg/dL   ACCU-CHEK GLUCOSE    Collection Time: 01/10/19  8:45 PM   Result Value Ref Range    Glucose - Accu-Ck 166 (H) 65 - 99 mg/dL   BASIC METABOLIC PANEL    Collection Time: 01/11/19  5:39 AM   Result Value Ref Range    Sodium 141 135 - 145 mmol/L    Potassium 4.4 3.6 - 5.5 mmol/L    Chloride 108 96 - 112 mmol/L    Co2 23 20 - 33 mmol/L    Glucose 132 (H) 65 - 99 mg/dL    Bun 26 (H) 8 - 22 mg/dL    Creatinine 1.35 0.50 - 1.40 mg/dL    Calcium 9.1 8.5 - 10.5 mg/dL    Anion Gap 10.0 0.0 - 11.9   ESTIMATED GFR    Collection Time: 01/11/19  5:39 AM   Result Value Ref Range    GFR If  46 (A) >60 mL/min/1.73 m 2    GFR If Non  38 (A) >60 mL/min/1.73 m 2   ACCU-CHEK GLUCOSE    Collection Time: 01/11/19  7:35 AM   Result Value Ref Range    Glucose - Accu-Ck 127 (H) 65 - 99 mg/dL   ACCU-CHEK GLUCOSE    Collection Time: 01/11/19 11:27 AM   Result Value Ref Range    Glucose - Accu-Ck 131 (H) 65 - 99 mg/dL       Current Facility-Administered Medications   Medication Frequency   • senna-docusate (PERICOLACE or SENOKOT S) 8.6-50 MG per tablet 1 Tab QDAY PRN   • docusate sodium (COLACE) capsule 100 mg BID PRN   • ciprofloxacin (CIPRO) tablet 500 mg Q12HRS   •  amLODIPine (NORVASC) tablet 5 mg QHS   • benzonatate (TESSALON) capsule 100 mg TID PRN   • oxyCODONE immediate-release (ROXICODONE) tablet 5 mg Q4HRS PRN   • oxyCODONE immediate-release (ROXICODONE) tablet 2.5 mg Q4HRS PRN   • vitamin D (cholecalciferol) tablet 4,000 Units DAILY   • acetaminophen (TYLENOL) tablet 975 mg Q8HRS   • Respiratory Care per Protocol Continuous RT   • Pharmacy Consult Request ...Pain Management Review 1 Each PRN   • hydrALAZINE (APRESOLINE) tablet 25 mg Q8HRS PRN   • artificial tears 1.4 % ophthalmic solution 1 Drop PRN   • benzocaine-menthol (CEPACOL) lozenge 1 Lozenge Q2HRS PRN   • mag hydrox-al hydrox-simeth (MAALOX PLUS ES or MYLANTA DS) suspension 20 mL Q2HRS PRN   • ondansetron (ZOFRAN ODT) dispertab 4 mg 4X/DAY PRN    Or   • ondansetron (ZOFRAN) syringe/vial injection 4 mg 4X/DAY PRN   • traZODone (DESYREL) tablet 50 mg QHS PRN   • sodium chloride (OCEAN) 0.65 % nasal spray 2 Spray PRN   • enoxaparin (LOVENOX) inj 40 mg QHS   • bisacodyl (DULCOLAX) suppository 10 mg Q24HRS PRN   • calcium carbonate (TUMS) chewable tab 500 mg BID   • magnesium hydroxide (MILK OF MAGNESIA) suspension 30 mL QDAY PRN   • amitriptyline (ELAVIL) tablet 20 mg QHS   • atorvastatin (LIPITOR) tablet 20 mg QHS   • budesonide-formoterol (SYMBICORT) 80-4.5 MCG/ACT inhaler 2 Puff BID   • gabapentin (NEURONTIN) capsule 600 mg QHS   • insulin glargine (LANTUS) injection 30 Units Nightly   • metFORMIN (GLUCOPHAGE) tablet 500 mg BID WITH MEALS   • metoprolol SR (TOPROL XL) tablet 200 mg BID   • SITagliptin (JANUVIA) tablet 100 mg QAM   • levothyroxine (SYNTHROID) tablet 125 mcg AM ES    And   • levothyroxine (SYNTHROID) tablet 50 mcg AM ES   • gabapentin (NEURONTIN) capsule 300 mg BID       Exam Date: 1/11/2019    General:  Awake, alert, oriented, no acute distress  HEENT: Stable mild disconjugate gaze  Cardiac: regular rate and rhythm  Lungs: clear to auscultation bilaterally.   Abdomen: soft; non tender, non  distended, bowel sounds present and normoactive  Extremities: No edema in the bilateral lower limbs  Skin: Incision continues to heal well.  Staples are in place.  No sign of dehiscence or fluid collection.  Neuro:   Ongoing improvement with fine motor tasks and standing balance.  Gait improving.      Orders Placed This Encounter   Procedures   • Diet Order Diabetic     Standing Status:   Standing     Number of Occurrences:   1     Order Specific Question:   Diet:     Answer:   Diabetic [3]     Order Specific Question:   Calorie modifications:     Answer:   2000 kcals [5]       Assessment:  Active Hospital Problems    Diagnosis   • *Spinal stenosis of lumbar region with neurogenic claudication   • Vitamin D deficiency   • Pain   • Mixed hyperlipidemia   • Anemia   • Impaired mobility and ADLs   • Congestive heart failure (HCC)   • Type 2 diabetes mellitus with hyperglycemia (HCC)   • Hypothyroid   • Hypothyroid   • HTN (hypertension)       Medical Decision Making and Plan:  Ms. Chaparro is a 73-year-old female admitted for rehabilitation on January 3 due to lumbar stenosis     Lumbar stenosis status post L3-L5 posterior instrumented fusion by Dr. 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  I discussed the risk of addiction, potential for overdose leading respiratory depression, which could be fatal.  We discussed  the potential need for opiate antagonist therapy if overdose occurs.      I encouraged the patient to take this medication sparingly with the expressed goal of weaning off the medication as soon as is clinically appropriate.      I informed the patient that we are only able to provide a 14 day supply of these medications at discharge and no refills will be provided by the [facility] medical team.  No replacement prescriptions will be provided for lost prescriptions.  Any medication refill would need to be provided by the patient's primary pain management provider at that provider's discretion.  The patient's primary provider may decide that ongoing opioid medications are no longer necessary.      We discussed the need to safely secure these medications to prevent theft, inadvertent ingestion, or misuse.  Any unused medication should be immediately disposed of through a sanctioned medication disposal program.  We discussed adjunctive pain medications and conservative therapies at length.      NARxCHECK score was: No report found  Opioid Risk tool score was 0    I answered the patient's questions regarding this treatment, and the patient indicated understanding and willingness to proceed.     Type 2 diabetes with hyperglycemia  Lantus 30 units nightly  Januvia 100 mg  Metformin 500 mg twice a day  Appreciate hospitalist assistance    Glucose range of 127-166 in the last 24 hours     Hypertension  Congestive heart failure   Amlodipine 5 mg at bedtime  Toprol- mg twice daily    Blood pressure today is 126/72 mmHg   Appreciate hospitalist assistance    Hyperlipidemia  Lipitor 20 mg at bedtime  Total cholesterol 126, LDL 52, HDL 35 on January 4     Hypothyroidism  Synthroid 175 mcg of daily  TSH was 0.5 on January 4     Mild anemia   hemoglobin has improved to 12.2 on January 10    Leukocytosis   likely due to urinary tract infection  Stable at 11.6 on January 10    Renal insufficiency  Creatinine  elevated to 1.35 on January 11  Improved with discontinuation of bactrim     Asthma  Symbicort twice daily     Bowel and bladder  History of urinary incontinence  Urinary tract infection   urine culture from January 8 is growing E. coli greater than 100,000 colony-forming units.  E. coli is susceptible to ciprofloxacin.  Continue ciprofloxacin 500 mg twice a day     Colace change to as needed  Milk of magnesia as needed  Bisacodyl suppository as needed     Vitamin D deficiency  Vitamin D was 6 on admission, so we began supplementation with 4000 units of cholecalciferol daily  Continue calcium supplementation with Tums     DVT prophylaxis  Lovenox 40 mg qhs     Estimated discharge: January 12  She will need outpatient PT    Total time:  30 minutes.  I spent greater than 50% of the time for patient care, counseling, and coordination on this date, including unit/floor time, and face-to-face time with the patient as per interval events and assessment and plan above. Topics discussed included functional progress, discharge planning, urine culture results, antibiotic therapy.  We also did the opioid risk screening tool and discussed discharge opioid prescription      Jace Bajwa M.D.  1/11/2019

## 2019-01-12 VITALS
SYSTOLIC BLOOD PRESSURE: 146 MMHG | TEMPERATURE: 98.4 F | OXYGEN SATURATION: 90 % | HEART RATE: 58 BPM | HEIGHT: 66 IN | RESPIRATION RATE: 18 BRPM | BODY MASS INDEX: 40.76 KG/M2 | DIASTOLIC BLOOD PRESSURE: 58 MMHG | WEIGHT: 253.6 LBS

## 2019-01-12 LAB
ANION GAP SERPL CALC-SCNC: 8 MMOL/L (ref 0–11.9)
BUN SERPL-MCNC: 24 MG/DL (ref 8–22)
CALCIUM SERPL-MCNC: 9.2 MG/DL (ref 8.5–10.5)
CHLORIDE SERPL-SCNC: 108 MMOL/L (ref 96–112)
CO2 SERPL-SCNC: 25 MMOL/L (ref 20–33)
CREAT SERPL-MCNC: 1.28 MG/DL (ref 0.5–1.4)
ERYTHROCYTE [DISTWIDTH] IN BLOOD BY AUTOMATED COUNT: 45.3 FL (ref 35.9–50)
GLUCOSE BLD-MCNC: 144 MG/DL (ref 65–99)
GLUCOSE BLD-MCNC: 147 MG/DL (ref 65–99)
GLUCOSE SERPL-MCNC: 139 MG/DL (ref 65–99)
HCT VFR BLD AUTO: 35.2 % (ref 37–47)
HGB BLD-MCNC: 11.5 G/DL (ref 12–16)
MCH RBC QN AUTO: 31 PG (ref 27–33)
MCHC RBC AUTO-ENTMCNC: 32.7 G/DL (ref 33.6–35)
MCV RBC AUTO: 94.9 FL (ref 81.4–97.8)
PLATELET # BLD AUTO: 429 K/UL (ref 164–446)
PMV BLD AUTO: 10.4 FL (ref 9–12.9)
POTASSIUM SERPL-SCNC: 4.4 MMOL/L (ref 3.6–5.5)
RBC # BLD AUTO: 3.71 M/UL (ref 4.2–5.4)
SODIUM SERPL-SCNC: 141 MMOL/L (ref 135–145)
WBC # BLD AUTO: 10.1 K/UL (ref 4.8–10.8)

## 2019-01-12 PROCEDURE — 36415 COLL VENOUS BLD VENIPUNCTURE: CPT

## 2019-01-12 PROCEDURE — 85027 COMPLETE CBC AUTOMATED: CPT

## 2019-01-12 PROCEDURE — 99232 SBSQ HOSP IP/OBS MODERATE 35: CPT | Performed by: HOSPITALIST

## 2019-01-12 PROCEDURE — A9270 NON-COVERED ITEM OR SERVICE: HCPCS | Performed by: PHYSICAL MEDICINE & REHABILITATION

## 2019-01-12 PROCEDURE — A9270 NON-COVERED ITEM OR SERVICE: HCPCS | Performed by: HOSPITALIST

## 2019-01-12 PROCEDURE — 80048 BASIC METABOLIC PNL TOTAL CA: CPT

## 2019-01-12 PROCEDURE — 700102 HCHG RX REV CODE 250 W/ 637 OVERRIDE(OP): Performed by: HOSPITALIST

## 2019-01-12 PROCEDURE — 82962 GLUCOSE BLOOD TEST: CPT

## 2019-01-12 PROCEDURE — 700102 HCHG RX REV CODE 250 W/ 637 OVERRIDE(OP): Performed by: PHYSICAL MEDICINE & REHABILITATION

## 2019-01-12 RX ADMIN — SITAGLIPTIN 100 MG: 50 TABLET, FILM COATED ORAL at 08:22

## 2019-01-12 RX ADMIN — METOPROLOL SUCCINATE 200 MG: 100 TABLET, EXTENDED RELEASE ORAL at 08:22

## 2019-01-12 RX ADMIN — ACETAMINOPHEN 975 MG: 325 TABLET, FILM COATED ORAL at 05:40

## 2019-01-12 RX ADMIN — GABAPENTIN 300 MG: 300 CAPSULE ORAL at 08:22

## 2019-01-12 RX ADMIN — VITAMIN D, TAB 1000IU (100/BT) 4000 UNITS: 25 TAB at 08:22

## 2019-01-12 RX ADMIN — LEVOTHYROXINE SODIUM 125 MCG: 125 TABLET ORAL at 05:40

## 2019-01-12 RX ADMIN — CALCIUM CARBONATE (ANTACID) CHEW TAB 500 MG 500 MG: 500 CHEW TAB at 08:25

## 2019-01-12 RX ADMIN — METFORMIN HYDROCHLORIDE 500 MG: 500 TABLET, FILM COATED ORAL at 08:22

## 2019-01-12 RX ADMIN — CIPROFLOXACIN HYDROCHLORIDE 500 MG: 500 TABLET, FILM COATED ORAL at 08:22

## 2019-01-12 RX ADMIN — BUDESONIDE AND FORMOTEROL FUMARATE DIHYDRATE 2 PUFF: 80; 4.5 AEROSOL RESPIRATORY (INHALATION) at 08:25

## 2019-01-12 RX ADMIN — LEVOTHYROXINE SODIUM 50 MCG: 50 TABLET ORAL at 05:40

## 2019-01-12 ASSESSMENT — ENCOUNTER SYMPTOMS
BACK PAIN: 1
VOMITING: 0
COUGH: 0
NAUSEA: 0
EYES NEGATIVE: 1
CHILLS: 0
SHORTNESS OF BREATH: 0
ABDOMINAL PAIN: 0
FEVER: 0
PALPITATIONS: 0

## 2019-01-12 ASSESSMENT — PAIN SCALES - GENERAL: PAINLEVEL_OUTOF10: 0

## 2019-01-12 NOTE — DISCHARGE SUMMARY
Rehabitation Discharge Summary    Admission Date: 1/3/2019    Discharge Date: 1/12/2019    Attending Provider:  Jace Bajwa MD    Admission Diagnosis:   Active Hospital Problems    Diagnosis   • *Spinal stenosis of lumbar region with neurogenic claudication   • Renal insufficiency   • Dyslipidemia   • Bacteriuria with pyuria   • Vitamin D deficiency   • Pedal edema   • Pain   • Mixed hyperlipidemia   • Anemia   • Impaired mobility and ADLs   • Congestive heart failure (HCC)   • Type 2 diabetes mellitus with hyperglycemia (HCC)   • Hypothyroid   • HTN (hypertension)       Discharge Diagnosis:  Active Hospital Problems    Diagnosis   • *Spinal stenosis of lumbar region with neurogenic claudication   • Renal insufficiency   • Dyslipidemia   • Bacteriuria with pyuria   • Vitamin D deficiency   • Pedal edema   • Pain   • Mixed hyperlipidemia   • Anemia   • Impaired mobility and ADLs   • Congestive heart failure (HCC)   • Type 2 diabetes mellitus with hyperglycemia (HCC)   • Hypothyroid   • HTN (hypertension)       HPI per H&P by Dr. Bajwa:  Germaine Chaparro is a 73-year-old right hand dominant female with a history of diabetes, obesity with BMI of 42.9, hypertension, low back pain and multiple lower back surgeries who was admitted on December 28, 2018 with neurogenic claudication with recurrent lumbar stenosis at L3-L5.     She has a history of cervical decompression and fusion in 2015, lumbar discectomy in 2016, and lumbar laminectomy in 2016.      She is now status post L3 nerve root decompression, L4 redo laminectomy, L5 laminectomy, arthrodesis from L3-L5 and instrumented fusion from L3-L5 by Dr. Sheldon on December 28, 2018.     She was seen by Dr. Geronimo on January 2, 2019 and reported significant improvement in preoperative pain.  She has previously participated in acute rehabilitation following lumbar surgeries and has participated and done well.  Dr. Geronimo noted significant right lower limb weakness that was  worse proximally.     She last worked with physical therapy on January 2 and was min assist with a front wheeled walker.  She was mod assist for bed mobility.  Last seen by occupational therapy on January 2 and required max assist for lower body dressing.     She was admitted to [facility] on January 3, 2019    Hospital Course by Problem List:  Lumbar stenosis status post L3-L5 posterior instrumented fusion by Dr. 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  She made excellent progress.  Staples were removed on January 11 and Steri-Strips were applied.  She will need to continue her TLSO when out of bed until cleared by neurosurgery.     Pain  She should continue scheduled Tylenol as long as she is on opioids.  She was discharged with a small prescription for 28 tablets of hydrocodone 5 mg.  We discussed the risks of opioid abuse, overdose, and potential for morbidity/mortality.  I encouraged her to take these sparingly and continue weaning as quickly as possible.  She can continue her gabapentin 300 mg in the morning, midday, and 600 mg at bedtime.  She can also continue her nocturnal Elavil.  If neuropathic pain improves, she should begin tapering the Elavil.     Type 2 diabetes with hyperglycemia  Her blood glucose remained well controlled during her hospitalization.  I appreciate hospitalist assistance.  She can continue her Lantus, Januvia, and metformin.      Hypertension  Congestive heart failure   She can continue amlodipine at increased dose and Toprol-XL.  Her blood pressure on January 11 was 126/72 mmHg.       Hyperlipidemia  Lipitor 20 mg at bedtime  Total cholesterol 126, LDL 52, HDL 35 on January 4     Hypothyroidism  Synthroid 175 mcg of daily  TSH was 0.5 on January 4     Mild anemia   hemoglobin has improved to  12.2 on January 10     Leukocytosis   this was likely due to her urinary tract infection and should be rechecked by her primary care provider.     Renal insufficiency  Creatinine elevated to 1.35 on   Improved with discontinuation of bactrim  This should be followed up by her primary care provider.     Asthma  Symbicort twice daily     Bowel and bladder  History of urinary incontinence  Urinary tract infection  Urine culture from  is growing E. coli greater than 100,000 colony-forming units.  E. coli is susceptible to ciprofloxacin.  Continue ciprofloxacin 500 mg twice a day until  to complete a 7-day course.     She can continue over-the-counter bowel medications as needed.  Her bowel function improved significantly with decreased oxycodone usage.     Vitamin D deficiency  Vitamin D was 6 on admission, so we began supplementation with 4000 units of cholecalciferol daily    Functional Status at Discharge  Eatin - Independent  Eating Description:     Groomin - Independent  Grooming Description:   (independent seated)  Bathin - Modified Independent  Bathing Description:  Grab bar, Long handled bath tool, Hand held shower, Increased time  Upper Body Dressin - Modified Independent  Upper Body Dressing Description:   (additional time, able to retreive clothing using FWW)  Lower Body Dressin - Modified Independent  Lower Body Dressing Description:  6 - Modified Independent  Discharge Location : Home  Patient Discharging with Assist of: Friend  Level of Supervision Required: Intermittent Supervision  Recommended Equipment for Discharge: Front-Wheeled Walker;Sock Aid  Long Term Goals Met: 2  Long Term Goals Not Met: 0  Criteria for Termination of Services: Maximum Function Achieved for Inpatient Rehabilitation  Walk:  5 - Standby Prompting/Supervision or Set-up  Distance Walked:  Walks a minimum of 150 feet  Walk Description:  Extra time, Verbal cueing, Supervision for  "safety, Walker (AMB x 250 feet with 4WW and SPV)  Wheelchair:  5E - Household Exception  Distance Propelled:  Propels  feet   Wheelchair Description:  Adaptive equipment, Extra time (x75 feet, mod I with BUE/BLE)  Stairs 5 - Standby Prompting/Supervision or Set-up  Stairs Description (12 x 1 6\" steps, B HR, step to, SPV)  Discharge Location: Home  Patient Discharging with Assist of: Friend  Level of Supervision Required Upon Discharge: Intermittent Supervision  Recommended Equipment for Discharge: None (pt owns FWW)  Recommeded Services Upon Discharge: Outpatient Physical Therapy  Long Term Goals Met: 3  Long Term Goals Not Met: 0  Criteria for Termination of Services: Maximum Function Achieved for Inpatient Rehabilitation  Comprehension Mode:  Auditory  Comprehension:  7 - Independent  Comprehension Description:  Glasses  Expression Mode:  Both  Expression:  7 - Independent  Expression Description:     Social Interaction:  7 - Independent  Social Interaction Description:     Problem Solvin - Independent  Problem Solving Description:     Memory:  7 - Independent  Memory Description:            Discharge Medication:    I performed a medication reconciliation at discharge and no potential clinically significant medication issues were identified       Medication List      START taking these medications      Instructions   acetaminophen 325 MG Tabs  Commonly known as:  TYLENOL   Take 3 Tabs by mouth every 8 hours. Transition to as needed when off of oxycodone  Dose:  975 mg     Cholecalciferol 4000 units Tabs  Start taking on:  2019   Take 4,000 Units by mouth every day.  Dose:  4000 Units     ciprofloxacin 500 MG Tabs  Start taking on:  2019  Commonly known as:  CIPRO   Take 1 Tab by mouth every 12 hours for 4 days.  Dose:  500 mg     oxyCODONE immediate-release 5 MG Tabs  Commonly known as:  ROXICODONE   Take 0.5-1 Tabs by mouth every four hours as needed (2.5 mg moderate, 5 mg severe pain) for up " to 14 days.  Dose:  2.5-5 mg        CHANGE how you take these medications      Instructions   amLODIPine 5 MG Tabs  What changed:  how much to take  Commonly known as:  NORVASC   Take 1 Tab by mouth every bedtime.  Dose:  5 mg     gabapentin 300 MG Caps  What changed:  · how much to take  · additional instructions  · Another medication with the same name was removed. Continue taking this medication, and follow the directions you see here.  Commonly known as:  NEURONTIN   Take 1-2 Caps by mouth every bedtime. 300 mg morning and mid day, 600 mg bedtime  Dose:  300-600 mg        CONTINUE taking these medications      Instructions   amitriptyline 10 MG Tabs  Commonly known as:  ELAVIL   Take 2 Tabs by mouth every bedtime.  Dose:  20 mg     atorvastatin 20 MG Tabs  Commonly known as:  LIPITOR   Take 1 Tab by mouth every bedtime.  Dose:  20 mg     budesonide-formoterol 80-4.5 MCG/ACT Aero  Commonly known as:  SYMBICORT   Inhale 2 Puffs by mouth 2 Times a Day.  Dose:  2 Puff     LANTUS 100 UNIT/ML Soln  Generic drug:  insulin glargine   Inject 30 Units as instructed every evening. 15 units night prior to surgery  Dose:  30 Units     levothyroxine 175 MCG Tabs  Commonly known as:  SYNTHROID   Take 175 mcg by mouth Every morning on an empty stomach.  Dose:  175 mcg     metFORMIN 500 MG Tabs  Commonly known as:  GLUCOPHAGE   Take 500 mg by mouth 2 times a day, with meals.  Dose:  500 mg     SITagliptin 100 MG Tabs  Commonly known as:  JANUVIA   Take 100 mg by mouth every morning.  Dose:  100 mg     TOPROL  MG Tb24  Generic drug:  metoprolol SR   Take 200 mg by mouth 2 Times a Day.  Dose:  200 mg        STOP taking these medications    docusate sodium 100 MG Caps     FARXIGA 5 MG Tabs  Generic drug:  Dapagliflozin Propanediol     oxyCODONE-acetaminophen 5-325 MG Tabs  Commonly known as:  PERCOCET     tizanidine 2 MG tablet  Commonly known as:  ZANAFLEX            Discharge Diet:  Diabetic    Discharge Activity:  As  tolerated    Disposition:  Patient to discharge home        Follow-up:  DISCHARGE INSTRUCTIONS:  Follow up with your primary care provider (PCP) within 7-10 days of discharge to review your medications and take over your care.     If you develop chest pain, fever, chills, change in neurologic function (weakness, sensation changes, vision changes), or other concerning symptoms, seek immediate medical attention or call 911.      Follow up: please see Case Management Discharge Instructions for follow up appointment information, DME information, and other useful discharge planning information.     Do not drive until cleared by your PCP    Condition on Discharge:  Kieran Bajwa M.D.  1/11/2019

## 2019-01-12 NOTE — CARE PLAN
Problem: Safety  Goal: Will remain free from injury  Outcome: PROGRESSING AS EXPECTED  Patient use call light for assistance, remains free from injury.     Problem: Pain Management  Goal: Pain level will decrease to patient's comfort goal  Outcome: PROGRESSING AS EXPECTED  Patient denied any pain or discomfort during shift.

## 2019-01-12 NOTE — PROGRESS NOTES
DATE OF SERVICE:  01/11/2019    Patient reported she is doing very well.  She said she will be discharged   tomorrow.  She said she is completing all of her ADLs safely.  She said she is   very confident returning home.  Various considerations with regard to her   home program were talked about.       ____________________________________     CHINA VALENCIA, PHD    ANGELIC / JENNY    DD:  01/12/2019 11:30:38  DT:  01/12/2019 13:14:46    D#:  [id number]  Job#:  [id number]

## 2019-01-12 NOTE — PROGRESS NOTES
Received shift report and assumed care of patient.  Patient awake, calm and stable, up into wheelchair. call light within reach.  Denies pain or discomfort at this time.  Will continue to monitor.

## 2019-01-12 NOTE — PROGRESS NOTES
Patient discharged to home per order.  Reviewed all discharge instructions, appointments, discharge medications, and wound care instructions with patient and her friend; they verbalize understanding.  Education provided in discharge instructions about high blood pressure, DM, wound care and Home Safety and Fall Prevention. Discharge paperwork completed; signed copies in chart.  Patient has education binder and all belongings; signed copy in chart.  Pt alert, calm, stable; no change in status from morning assessment.  Patient left facility at about 1110am via wheelchair accompanied by staff; escorted to car by staff.  Have enjoyed working with this pleasant patient.

## 2019-01-12 NOTE — CARE PLAN
Problem: Venous Thromboembolism (VTW)/Deep Vein Thrombosis (DVT) Prevention:  Goal: Patient will participate in Venous Thrombosis (VTE)/Deep Vein Thrombosis (DVT)Prevention Measures  Outcome: PROGRESSING AS EXPECTED  Patient is receiving Lovenox for DVT prevention, no s/s of DVT at this time. Will monitor and assess as needed.    Problem: Metabolic:  Goal: Ability to maintain appropriate glucose levels will improve  Outcome: PROGRESSING AS EXPECTED  Patient is receiving 30 units Lantus, her blood sugar reading was 147. Patient refused her HS boost. Will monitor and assess as needed for hypo/hyperglycemia.

## 2019-01-12 NOTE — PROGRESS NOTES
Hospital Medicine Daily Progress Note      Chief Complaint:  HTN, DM    Interval History:  Pt excited to go home.  Kidney function better.    Review of Systems  Review of Systems   Constitutional: Negative for chills and fever.   HENT: Negative.    Eyes: Negative.    Respiratory: Negative for cough and shortness of breath.    Cardiovascular: Negative for chest pain and palpitations.   Gastrointestinal: Negative for abdominal pain, nausea and vomiting.   Musculoskeletal: Positive for back pain.        Wound pain        Physical Exam  Temp:  [36.5 °C (97.7 °F)-36.9 °C (98.4 °F)] 36.9 °C (98.4 °F)  Pulse:  [58-71] 58  Resp:  [18-20] 18  BP: (121-146)/(56-72) 146/58    Physical Exam   Constitutional: She is oriented to person, place, and time. No distress.   HENT:   Head: Normocephalic and atraumatic.   Right Ear: External ear normal.   Left Ear: External ear normal.   Eyes: Conjunctivae and EOM are normal. Right eye exhibits no discharge. Left eye exhibits no discharge.   Neck: Normal range of motion. Neck supple. No tracheal deviation present.   Cardiovascular: Normal rate, regular rhythm, S1 normal and S2 normal.    Pulmonary/Chest: Effort normal and breath sounds normal. No stridor. No respiratory distress. She has no wheezes.   Abdominal: Soft. Bowel sounds are normal. She exhibits no distension. There is no tenderness.   Musculoskeletal: She exhibits no edema or tenderness.   Back brace   Neurological: She is alert and oriented to person, place, and time. No sensory deficit.   Skin: Skin is warm and dry. She is not diaphoretic. No cyanosis.   Psychiatric: She has a normal mood and affect. Her behavior is normal.   Nursing note and vitals reviewed.      Fluids    Intake/Output Summary (Last 24 hours) at 01/12/19 1003  Last data filed at 01/12/19 0854   Gross per 24 hour   Intake             1170 ml   Output                0 ml   Net             1170 ml       Laboratory  Recent Labs      01/10/19   0512  01/12/19    0540   WBC  11.6*  10.1   RBC  3.99*  3.71*   HEMOGLOBIN  12.2  11.5*   HEMATOCRIT  38.4  35.2*   MCV  96.2  94.9   MCH  30.6  31.0   MCHC  31.8*  32.7*   RDW  46.1  45.3   PLATELETCT  457*  429   MPV  10.4  10.4     Recent Labs      01/10/19   0527  01/11/19   0539  01/12/19   0540   SODIUM  142  141  141   POTASSIUM  5.2  4.4  4.4   CHLORIDE  106  108  108   CO2  28  23  25   GLUCOSE  132*  132*  139*   BUN  28*  26*  24*   CREATININE  1.42*  1.35  1.28   CALCIUM  9.5  9.1  9.2                   Imaging    Assessment/Plan  * Spinal stenosis of lumbar region with neurogenic claudication- (present on admission)   Assessment & Plan    S/P surgery  Lumbar back brace     Bacteriuria with pyuria   Assessment & Plan    UCx +E Coli  Continue Abx x 7 days     Dyslipidemia- (present on admission)   Assessment & Plan    Lipitor     Renal insufficiency- (present on admission)   Assessment & Plan    Renal function improving off Bactrim and w/ PO fluids     Vitamin D deficiency- (present on admission)   Assessment & Plan    Vit D level 6  On high dose supplementation     Hypothyroid- (present on admission)   Assessment & Plan    Euthyroid on Synthroid     Type 2 diabetes mellitus with hyperglycemia (HCC)- (present on admission)   Assessment & Plan    HbA1c 6.8  BP controlled on Lantus, Metformin, and Januvia       HTN (hypertension)- (present on admission)   Assessment & Plan    BP controlled on Norvasc and Toprol     Full Code

## 2019-01-12 NOTE — CARE PLAN
Problem: Infection  Goal: Will remain free from infection  Staples on the back incision removed, edges are well approximated,  steri strips applied, no s/s of infection noted.

## 2019-01-12 NOTE — DISCHARGE INSTRUCTIONS
[facility] NURSING DISCHARGE INSTRUCTIONS    Blood Pressure : 121/56  Weight: 115 kg (253 lb 9.6 oz)  Nursing recommendations for Germaine Chaparro at time of discharge are as follows:  Client verbalized understanding of all discharge instructions and prescriptions.     Review all your home medications and newly ordered medications with your doctor and/or pharmacist. Follow medication instructions as directed by your doctor and/or pharmacist.    Pain Management:   Discharge Pain Medication Instructions:  Comfort Goal: Comfort at Rest, Comfort with Movement, Perform Activity  Notify your primary care provider if pain is unrelieved with these measures, if the pain is new, or increased in intensity.    Discharge Skin Characteristics:    Discharge Skin Exam:    Surgical Incision  Incision Neck (Active)       Surgical Incision  Incision N/A Back (Active)       Surgical Incision  Incision Back (Active)       Surgical Incision  Incision Cervical Posterior (Active)       Surgical Incision  Incision Posterior Back (Active)       Surgical Incision  Incision Cervical Posterior (Active)       Wound POA Rash Groin Right (Active)       Wound POA Rash Groin Left (Active)       Wound POA Abrasion Mouth Right Upper (Active)     Skin / Wound Care Instructions: Please contact your primary care physician for any change in skin integrity.     If You Have Surgical Incisions / Wounds:  Monitor surgical site(s) for signs of increased swelling, redness or symptoms of drainage from the site or fever as this could indicate signs and symptoms of infection. If these symptoms are noted, notifiy your primary care provider.      Discharge Safety Instructions: Should Have ADULT SUPERVISION     Discharge Safety Concerns: Weakness  The interdisciplinary team has made recommendation that you should have adult supervision in the house due to weakness  Anti-embolic stockings are not required to increase circulation to the lower  extremities.    Discharge Diet: Diabetic     Discharge Liquids: Thin  Discharge Bowel Function: Continent  Please contact your primary care physician for any changes in bowel habits.  Discharge Bowel Program:    Discharge Bladder Function: Continent  Discharge Urinary Devices:        Nursing Discharge Plan:   Influenza Vaccine Indication: Not indicated: Previously immunized this influenza season and > 8 years of age    Case Management Discharge Instructions:   Discharge Location: Home with Outpatient Services  Agency Name/Address/Phone: [facility] at [address] [place] [address].  [id number], (they will call you to schedule appointments)  Home Health:    Outpatient Services: Physical Therapy  DME Provider/Phone: No additional equipment recommended.  Medical Equipment Ordered:    Prescription Faxed to:        Discharge Medication Instructions:  Below are the medications your physician expects you to take upon discharge:        Hypertension  Hypertension is another name for high blood pressure. High blood pressure forces your heart to work harder to pump blood. A blood pressure reading has two numbers, which includes a higher number over a lower number (example: 110/72).  Follow these instructions at home:  · Have your blood pressure rechecked by your doctor.  · Only take medicine as told by your doctor. Follow the directions carefully. The medicine does not work as well if you skip doses. Skipping doses also puts you at risk for problems.  · Do not smoke.  · Monitor your blood pressure at home as told by your doctor.  Contact a doctor if:  · You think you are having a reaction to the medicine you are taking.  · You have repeat headaches or feel dizzy.  · You have puffiness (swelling) in your ankles.  · You have trouble with your vision.  Get help right away if:  · You get a very bad headache and are confused.  · You feel weak, numb, or faint.  · You get chest or belly (abdominal) pain.  · You throw up  (vomit).  · You cannot breathe very well.  This information is not intended to replace advice given to you by your health care provider. Make sure you discuss any questions you have with your health care provider.  Document Released: 06/05/2009 Document Revised: 05/25/2017 Document Reviewed: 10/10/2014  [employer] Interactive Patient Education © 2017 [employer]          How can pain medicine affect me?  You were prescribed pain medicine. This medicine may:  · Make you tired or sleepy.  · Make you feel dizzy.  · Affect how well you can:  ¨ Drive  ¨ Do certain activities.  Pain medicine may not make all of your pain go away. You should be comfortable enough to:  · Move.  · Breathe.  · Take care of yourself.  How often should I take pain medicine and how much should I take?  · Take pain medicine only as told by your doctor and only as needed for pain.  · You do not need to take pain medicine if you are not having pain, unless your doctor tells you to do that.  · You can take less than the prescribed dose if you find that less medicine helps your pain.  · If you have very bad (severe) pain, call your doctor. Do not take more pills than told by your doctor. Do not take pills more often than told by your doctor.  What should I avoid while I am taking pain medicine?  Follow these instructions after you start taking pain medicine, while you are taking the medicine, and for 8 hours after you stop taking the medicine:  · Do not drive.  · Do not use machinery.  · Do not use power tools.  · Do not sign legal documents.  · Do not drink alcohol.  · Do not take sleeping pills.  · Do not take care of children by yourself.  · Do not do any activities that involve climbing or being in high places.  · Do not go into any body of water unless there is an adult nearby who can watch and help you. This includes:  ¨ Lakes.  ¨ Rivers.  ¨ Oceans.  ¨ Spas.  ¨ Swimming pools.  How can I keep others safe while I am taking pain  medicine?  · Store your pain medicine as told by your doctor. Make sure that you keep it where children and pets cannot reach it.  · Do not share your pain medicine with anyone.  · Do not save any leftover pills. If you have any leftover pain medicine, get rid of it or destroy it as told by your doctor.  What else do I need to know about taking pain medicine?  · Use a poop (stool) softener if you have trouble pooping (constipation) because of your pain medicine. Eating more fruits and vegetables also helps with constipation.  · Write down the times when you take your pain medicine. Look at the times before you take your next dose of medicine.  · Your pain medicine might have acetaminophen in it. Do not take any other acetaminophen while you are taking this medicine. An overdose of acetaminophen can do very bad damage to your liver. If you are taking any medicines in addition to your pain medicine, check the active ingredients on those medicines to see if acetaminophen is listed.  When should I call my doctor?  · Your medicine is not helping the pain.  · You do either of these soon after you take the medicine:  ¨ Throw up (vomit).  ¨ Have watery poop (diarrhea).  · You have new pain in areas that did not hurt before.  · You have an allergic reaction to your medicine. This may include:  ¨ Feeling itchy.  ¨ Swelling.  ¨ Feeling dizzy.  ¨ Getting a new rash.  · You cannot put up with feeling:  ¨ Dizzy.  ¨ Sick to your stomach (nauseous).  When should I call 911 or go to the emergency room?  · You pass out (faint).  · You feel very confused.  · You throw up again and again.  · Your skin or lips turn pale or bluish in color.  · You are:  ¨ Short of breath.  ¨ Breathing much more slowly than usual.  · You have a very bad allergic reaction to your medicine. This includes:  ¨ Developing a swollen tongue.  ¨ Having trouble breathing.  This information is not intended to replace advice given to you by your health care  provider. Make sure you discuss any questions you have with your health care provider.  Document Released: 06/05/2009 Document Revised: 08/24/2017 Document Reviewed: 10/22/2015  © 2017 [employer]    Lumbar Laminectomy, Care After  Refer to this sheet in the next few weeks. These instructions provide you with information on caring for yourself after your procedure. Your health care provider may also give you more specific instructions. Your treatment has been planned according to current medical practices, but problems sometimes occur. Call your health care provider if you have any problems or questions after your procedure.  HOME CARE INSTRUCTIONS   · Check the incision twice a day for signs of infection. Some signs may include a foul-smelling, greenish or yellowish discharge from the wound, increased pain, or increased redness over the incision.  · Change your bandages about 24-36 hours after surgery, or as directed.  · You may shower once the bandage is removed, or as directed. Avoid tub baths, swimming, and hot tubs for 3 weeks or until your incision has healed completely. If you have stitches or staples, they may be removed 2-3 weeks after surgery, or as directed by your doctor.  · Daily exercise is helpful to prevent the return of problems. Walking is permitted. You may use a treadmill without an incline. Cut down on activities and exercise if you have discomfort. You may also go up and down stairs as much as you can tolerate.  · Do not lift anything heavier than 15 lb. Avoid bending or twisting at the waist. Always bend your knees.  · Maintain strength and range of motion as instructed.  · Do not drive for 2-3 weeks, or as directed by your doctor. You may be a passenger for 20-30 minutes at a time. Lying back in the passenger seat may be more comfortable for you.  · Limit your sitting to intervals of 20-30 minutes. You should lie down or walk in between sitting periods. There are no limitations for sitting in a  recliner chair.  · Only take over-the-counter or prescription medicines for pain, discomfort, or fever as directed by your health care provider.  SEEK MEDICAL CARE IF:   · There is increased bleeding (more than a small spot) from the wound.  · You notice redness, swelling, or increasing pain in the wound.  · Pus is coming from the wound.  · You have a fever for more than 2-3 days.  · You notice a foul smell coming from the wound or dressing.  · You have increasing pain in your wound.  SEEK IMMEDIATE MEDICAL CARE IF:   · You develop a rash.  · You have difficulty breathing.  · You have any allergic problems.  · You develop a headache or stiff neck that does not respond to pain relievers.  · You are unable to urinate.  · You develop new onset of pain, numbness, or weakness in the buttocks or lower extremities.  MAKE SURE YOU:   · Understand these instructions.  · Will watch your condition.  · Will get help right away if you are not doing well or get worse.     This information is not intended to replace advice given to you by your health care provider. Make sure you discuss any questions you have with your health care provider.     Document Released: 11/21/2005 Document Revised: 08/20/2014 Document Reviewed: 05/15/2014  [employer] Interactive Patient Education ©2016 [employer]  Diabetes Mellitus and Standards of Medical Care  Managing diabetes (diabetes mellitus) can be complicated. Your diabetes treatment may be managed by a team of health care providers, including:  · A diet and nutrition specialist (registered dietitian).  · A nurse.  · A certified diabetes educator (CDE).  · A diabetes specialist (endocrinologist).  · An eye doctor.  · A primary care provider.  · A dentist.  Your health care providers follow a schedule in order to help you get the best quality of care. The following schedule is a general guideline for your diabetes management plan. Your health care providers may also give you more specific  instructions.  HbA1c (  hemoglobin A1c) test  This test provides information about blood sugar (glucose) control over the previous 2-3 months. It is used to check whether your diabetes management plan needs to be adjusted.  · If you are meeting your treatment goals, this test is done at least 2 times a year.  · If you are not meeting treatment goals or if your treatment goals have changed, this test is done 4 times a year.  Blood pressure test  · This test is done at every routine medical visit. For most people, the goal is less than 140/90. In some cases, your goal blood pressure may be 130/80 or less. Ask your health care provider what your goal blood pressure should be.  Dental and eye exams  · Visit your dentist two times a year.  · If you have type 1 diabetes, get an eye exam 3-5 years after you are diagnosed, and then once a year after your first exam.  ¨ If you were diagnosed with type 1 diabetes as a child, get an eye exam when you are age 10 or older and have had diabetes for 3-5 years. After the first exam, you should get an eye exam once a year.  · If you have type 2 diabetes, have an eye exam as soon as you are diagnosed, and then once a year after your first exam.  Foot care exam  · Visual foot exams are done at every routine medical visit. The exams check for cuts, bruises, redness, blisters, sores, or other problems with the feet.  · A complete foot exam is done by your health care provider once a year. This exam includes an inspection of the structure and skin of your feet, and a check of the pulses and sensation in your feet.  ¨ Type 1 diabetes: Get your first exam 3-5 years after diagnosis.  ¨ Type 2 diabetes: Get your first exam as soon as you are diagnosed.  · Check your feet every day for cuts, bruises, redness, blisters, or sores. If you have any of these or other problems that are not healing, contact your health care provider.  Kidney function test (  urine microalbumin)  · This test is done  once a year.  ¨ Type 1 diabetes: Get your first test 5 years after diagnosis.  ¨ Type 2 diabetes: Get your first test as soon as you are diagnosed.  · If you have chronic kidney disease (CKD), get a serum creatinine and estimated glomerular filtration rate (eGFR) test once a year.  Lipid profile (cholesterol, HDL, LDL, triglycerides)  · This test should be done when you are diagnosed with diabetes, and every 5 years after the first test. If you are on medicines to lower your cholesterol, you may need to get this test done every year.  ¨ The goal for LDL is less than 100 mg/dL (5.5 mmol/L). If you are at high risk, the goal is less than 70 mg/dL (3.9 mmol/L).  ¨ The goal for HDL is 40 mg/dL (2.2 mmol/L) for men and 50 mg/dL(2.8 mmol/L) for women. An HDL cholesterol of 60 mg/dL (3.3 mmol/L) or higher gives some protection against heart disease.  ¨ The goal for triglycerides is less than 150 mg/dL (8.3 mmol/L).  Immunizations  · The yearly flu (influenza) vaccine is recommended for everyone 6 months or older who has diabetes.  · The pneumonia (pneumococcal) vaccine is recommended for everyone 2 years or older who has diabetes. If you are 65 or older, you may get the pneumonia vaccine as a series of two separate shots.  · The hepatitis B vaccine is recommended for adults shortly after they have been diagnosed with diabetes.  · The Tdap (tetanus, diphtheria, and pertussis) vaccine should be given:  ¨ According to normal childhood vaccination schedules, for children.  ¨ Every 10 years, for adults who have diabetes.  · The shingles vaccine is recommended for people who have had chicken pox and are 50 years or older.  Mental and emotional health  · Screening for symptoms of eating disorders, anxiety, and depression is recommended at the time of diagnosis and afterward as needed. If your screening shows that you have symptoms (you have a positive screening result), you may need further evaluation and be referred to a mental  health care provider.  Diabetes self-management education  · Education about how to manage your diabetes is recommended at diagnosis and ongoing as needed.  Treatment plan  · Your treatment plan will be reviewed at every medical visit.  Summary  · Managing diabetes (diabetes mellitus) can be complicated. Your diabetes treatment may be managed by a team of health care providers.  · Your health care providers follow a schedule in order to help you get the best quality of care.  · Standards of care including having regular physical exams, blood tests, blood pressure monitoring, immunizations, screening tests, and education about how to manage your diabetes.  · Your health care providers may also give you more specific instructions based on your individual health.  This information is not intended to replace advice given to you by your health care provider. Make sure you discuss any questions you have with your health care provider.  Document Released: 10/15/2010 Document Revised: 09/15/2017 Document Reviewed: 09/15/2017  [employer] Interactive Patient Education © 2017 [employer]        Suicidal Feelings: How to Help Yourself  Suicide is the taking of one's own life. If you feel as though life is getting too tough to handle and are thinking about suicide, get help right away. To get help:  · Call your local emergency services (911 in the [place]  · Call a suicide hotline to speak with a trained counselor who understands how you are feeling. The following is a list of suicide hotlines in the [place]. For a list of hotlines in [place], visit www.suicide.org/hotlines/international/dhyiqy-ylhohis-nvqvertc.html.  ¨ 2-457-666-TALK (1-993.813.4062).  ¨ 0-975-QZWNOKM (1-867.783.7775).  ¨ 1-122.248.7250. This is a hotline for Japanese speakers.  ¨ 3-680-509-4TTY (1-733.997.8484). This is a hotline for TTY users.  ¨ 3-257-9-U-IVONE (1-679.358.8587). This is a hotline for lesbian, jacome, bisexual, transgender, or questioning  youth.  · Contact a crisis center or a local suicide prevention center. To find a crisis center or suicide prevention center:  ¨ Call your local hospital, clinic, community service organization, mental health center, social service provider, or health department. Ask for assistance in connecting to a crisis center.  ¨ Visit www.suicidepreventionlifeline.org/getinvolved/ for a list of crisis centers in the [place], or visit www.suicideprevention.ca/qdicpabg-yvymi-mofxmmm/find-a-crisis-centre for a list of centers in [place].  · Visit the following websites:  ¨ National Suicide Prevention Lifeline: www.suicidepreventionlifeline.org  ¨ Hopeline: www.[employer]  ¨ American Foundation for Suicide Prevention: www.afsp.org  ¨ The [employer] (for lesbian, jacome, bisexual, transgender, or questioning youth): www.thetrevorproject.org  How can I help myself feel better?  · Promise yourself that you will not do anything drastic when you have suicidal feelings. Remember, there is hope. Many people have gotten through suicidal thoughts and feelings, and you will, too. You may have gotten through them before, and this proves that you can get through them again.  · Let family, friends, teachers, or counselors know how you are feeling. Try not to isolate yourself from those who care about you. Remember, they will want to help you. Talk with someone every day, even if you do not feel sociable. Face-to-face conversation is best.  · Call a mental health professional and see one regularly.  · Visit your primary health care provider every year.  · Eat a well-balanced diet, and space your meals so you eat regularly.  · Get plenty of rest.  · Avoid alcohol and drugs, and remove them from your home. They will only make you feel worse.  · If you are thinking of taking a lot of medicine, give your medicine to someone who can give it to you one day at a time. If you are on antidepressants and are concerned you will overdose,  "let your health care provider know so he or she can give you safer medicines. Ask your mental health professional about the possible side effects of any medicines you are taking.  · Remove weapons, poisons, knives, and anything else that could harm you from your home.  · Try to stick to routines. Follow a schedule every day. Put self-care on your schedule.  · Make a list of realistic goals, and cross them off when you achieve them. Accomplishments give a sense of worth.  · Wait until you are feeling better before doing the things you find difficult or unpleasant.  · Exercise if you are able. You will feel better if you exercise for even a half hour each day.  · Go out in the sun or into nature. This will help you recover from depression faster. If you have a favorite place to walk, go there.  · Do the things that have always given you pleasure. Play your favorite music, read a good book, paint a picture, play your favorite instrument, or do anything else that takes your mind off your depression if it is safe to do.  · Keep your living space well lit.  · When you are feeling well, write yourself a letter about tips and support that you can read when you are not feeling well.  · Remember that life’s difficulties can be sorted out with help. Conditions can be treated. You can work on thoughts and strategies that serve you well.  This information is not intended to replace advice given to you by your health care provider. Make sure you discuss any questions you have with your health care provider.  Document Released: 06/23/2004 Document Revised: 08/16/2017 Document Reviewed: 04/14/2015  [employer] Interactive Patient Education © 2017 [employer]              Prevent Falls in Your Home    \"Falling once doubles your chance of falling again\"        -Center for Disease Control and Prevention    Falls in the home can lead to serious injury (fractures, brain injuries), hospitalizations, increased medical costs, and could even " "be fatal.  The good news is, there are many precautions you can take to avoid falls in your home and help keep you safe:     · If prescribed an assistive device (walker, crutches), use as instructed by the healthcare provider\"   · Remove any tripping hazards from your home, including loose cords, throw rugs and clutter  · Keep a nightlight on in dark (hallways, bathrooms, etc)   · Get up slowly, to make sure you feel okay before getting up  · Be aware of any side effects of your medications: some medications may make you dizzy  · Place a non-skid rubber mat in your shower or tub-consider a shower bench or chair if unsteady on your feet  · Wear supportive shoes or non-skid socks when moving around  · Start an exercise program once approved by your provider.  If you are feeling weak following a hospital stay, talk to your doctor about home health or outpatient therapy programs designed to help rebuild your strength and endurance          Physical Therapy Discharge Instructions for Germaine Chaparro    1/11/2019    Level of Assist Required for Ambulation: Supervision on Stairs, Supervision on Curbs, Supervision on Flat Surfaces  Distance Patient May Ambulate:  (limited community distances)  Device Recommended for Ambulation: Front-Wheeled Walker  Level of Assist Required to Propel Wheelchair: Requires No Assist  Level of Assist Required for Transfers: Requires No Assist  Device Recommended for Transfers: Front-Wheeled Walker  Home Exercise Program: Refer to Home Exercise Program Handout for Details  Best of luck on your continued progress, Arlette!  Gay Bee PT, DPT  Occupational Therapy Discharge Instructions for Germaine Chaparro    1/11/2019    Level of Assist Required for Eating: Able to Complete Eating without Assist  Level of Assist Required for Grooming: Able to Complete Grooming without Assist  Level of Assist Required for Dressing: Able to Complete Dressing without Assist  Equipment for Dressing: Sock " Aid, Dressing Stick  Level of Assist Required for Toileting: Requires Physical Assist with Toileting (for BM hygiene for thoroughness)  Level of Assist Required for Toilet Transfer: Able to Complete Toilet Transfer without Assist  Level of Assist Required for Bathing: Able to Complete Bathing without Assist  Equipment for Bathing: Long Handled Sponge, Hand Held Shower Head, Grab Bars in Tub / Shower, Shower Chair  Level of Assist Required for Shower Transfer: Able to Complete Shower Transfer without Assist  Equipment for Shower Transfer: Grab Bars in Tub / Shower, Shower Chair  Level of Assist Required for Home Mgmt: Able to Complete Home Management without Assist  Level of Assist Required for Meal Prep: Able to Complete Meal Preparation without Assist  Comments: It was great working with you again Germaine! Keep up the good work at home.

## 2019-01-13 NOTE — CONSULTS
DATE OF SERVICE:  01/08/2019    BEHAVIORAL MEDICINE EVALUATION    BRIEF HISTORY OF PRESENTING COMPLAINTS:  The patient is a 73-year-old white    female who is referred for a behavioral medicine evaluation by Dr. Bajwa.  The patient was transferred to rehab from acute where she underwent   a L3 nerve root decompression, L4 redo laminectomy, L5 laminectomy,   arthrodesis from L3-L5 and instrumented fusion from L3-L5.  Patient   preoperatively was diagnosed with spinal stenosis of the lumbar region with   neurogenic claudication.  The patient was stabilized acutely and then sent to   rehab to address her general debility.    PAST MEDICAL HISTORY:  Significant for anesthesia, arthritis, asthma, breath   shortness, bronchitis, congestive heart failure, diabetes, heart murmur, heart   valve disease, hypercholesterolemia, hypertension, hypothyroidism, chronic   low back pain, renal disorder, snoring, urinary incontinence and urinary   bladder disorder.    PSYCHOLOGICAL STATUS:    MENTAL STATUS EXAMINATION:  The patient is a well-nourished, obese female of   medium stature who appeared older than her stated age of 73.  At presentation,   the patient was alert.  She was sitting in a wheelchair next to her bed when   approached.  The patient oriented well to my presence.  The patient was kempt   in appearance.  She was dressed in hospital garb.  Her manner of presentation   was cooperative and friendly.    The patient demonstrated no gross cognitive deficits.  She was well oriented   to time, place, and person.  Her language was logical and goal oriented, and   her speech was normal for rate and rhythm.  The patient's concentration and   memory functioning appeared intact.    The patient's affect was constricted, stable and mildly intense.  She related   well.  Mood appeared dysphoric, but appropriate to the context.    There was no evidence of delusional or perceptual disturbance.  Also, the   patient showed no  unusual pain or motor behavior during the interview.    SPECIFIC BEHAVIORAL COMPLAINTS:  The patient identified mild symptoms of   generalized mood disturbance.  She reported in the past several days she has   felt worried, and somewhat sad.  She said she has lost confidence and somewhat   discouraged about the future.  Patient denied any strong feelings of guilt,   worthlessness, or hopelessness.  She reported no thoughts of wanting to die.    Patient said her pain ranges between 3 and 6/10.  She says she is asking for   medications, but not routinely.  Patient reported that she believes her pain   has slightly improved since leaving the acute hospital.    Other problems mentioned by the patient included diminished appetite, low   levels of energy and restless sleep.    The patient denied any interpersonal discord or discomfort.  She says she is   very good support from her [employer] group.  She also reported no problems   managing her day-to-day stressors.    The patient denied any use of alcohol or tobacco.    PSYCHIATRIC HISTORY:  The patient denied any history significant for   psychiatric disturbance or treatment including in or outpatient care.    PSYCHOMETRIC TESTING:  The patient was administered 2 psychometric testing and   2 screening instruments.  The PS/PC-R revealed mild symptoms of generalized   mood disturbance.  Her CDR survey showed no problems with level of   consciousness, attention, thinking, perception or speech.  She did reveal some   diminishment of her energy level and restless sleep.  She also reported   problems with behavioral activation.  She admitted to symptoms of mild   generalized mood and disturbance, loss of appetite and moderate levels of back   pain.    Patient was screened for any elder abuse or risk of suicide.  There is no   strong evidence for either problem.    SOCIAL HISTORY:  The patient is  and retired.  She lives with a   roommate in [facility]  [place].    IMPRESSION:  Adjustment disorder with mixed emotional features.    RECOMMENDATIONS:  Patient will be followed for status and supportive care.       ____________________________________     CHINA VALENCIA, PHD    ANGELIC / JENNY    DD:  01/13/2019 09:29:42  DT:  01/13/2019 10:01:58    D#:  [id number]  Job#:  [id number]

## 2019-01-14 NOTE — DISCHARGE PLANNING
Case Management;  Late entry for 1/11/19.  Met with patient to review d/c plans for Saturday.  [facility] referred and ready to follow.  Follow up appointments with neurosurgery and PCP reviewed.  No additional dme needed.  Patient verbalizes agreement with all plans and understanding of next steps.  While hospitalized, I provided support, education, updates from team conference discussion, communication with care team and availability for questions during hours of operation.      CASE MANAGEMENT PLAN OF CARE   Individualized Goals:   1. I will follow to confirm available sources for follow up therapy in patient's area.  2. I will provide update from weekly team conference discussion.  3. Case management will follow for status of oxygen weaning.     Outcomes:  1. We have referred patient for outpatient therapy with [facility] and they are ready to follow.  2. I provided updates from team conference and reviewed targets.  3. Patient has been weaned from home oxygen and able to go home without it.

## 2019-10-02 ENCOUNTER — APPOINTMENT (RX ONLY)
Dept: URBAN - NONMETROPOLITAN AREA CLINIC 1 | Facility: CLINIC | Age: 74
Setting detail: DERMATOLOGY
End: 2019-10-02

## 2019-10-02 DIAGNOSIS — L03.03 CELLULITIS OF TOE: ICD-10-CM | Status: IMPROVED

## 2019-10-02 DIAGNOSIS — L82.0 INFLAMED SEBORRHEIC KERATOSIS: ICD-10-CM

## 2019-10-02 DIAGNOSIS — Z85.828 PERSONAL HISTORY OF OTHER MALIGNANT NEOPLASM OF SKIN: ICD-10-CM

## 2019-10-02 DIAGNOSIS — L30.9 DERMATITIS, UNSPECIFIED: ICD-10-CM

## 2019-10-02 DIAGNOSIS — L72.0 EPIDERMAL CYST: ICD-10-CM

## 2019-10-02 PROBLEM — L03.031 CELLULITIS OF RIGHT TOE: Status: ACTIVE | Noted: 2019-10-02

## 2019-10-02 PROCEDURE — ? PRESCRIPTION

## 2019-10-02 PROCEDURE — ? VITAMIN B3 COUNSELING

## 2019-10-02 PROCEDURE — 99214 OFFICE O/P EST MOD 30 MIN: CPT | Mod: 25

## 2019-10-02 PROCEDURE — ? COUNSELING

## 2019-10-02 PROCEDURE — ? LIQUID NITROGEN

## 2019-10-02 PROCEDURE — 17110 DESTRUCTION B9 LES UP TO 14: CPT

## 2019-10-02 RX ORDER — MUPIROCIN 20 MG/G
OINTMENT TOPICAL
Qty: 1 | Refills: 3 | Status: ERX | COMMUNITY
Start: 2019-10-02

## 2019-10-02 RX ORDER — HYDROCORTISONE 25 MG/G
CREAM TOPICAL
Qty: 1 | Refills: 3 | Status: ERX | COMMUNITY
Start: 2019-10-02

## 2019-10-02 RX ADMIN — MUPIROCIN 1: 20 OINTMENT TOPICAL at 00:00

## 2019-10-02 RX ADMIN — HYDROCORTISONE 1: 25 CREAM TOPICAL at 00:00

## 2019-10-02 ASSESSMENT — LOCATION SIMPLE DESCRIPTION DERM
LOCATION SIMPLE: RIGHT GREAT TOE
LOCATION SIMPLE: LEFT AXILLARY VAULT
LOCATION SIMPLE: LEFT AXILLARY VAULT
LOCATION SIMPLE: RIGHT LIP
LOCATION SIMPLE: LEFT UPPER BACK
LOCATION SIMPLE: LEFT LOWER BACK
LOCATION SIMPLE: RIGHT GREAT TOE
LOCATION SIMPLE: RIGHT LOWER BACK
LOCATION SIMPLE: LEFT THIGH
LOCATION SIMPLE: RIGHT SHOULDER
LOCATION SIMPLE: RIGHT UPPER BACK
LOCATION SIMPLE: LEFT UPPER ARM
LOCATION SIMPLE: UPPER BACK
LOCATION SIMPLE: NOSE

## 2019-10-02 ASSESSMENT — LOCATION ZONE DERM
LOCATION ZONE: ARM
LOCATION ZONE: TOE
LOCATION ZONE: ARM
LOCATION ZONE: AXILLAE
LOCATION ZONE: NOSE
LOCATION ZONE: LEG
LOCATION ZONE: AXILLAE
LOCATION ZONE: LIP
LOCATION ZONE: TRUNK
LOCATION ZONE: TOE

## 2019-10-02 ASSESSMENT — LOCATION DETAILED DESCRIPTION DERM
LOCATION DETAILED: SUPERIOR THORACIC SPINE
LOCATION DETAILED: RIGHT INFERIOR MEDIAL MIDBACK
LOCATION DETAILED: LEFT MEDIAL UPPER BACK
LOCATION DETAILED: RIGHT MEDIAL GREAT TOE
LOCATION DETAILED: INFERIOR THORACIC SPINE
LOCATION DETAILED: RIGHT POSTERIOR SHOULDER
LOCATION DETAILED: LEFT SUPERIOR UPPER BACK
LOCATION DETAILED: LEFT AXILLARY VAULT
LOCATION DETAILED: LEFT AXILLARY VAULT
LOCATION DETAILED: LEFT SUPERIOR LATERAL MIDBACK
LOCATION DETAILED: RIGHT INFERIOR UPPER BACK
LOCATION DETAILED: RIGHT MID-UPPER BACK
LOCATION DETAILED: LEFT ANTERIOR PROXIMAL THIGH
LOCATION DETAILED: LEFT SUPERIOR MEDIAL LOWER BACK
LOCATION DETAILED: NASAL DORSUM
LOCATION DETAILED: LEFT SUPERIOR LATERAL UPPER BACK
LOCATION DETAILED: RIGHT DISTAL PLANTAR GREAT TOE
LOCATION DETAILED: LEFT ANTERIOR MEDIAL PROXIMAL UPPER ARM
LOCATION DETAILED: RIGHT UPPER CUTANEOUS LIP

## 2019-10-02 NOTE — PROCEDURE: LIQUID NITROGEN
Render Note In Bullet Format When Appropriate: No
Render Post-Care Instructions In Note?: yes
Medical Necessity Clause: This procedure was medically necessary because the lesions that were treated were:
Detail Level: Simple
Consent: The patient's consent was obtained including but not limited to risks of crusting, scabbing, blistering, scarring, darker or lighter pigmentary change, recurrence, incomplete removal and infection.
Medical Necessity Information: It is in your best interest to select a reason for this procedure from the list below. All of these items fulfill various CMS LCD requirements except the new and changing color options.
Post-Care Instructions: I reviewed with the patient in detail post-care instructions. Patient is to wear sunprotection, and avoid picking at any of the treated lesions. Pt may apply Vaseline to crusted or scabbing areas.
Number Of Freeze-Thaw Cycles: 2 freeze-thaw cycles

## 2019-10-02 NOTE — HPI: RASH
How Severe Is Your Rash?: mild
Is This A New Presentation, Or A Follow-Up?: Rash
Additional History: Patient has itchy spots on her legs also.

## 2019-10-02 NOTE — HPI: INFECTION (PARONYCHIA)
How Severe Is It?: mild
Is This A New Presentation, Or A Follow-Up?: Follow Up Paronychia
Additional History: Patient had ingrown mail treated by a podiatrist (Dr. Tian)  in [place] 2 weeks ago.  Toe is still sore, but feel better.  She has been using triple antibiotic ointment on it.

## 2019-10-02 NOTE — PROCEDURE: COUNSELING
Detail Level: Detailed
Patient Specific Counseling (Will Not Stick From Patient To Patient): Patient advised to follow up with her podiatrist.
Detail Level: Zone

## 2020-07-10 NOTE — PROGRESS NOTES
1235 Patient's sitting up in a chair, having lunch. No distress noted.   FAMILY HISTORY:  Family history of coronary artery disease    Father  Still living? No  Cancer, Age at diagnosis: Age Unknown

## 2022-06-02 ENCOUNTER — APPOINTMENT (RX ONLY)
Dept: URBAN - NONMETROPOLITAN AREA CLINIC 1 | Facility: CLINIC | Age: 77
Setting detail: DERMATOLOGY
End: 2022-06-02

## 2022-06-02 DIAGNOSIS — L20.89 OTHER ATOPIC DERMATITIS: ICD-10-CM

## 2022-06-02 DIAGNOSIS — L70.8 OTHER ACNE: ICD-10-CM

## 2022-06-02 DIAGNOSIS — L72.8 OTHER FOLLICULAR CYSTS OF THE SKIN AND SUBCUTANEOUS TISSUE: ICD-10-CM

## 2022-06-02 DIAGNOSIS — L82.1 OTHER SEBORRHEIC KERATOSIS: ICD-10-CM

## 2022-06-02 DIAGNOSIS — L57.0 ACTINIC KERATOSIS: ICD-10-CM

## 2022-06-02 DIAGNOSIS — L73.8 OTHER SPECIFIED FOLLICULAR DISORDERS: ICD-10-CM

## 2022-06-02 DIAGNOSIS — B35.3 TINEA PEDIS: ICD-10-CM

## 2022-06-02 PROBLEM — L20.84 INTRINSIC (ALLERGIC) ECZEMA: Status: ACTIVE | Noted: 2022-06-02

## 2022-06-02 PROCEDURE — 99213 OFFICE O/P EST LOW 20 MIN: CPT | Mod: 25

## 2022-06-02 PROCEDURE — ? PRESCRIPTION

## 2022-06-02 PROCEDURE — ? NOTED ON EXAM BUT NOT TREATED

## 2022-06-02 PROCEDURE — 17000 DESTRUCT PREMALG LESION: CPT

## 2022-06-02 PROCEDURE — ? COSMETIC EXTRACTIONS

## 2022-06-02 PROCEDURE — ? LIQUID NITROGEN

## 2022-06-02 PROCEDURE — ? OBSERVATION

## 2022-06-02 PROCEDURE — ? COUNSELING

## 2022-06-02 PROCEDURE — 17003 DESTRUCT PREMALG LES 2-14: CPT

## 2022-06-02 RX ORDER — TRIAMCINOLONE ACETONIDE 1 MG/G
CREAM TOPICAL BID
Qty: 80 | Refills: 3 | Status: ERX | COMMUNITY
Start: 2022-06-02

## 2022-06-02 RX ADMIN — TRIAMCINOLONE ACETONIDE: 1 CREAM TOPICAL at 00:00

## 2022-06-02 ASSESSMENT — LOCATION SIMPLE DESCRIPTION DERM
LOCATION SIMPLE: UPPER BACK
LOCATION SIMPLE: LEFT FOOT
LOCATION SIMPLE: LEFT CHEEK
LOCATION SIMPLE: RIGHT CHEEK
LOCATION SIMPLE: LEFT HAND
LOCATION SIMPLE: LEFT BUTTOCK
LOCATION SIMPLE: RIGHT FOOT
LOCATION SIMPLE: RIGHT FOREARM
LOCATION SIMPLE: LEFT LOWER BACK
LOCATION SIMPLE: RIGHT LIP

## 2022-06-02 ASSESSMENT — LOCATION DETAILED DESCRIPTION DERM
LOCATION DETAILED: RIGHT DISTAL DORSAL FOREARM
LOCATION DETAILED: LEFT RADIAL DORSAL HAND
LOCATION DETAILED: LEFT INFERIOR MEDIAL MALAR CHEEK
LOCATION DETAILED: RIGHT INFERIOR MEDIAL MALAR CHEEK
LOCATION DETAILED: LEFT MEDIAL DORSAL FOOT
LOCATION DETAILED: RIGHT MEDIAL DORSAL FOOT
LOCATION DETAILED: RIGHT DORSAL FOOT
LOCATION DETAILED: INFERIOR THORACIC SPINE
LOCATION DETAILED: RIGHT UPPER CUTANEOUS LIP
LOCATION DETAILED: LEFT INFERIOR CENTRAL MALAR CHEEK
LOCATION DETAILED: LEFT INFERIOR MEDIAL MIDBACK
LOCATION DETAILED: LEFT BUTTOCK

## 2022-06-02 ASSESSMENT — LOCATION ZONE DERM
LOCATION ZONE: FEET
LOCATION ZONE: HAND
LOCATION ZONE: ARM
LOCATION ZONE: LIP
LOCATION ZONE: FACE
LOCATION ZONE: TRUNK

## 2022-06-02 NOTE — PROCEDURE: MIPS QUALITY
Quality 111:Pneumonia Vaccination Status For Older Adults: Pneumococcal vaccine administered on or after patient’s 60th birthday and before the end of the measurement period
Detail Level: Generalized
Quality 226: Preventive Care And Screening: Tobacco Use: Screening And Cessation Intervention: Patient screened for tobacco use and is an ex/non-smoker
Quality 130: Documentation Of Current Medications In The Medical Record: Current Medications Documented

## 2022-06-02 NOTE — PROCEDURE: COSMETIC EXTRACTIONS
Detail Level: Detailed
Price (Use Numbers Only, No Special Characters Or $): 0
Render The Number Of Extractions: Yes
Anesthesia Volume In Cc: 0.3
Post-Care Instructions: I reviewed with the patient in detail post-care instructions. Patient is to wear sunprotection, and avoid picking at any of the treated lesions. Pt may apply Vaseline to crusted or scabbing areas.
Consent: The patient's consent was obtained including but not limited to risks of crusting, scabbing, blistering, scarring, darker or lighter pigmentary change, recurrence, incomplete removal and infection.

## 2022-06-02 NOTE — PROCEDURE: LIQUID NITROGEN
Show Applicator Variable?: Yes
Number Of Freeze-Thaw Cycles: 2 freeze-thaw cycles
Duration Of Freeze Thaw-Cycle (Seconds): 0
Render Note In Bullet Format When Appropriate: No
Detail Level: Simple
Post-Care Instructions: I reviewed with the patient in detail post-care instructions. Patient is to wear sunprotection, and avoid picking at any of the treated lesions. Pt may apply Vaseline to crusted or scabbing areas.
Consent: The patient's consent was obtained including but not limited to risks of crusting, scabbing, blistering, scarring, darker or lighter pigmentary change, recurrence, incomplete removal and infection.

## 2022-09-26 ENCOUNTER — HOSPITAL ENCOUNTER (INPATIENT)
Facility: MEDICAL CENTER | Age: 77
LOS: 7 days | DRG: 640 | End: 2022-10-03
Attending: HOSPITALIST | Admitting: INTERNAL MEDICINE
Payer: MEDICARE

## 2022-09-26 DIAGNOSIS — E11.65 TYPE 2 DIABETES MELLITUS WITH HYPERGLYCEMIA, WITH LONG-TERM CURRENT USE OF INSULIN (HCC): ICD-10-CM

## 2022-09-26 DIAGNOSIS — R20.8 DYSESTHESIA: ICD-10-CM

## 2022-09-26 DIAGNOSIS — R82.81 BACTERIURIA WITH PYURIA: ICD-10-CM

## 2022-09-26 DIAGNOSIS — E55.9 VITAMIN D DEFICIENCY: ICD-10-CM

## 2022-09-26 DIAGNOSIS — I50.812 CHRONIC RIGHT-SIDED CONGESTIVE HEART FAILURE (HCC): ICD-10-CM

## 2022-09-26 DIAGNOSIS — T81.31XD DEHISCENCE OF OPERATIVE WOUND, SUBSEQUENT ENCOUNTER: ICD-10-CM

## 2022-09-26 DIAGNOSIS — N28.1 RENAL CYSTS, ACQUIRED, BILATERAL: ICD-10-CM

## 2022-09-26 DIAGNOSIS — C50.919 MALIGNANT NEOPLASM OF FEMALE BREAST, UNSPECIFIED ESTROGEN RECEPTOR STATUS, UNSPECIFIED LATERALITY, UNSPECIFIED SITE OF BREAST (HCC): ICD-10-CM

## 2022-09-26 DIAGNOSIS — I27.20 PULMONARY HTN (HCC): ICD-10-CM

## 2022-09-26 DIAGNOSIS — K59.1 FUNCTIONAL DIARRHEA: ICD-10-CM

## 2022-09-26 DIAGNOSIS — R82.71 BACTERIURIA WITH PYURIA: ICD-10-CM

## 2022-09-26 DIAGNOSIS — Z00.00 HEALTH CARE MAINTENANCE: ICD-10-CM

## 2022-09-26 DIAGNOSIS — M48.02 CERVICAL STENOSIS OF SPINE: ICD-10-CM

## 2022-09-26 DIAGNOSIS — R60.0 PEDAL EDEMA: ICD-10-CM

## 2022-09-26 DIAGNOSIS — D64.89 ANEMIA DUE TO OTHER CAUSE, NOT CLASSIFIED: ICD-10-CM

## 2022-09-26 DIAGNOSIS — I10 PRIMARY HYPERTENSION: ICD-10-CM

## 2022-09-26 DIAGNOSIS — G47.33 OSA (OBSTRUCTIVE SLEEP APNEA): ICD-10-CM

## 2022-09-26 DIAGNOSIS — R62.7 FAILURE TO THRIVE IN ADULT: ICD-10-CM

## 2022-09-26 DIAGNOSIS — Z74.09 IMPAIRED MOBILITY AND ADLS: ICD-10-CM

## 2022-09-26 DIAGNOSIS — G95.9 CERVICAL MYELOPATHY (HCC): ICD-10-CM

## 2022-09-26 DIAGNOSIS — M51.17 INTERVERTEBRAL DISC DISORDER WITH RADICULOPATHY OF LUMBOSACRAL REGION: ICD-10-CM

## 2022-09-26 DIAGNOSIS — G96.00 POSTOPERATIVE CSF LEAK: ICD-10-CM

## 2022-09-26 DIAGNOSIS — E78.5 DYSLIPIDEMIA: ICD-10-CM

## 2022-09-26 DIAGNOSIS — R52 PAIN: ICD-10-CM

## 2022-09-26 DIAGNOSIS — Z78.9 IMPAIRED MOBILITY AND ADLS: ICD-10-CM

## 2022-09-26 DIAGNOSIS — E83.42 HYPOMAGNESEMIA: ICD-10-CM

## 2022-09-26 DIAGNOSIS — M48.02 SPINAL STENOSIS IN CERVICAL REGION: ICD-10-CM

## 2022-09-26 DIAGNOSIS — Z79.4 TYPE 2 DIABETES MELLITUS WITH HYPERGLYCEMIA, WITH LONG-TERM CURRENT USE OF INSULIN (HCC): ICD-10-CM

## 2022-09-26 DIAGNOSIS — N28.1 RENAL CYST, NATIVE, HEMORRHAGE: ICD-10-CM

## 2022-09-26 DIAGNOSIS — R53.81 DEBILITY: ICD-10-CM

## 2022-09-26 DIAGNOSIS — M48.062 SPINAL STENOSIS OF LUMBAR REGION WITH NEUROGENIC CLAUDICATION: ICD-10-CM

## 2022-09-26 DIAGNOSIS — N18.6 END STAGE RENAL FAILURE ON DIALYSIS (HCC): ICD-10-CM

## 2022-09-26 DIAGNOSIS — J96.02 ACUTE RESPIRATORY FAILURE WITH HYPERCAPNIA (HCC): ICD-10-CM

## 2022-09-26 DIAGNOSIS — N18.2 CHRONIC KIDNEY DISEASE, STAGE II (MILD): ICD-10-CM

## 2022-09-26 DIAGNOSIS — G97.82 POSTOPERATIVE CSF LEAK: ICD-10-CM

## 2022-09-26 DIAGNOSIS — Z99.2 END STAGE RENAL FAILURE ON DIALYSIS (HCC): ICD-10-CM

## 2022-09-26 DIAGNOSIS — N28.9 RENAL INSUFFICIENCY: ICD-10-CM

## 2022-09-26 DIAGNOSIS — E78.2 MIXED HYPERLIPIDEMIA: ICD-10-CM

## 2022-09-26 DIAGNOSIS — N28.89 RENAL CYST, NATIVE, HEMORRHAGE: ICD-10-CM

## 2022-09-26 LAB
ANION GAP SERPL CALC-SCNC: 15 MMOL/L (ref 7–16)
BASOPHILS # BLD AUTO: 0.9 % (ref 0–1.8)
BASOPHILS # BLD: 0.1 K/UL (ref 0–0.12)
BUN SERPL-MCNC: 45 MG/DL (ref 8–22)
CALCIUM SERPL-MCNC: 8.8 MG/DL (ref 8.4–10.2)
CHLORIDE SERPL-SCNC: 93 MMOL/L (ref 96–112)
CO2 SERPL-SCNC: 24 MMOL/L (ref 20–33)
CREAT SERPL-MCNC: 7.38 MG/DL (ref 0.5–1.4)
EOSINOPHIL # BLD AUTO: 0.35 K/UL (ref 0–0.51)
EOSINOPHIL NFR BLD: 3.1 % (ref 0–6.9)
ERYTHROCYTE [DISTWIDTH] IN BLOOD BY AUTOMATED COUNT: 46.4 FL (ref 35.9–50)
GFR SERPLBLD CREATININE-BSD FMLA CKD-EPI: 5 ML/MIN/1.73 M 2
GLUCOSE BLD STRIP.AUTO-MCNC: 218 MG/DL (ref 65–99)
GLUCOSE BLD STRIP.AUTO-MCNC: 219 MG/DL (ref 65–99)
GLUCOSE SERPL-MCNC: 169 MG/DL (ref 65–99)
HCT VFR BLD AUTO: 26.9 % (ref 37–47)
HGB BLD-MCNC: 8.7 G/DL (ref 12–16)
IMM GRANULOCYTES # BLD AUTO: 0.06 K/UL (ref 0–0.11)
IMM GRANULOCYTES NFR BLD AUTO: 0.5 % (ref 0–0.9)
LYMPHOCYTES # BLD AUTO: 2.44 K/UL (ref 1–4.8)
LYMPHOCYTES NFR BLD: 21.3 % (ref 22–41)
MAGNESIUM SERPL-MCNC: 1.7 MG/DL (ref 1.5–2.5)
MCH RBC QN AUTO: 32 PG (ref 27–33)
MCHC RBC AUTO-ENTMCNC: 32.3 G/DL (ref 33.6–35)
MCV RBC AUTO: 98.9 FL (ref 81.4–97.8)
MONOCYTES # BLD AUTO: 1.26 K/UL (ref 0–0.85)
MONOCYTES NFR BLD AUTO: 11 % (ref 0–13.4)
NEUTROPHILS # BLD AUTO: 7.23 K/UL (ref 2–7.15)
NEUTROPHILS NFR BLD: 63.2 % (ref 44–72)
NRBC # BLD AUTO: 0 K/UL
NRBC BLD-RTO: 0 /100 WBC
PHOSPHATE SERPL-MCNC: 2.5 MG/DL (ref 2.5–4.5)
PLATELET # BLD AUTO: 213 K/UL (ref 164–446)
PMV BLD AUTO: 11.9 FL (ref 9–12.9)
POTASSIUM SERPL-SCNC: 3.9 MMOL/L (ref 3.6–5.5)
RBC # BLD AUTO: 2.72 M/UL (ref 4.2–5.4)
SODIUM SERPL-SCNC: 132 MMOL/L (ref 135–145)
WBC # BLD AUTO: 11.4 K/UL (ref 4.8–10.8)

## 2022-09-26 PROCEDURE — 85025 COMPLETE CBC W/AUTO DIFF WBC: CPT

## 2022-09-26 PROCEDURE — 80048 BASIC METABOLIC PNL TOTAL CA: CPT

## 2022-09-26 PROCEDURE — 84100 ASSAY OF PHOSPHORUS: CPT

## 2022-09-26 PROCEDURE — 83735 ASSAY OF MAGNESIUM: CPT

## 2022-09-26 PROCEDURE — A9270 NON-COVERED ITEM OR SERVICE: HCPCS | Performed by: INTERNAL MEDICINE

## 2022-09-26 PROCEDURE — 99223 1ST HOSP IP/OBS HIGH 75: CPT | Mod: AI | Performed by: INTERNAL MEDICINE

## 2022-09-26 PROCEDURE — 80074 ACUTE HEPATITIS PANEL: CPT

## 2022-09-26 PROCEDURE — 94760 N-INVAS EAR/PLS OXIMETRY 1: CPT

## 2022-09-26 PROCEDURE — 36415 COLL VENOUS BLD VENIPUNCTURE: CPT

## 2022-09-26 PROCEDURE — 86706 HEP B SURFACE ANTIBODY: CPT

## 2022-09-26 PROCEDURE — 82962 GLUCOSE BLOOD TEST: CPT

## 2022-09-26 PROCEDURE — 770006 HCHG ROOM/CARE - MED/SURG/GYN SEMI*

## 2022-09-26 PROCEDURE — 700102 HCHG RX REV CODE 250 W/ 637 OVERRIDE(OP): Performed by: INTERNAL MEDICINE

## 2022-09-26 RX ORDER — GABAPENTIN 300 MG/1
300 CAPSULE ORAL DAILY
Status: DISCONTINUED | OUTPATIENT
Start: 2022-09-27 | End: 2022-10-03 | Stop reason: HOSPADM

## 2022-09-26 RX ORDER — BISACODYL 10 MG
10 SUPPOSITORY, RECTAL RECTAL
Status: DISCONTINUED | OUTPATIENT
Start: 2022-09-26 | End: 2022-10-03 | Stop reason: HOSPADM

## 2022-09-26 RX ORDER — GABAPENTIN 300 MG/1
600 CAPSULE ORAL EVERY EVENING
Status: DISCONTINUED | OUTPATIENT
Start: 2022-09-26 | End: 2022-09-28

## 2022-09-26 RX ORDER — ONDANSETRON 2 MG/ML
4 INJECTION INTRAMUSCULAR; INTRAVENOUS EVERY 4 HOURS PRN
Status: DISCONTINUED | OUTPATIENT
Start: 2022-09-26 | End: 2022-10-03 | Stop reason: HOSPADM

## 2022-09-26 RX ORDER — CARVEDILOL 6.25 MG/1
12.5 TABLET ORAL 2 TIMES DAILY WITH MEALS
Status: DISCONTINUED | OUTPATIENT
Start: 2022-09-26 | End: 2022-10-03 | Stop reason: HOSPADM

## 2022-09-26 RX ORDER — AMLODIPINE BESYLATE 5 MG/1
10 TABLET ORAL
Status: DISCONTINUED | OUTPATIENT
Start: 2022-09-27 | End: 2022-09-27

## 2022-09-26 RX ORDER — HEPARIN SODIUM 5000 [USP'U]/ML
5000 INJECTION, SOLUTION INTRAVENOUS; SUBCUTANEOUS EVERY 8 HOURS
Status: DISCONTINUED | OUTPATIENT
Start: 2022-09-27 | End: 2022-10-03 | Stop reason: HOSPADM

## 2022-09-26 RX ORDER — PROMETHAZINE HYDROCHLORIDE 25 MG/1
12.5 SUPPOSITORY RECTAL EVERY 6 HOURS PRN
Status: DISCONTINUED | OUTPATIENT
Start: 2022-09-26 | End: 2022-10-03 | Stop reason: HOSPADM

## 2022-09-26 RX ORDER — ONDANSETRON 4 MG/1
4 TABLET, ORALLY DISINTEGRATING ORAL EVERY 4 HOURS PRN
Status: DISCONTINUED | OUTPATIENT
Start: 2022-09-26 | End: 2022-10-03 | Stop reason: HOSPADM

## 2022-09-26 RX ORDER — POLYETHYLENE GLYCOL 3350 17 G/17G
1 POWDER, FOR SOLUTION ORAL
Status: DISCONTINUED | OUTPATIENT
Start: 2022-09-26 | End: 2022-10-03 | Stop reason: HOSPADM

## 2022-09-26 RX ORDER — AMITRIPTYLINE HYDROCHLORIDE 10 MG/1
10 TABLET, FILM COATED ORAL 2 TIMES DAILY
Status: DISCONTINUED | OUTPATIENT
Start: 2022-09-27 | End: 2022-10-03 | Stop reason: HOSPADM

## 2022-09-26 RX ORDER — TAMOXIFEN CITRATE 10 MG/1
20 TABLET ORAL DAILY
Status: DISCONTINUED | OUTPATIENT
Start: 2022-09-27 | End: 2022-10-03 | Stop reason: HOSPADM

## 2022-09-26 RX ORDER — LEVOTHYROXINE SODIUM 0.1 MG/1
100 TABLET ORAL
Status: DISCONTINUED | OUTPATIENT
Start: 2022-09-27 | End: 2022-10-03 | Stop reason: HOSPADM

## 2022-09-26 RX ORDER — FUROSEMIDE 40 MG/1
80 TABLET ORAL
Status: DISCONTINUED | OUTPATIENT
Start: 2022-09-27 | End: 2022-10-03 | Stop reason: HOSPADM

## 2022-09-26 RX ORDER — LABETALOL HYDROCHLORIDE 5 MG/ML
10 INJECTION, SOLUTION INTRAVENOUS EVERY 4 HOURS PRN
Status: DISCONTINUED | OUTPATIENT
Start: 2022-09-26 | End: 2022-10-03 | Stop reason: HOSPADM

## 2022-09-26 RX ORDER — AMOXICILLIN 250 MG
2 CAPSULE ORAL 2 TIMES DAILY
Status: DISCONTINUED | OUTPATIENT
Start: 2022-09-26 | End: 2022-10-03 | Stop reason: HOSPADM

## 2022-09-26 RX ORDER — PROCHLORPERAZINE EDISYLATE 5 MG/ML
10 INJECTION INTRAMUSCULAR; INTRAVENOUS EVERY 6 HOURS PRN
Status: DISCONTINUED | OUTPATIENT
Start: 2022-09-26 | End: 2022-10-03 | Stop reason: HOSPADM

## 2022-09-26 RX ORDER — ACETAMINOPHEN 325 MG/1
650 TABLET ORAL EVERY 6 HOURS PRN
Status: DISCONTINUED | OUTPATIENT
Start: 2022-09-26 | End: 2022-10-03 | Stop reason: HOSPADM

## 2022-09-26 RX ADMIN — INSULIN HUMAN 2 UNITS: 100 INJECTION, SOLUTION PARENTERAL at 20:41

## 2022-09-26 RX ADMIN — SENNOSIDES AND DOCUSATE SODIUM 2 TABLET: 50; 8.6 TABLET ORAL at 18:38

## 2022-09-26 RX ADMIN — CARVEDILOL 12.5 MG: 6.25 TABLET, FILM COATED ORAL at 18:38

## 2022-09-26 RX ADMIN — GABAPENTIN 600 MG: 300 CAPSULE ORAL at 18:38

## 2022-09-26 ASSESSMENT — PATIENT HEALTH QUESTIONNAIRE - PHQ9
1. LITTLE INTEREST OR PLEASURE IN DOING THINGS: NOT AT ALL
2. FEELING DOWN, DEPRESSED, IRRITABLE, OR HOPELESS: NOT AT ALL
1. LITTLE INTEREST OR PLEASURE IN DOING THINGS: NOT AT ALL
SUM OF ALL RESPONSES TO PHQ9 QUESTIONS 1 AND 2: 0
SUM OF ALL RESPONSES TO PHQ9 QUESTIONS 1 AND 2: 0
2. FEELING DOWN, DEPRESSED, IRRITABLE, OR HOPELESS: NOT AT ALL

## 2022-09-26 ASSESSMENT — COGNITIVE AND FUNCTIONAL STATUS - GENERAL
TOILETING: A LITTLE
DRESSING REGULAR LOWER BODY CLOTHING: A LITTLE
DAILY ACTIVITIY SCORE: 18
STANDING UP FROM CHAIR USING ARMS: A LITTLE
DRESSING REGULAR UPPER BODY CLOTHING: A LITTLE
HELP NEEDED FOR BATHING: A LITTLE
MOBILITY SCORE: 16
MOVING FROM LYING ON BACK TO SITTING ON SIDE OF FLAT BED: A LITTLE
MOVING TO AND FROM BED TO CHAIR: A LITTLE
EATING MEALS: A LITTLE
SUGGESTED CMS G CODE MODIFIER MOBILITY: CK
CLIMB 3 TO 5 STEPS WITH RAILING: A LOT
TURNING FROM BACK TO SIDE WHILE IN FLAT BAD: A LITTLE
WALKING IN HOSPITAL ROOM: A LOT
SUGGESTED CMS G CODE MODIFIER DAILY ACTIVITY: CK
PERSONAL GROOMING: A LITTLE

## 2022-09-26 ASSESSMENT — LIFESTYLE VARIABLES
HAVE PEOPLE ANNOYED YOU BY CRITICIZING YOUR DRINKING: NO
TOTAL SCORE: 0
ON A TYPICAL DAY WHEN YOU DRINK ALCOHOL HOW MANY DRINKS DO YOU HAVE: 0
AVERAGE NUMBER OF DAYS PER WEEK YOU HAVE A DRINK CONTAINING ALCOHOL: 0
TOTAL SCORE: 0
TOTAL SCORE: 0
EVER HAD A DRINK FIRST THING IN THE MORNING TO STEADY YOUR NERVES TO GET RID OF A HANGOVER: NO
ALCOHOL_USE: NO
HAVE YOU EVER FELT YOU SHOULD CUT DOWN ON YOUR DRINKING: NO
EVER FELT BAD OR GUILTY ABOUT YOUR DRINKING: NO
HOW MANY TIMES IN THE PAST YEAR HAVE YOU HAD 5 OR MORE DRINKS IN A DAY: 0
CONSUMPTION TOTAL: NEGATIVE

## 2022-09-26 ASSESSMENT — ENCOUNTER SYMPTOMS
NAUSEA: 0
ABDOMINAL PAIN: 0
WEAKNESS: 1
SHORTNESS OF BREATH: 0
DIZZINESS: 0

## 2022-09-26 ASSESSMENT — PAIN DESCRIPTION - PAIN TYPE
TYPE: ACUTE PAIN
TYPE: ACUTE PAIN

## 2022-09-26 NOTE — PROGRESS NOTES
Triage Officer Note    Admitted to [facility] with generalized weakness and failure to thrive  Neg work up  Being sent here as needs placement and is on HD; they can't dialyze her

## 2022-09-27 ENCOUNTER — APPOINTMENT (OUTPATIENT)
Dept: CARDIOLOGY | Facility: MEDICAL CENTER | Age: 77
DRG: 640 | End: 2022-09-27
Attending: INTERNAL MEDICINE
Payer: MEDICARE

## 2022-09-27 LAB
ANION GAP SERPL CALC-SCNC: 16 MMOL/L (ref 7–16)
BASOPHILS # BLD AUTO: 1 % (ref 0–1.8)
BASOPHILS # BLD: 0.11 K/UL (ref 0–0.12)
BUN SERPL-MCNC: 47 MG/DL (ref 8–22)
CALCIUM SERPL-MCNC: 8.5 MG/DL (ref 8.4–10.2)
CALCIUM SERPL-MCNC: 8.6 MG/DL (ref 8.4–10.2)
CHLORIDE SERPL-SCNC: 97 MMOL/L (ref 96–112)
CK SERPL-CCNC: 46 U/L (ref 0–154)
CO2 SERPL-SCNC: 25 MMOL/L (ref 20–33)
CREAT SERPL-MCNC: 7.28 MG/DL (ref 0.5–1.4)
EOSINOPHIL # BLD AUTO: 0.39 K/UL (ref 0–0.51)
EOSINOPHIL NFR BLD: 3.5 % (ref 0–6.9)
ERYTHROCYTE [DISTWIDTH] IN BLOOD BY AUTOMATED COUNT: 46.5 FL (ref 35.9–50)
FERRITIN SERPL-MCNC: 509 NG/ML (ref 10–291)
GFR SERPLBLD CREATININE-BSD FMLA CKD-EPI: 5 ML/MIN/1.73 M 2
GLUCOSE BLD STRIP.AUTO-MCNC: 107 MG/DL (ref 65–99)
GLUCOSE BLD STRIP.AUTO-MCNC: 143 MG/DL (ref 65–99)
GLUCOSE BLD STRIP.AUTO-MCNC: 156 MG/DL (ref 65–99)
GLUCOSE BLD STRIP.AUTO-MCNC: 176 MG/DL (ref 65–99)
GLUCOSE SERPL-MCNC: 133 MG/DL (ref 65–99)
HAV IGM SERPL QL IA: NORMAL
HBV CORE IGM SER QL: NORMAL
HBV SURFACE AB SERPL IA-ACNC: <3.5 MIU/ML (ref 0–10)
HBV SURFACE AG SER QL: NORMAL
HCT VFR BLD AUTO: 26 % (ref 37–47)
HCV AB SER QL: NORMAL
HGB BLD-MCNC: 8.2 G/DL (ref 12–16)
IMM GRANULOCYTES # BLD AUTO: 0.1 K/UL (ref 0–0.11)
IMM GRANULOCYTES NFR BLD AUTO: 0.9 % (ref 0–0.9)
IRON SATN MFR SERPL: 23 % (ref 15–55)
IRON SERPL-MCNC: 52 UG/DL (ref 40–170)
LV EJECT FRACT  99904: 65
LV EJECT FRACT MOD 2C 99903: 77.41
LV EJECT FRACT MOD 4C 99902: 59.2
LV EJECT FRACT MOD BP 99901: 69.09
LYMPHOCYTES # BLD AUTO: 2.82 K/UL (ref 1–4.8)
LYMPHOCYTES NFR BLD: 25.5 % (ref 22–41)
MAGNESIUM SERPL-MCNC: 1.8 MG/DL (ref 1.5–2.5)
MCH RBC QN AUTO: 31.8 PG (ref 27–33)
MCHC RBC AUTO-ENTMCNC: 31.5 G/DL (ref 33.6–35)
MCV RBC AUTO: 100.8 FL (ref 81.4–97.8)
MONOCYTES # BLD AUTO: 1.45 K/UL (ref 0–0.85)
MONOCYTES NFR BLD AUTO: 13.1 % (ref 0–13.4)
NEUTROPHILS # BLD AUTO: 6.17 K/UL (ref 2–7.15)
NEUTROPHILS NFR BLD: 56 % (ref 44–72)
NRBC # BLD AUTO: 0 K/UL
NRBC BLD-RTO: 0 /100 WBC
PLATELET # BLD AUTO: 199 K/UL (ref 164–446)
PMV BLD AUTO: 12.1 FL (ref 9–12.9)
POTASSIUM SERPL-SCNC: 3.6 MMOL/L (ref 3.6–5.5)
PTH-INTACT SERPL-MCNC: 179 PG/ML (ref 14–72)
RBC # BLD AUTO: 2.58 M/UL (ref 4.2–5.4)
SODIUM SERPL-SCNC: 138 MMOL/L (ref 135–145)
TIBC SERPL-MCNC: 231 UG/DL (ref 250–450)
UIBC SERPL-MCNC: 179 UG/DL (ref 110–370)
WBC # BLD AUTO: 11 K/UL (ref 4.8–10.8)

## 2022-09-27 PROCEDURE — 700111 HCHG RX REV CODE 636 W/ 250 OVERRIDE (IP): Performed by: INTERNAL MEDICINE

## 2022-09-27 PROCEDURE — 93306 TTE W/DOPPLER COMPLETE: CPT

## 2022-09-27 PROCEDURE — 83550 IRON BINDING TEST: CPT

## 2022-09-27 PROCEDURE — 770006 HCHG ROOM/CARE - MED/SURG/GYN SEMI*

## 2022-09-27 PROCEDURE — 83735 ASSAY OF MAGNESIUM: CPT

## 2022-09-27 PROCEDURE — 700111 HCHG RX REV CODE 636 W/ 250 OVERRIDE (IP): Performed by: STUDENT IN AN ORGANIZED HEALTH CARE EDUCATION/TRAINING PROGRAM

## 2022-09-27 PROCEDURE — 36415 COLL VENOUS BLD VENIPUNCTURE: CPT

## 2022-09-27 PROCEDURE — 5A1D70Z PERFORMANCE OF URINARY FILTRATION, INTERMITTENT, LESS THAN 6 HOURS PER DAY: ICD-10-PCS | Performed by: STUDENT IN AN ORGANIZED HEALTH CARE EDUCATION/TRAINING PROGRAM

## 2022-09-27 PROCEDURE — 82728 ASSAY OF FERRITIN: CPT

## 2022-09-27 PROCEDURE — 99233 SBSQ HOSP IP/OBS HIGH 50: CPT | Performed by: INTERNAL MEDICINE

## 2022-09-27 PROCEDURE — A9270 NON-COVERED ITEM OR SERVICE: HCPCS | Performed by: INTERNAL MEDICINE

## 2022-09-27 PROCEDURE — 83540 ASSAY OF IRON: CPT

## 2022-09-27 PROCEDURE — 82550 ASSAY OF CK (CPK): CPT

## 2022-09-27 PROCEDURE — 82652 VIT D 1 25-DIHYDROXY: CPT

## 2022-09-27 PROCEDURE — 93306 TTE W/DOPPLER COMPLETE: CPT | Mod: 26 | Performed by: INTERNAL MEDICINE

## 2022-09-27 PROCEDURE — 80048 BASIC METABOLIC PNL TOTAL CA: CPT

## 2022-09-27 PROCEDURE — 90935 HEMODIALYSIS ONE EVALUATION: CPT

## 2022-09-27 PROCEDURE — 83970 ASSAY OF PARATHORMONE: CPT

## 2022-09-27 PROCEDURE — 700102 HCHG RX REV CODE 250 W/ 637 OVERRIDE(OP): Performed by: INTERNAL MEDICINE

## 2022-09-27 PROCEDURE — 85025 COMPLETE CBC W/AUTO DIFF WBC: CPT

## 2022-09-27 PROCEDURE — 82962 GLUCOSE BLOOD TEST: CPT | Mod: 91

## 2022-09-27 PROCEDURE — 94760 N-INVAS EAR/PLS OXIMETRY 1: CPT

## 2022-09-27 RX ORDER — HEPARIN SODIUM 1000 [USP'U]/ML
2000 INJECTION, SOLUTION INTRAVENOUS; SUBCUTANEOUS PRN
Status: DISCONTINUED | OUTPATIENT
Start: 2022-09-27 | End: 2022-10-03 | Stop reason: HOSPADM

## 2022-09-27 RX ORDER — HEPARIN SODIUM 1000 [USP'U]/ML
3300 INJECTION, SOLUTION INTRAVENOUS; SUBCUTANEOUS PRN
Status: DISCONTINUED | OUTPATIENT
Start: 2022-09-27 | End: 2022-10-03 | Stop reason: HOSPADM

## 2022-09-27 RX ORDER — AMLODIPINE BESYLATE 5 MG/1
5 TABLET ORAL
Status: DISCONTINUED | OUTPATIENT
Start: 2022-09-28 | End: 2022-10-03 | Stop reason: HOSPADM

## 2022-09-27 RX ADMIN — HEPARIN SODIUM 5000 UNITS: 5000 INJECTION, SOLUTION INTRAVENOUS; SUBCUTANEOUS at 21:14

## 2022-09-27 RX ADMIN — AMITRIPTYLINE HYDROCHLORIDE 10 MG: 10 TABLET, FILM COATED ORAL at 17:46

## 2022-09-27 RX ADMIN — HEPARIN SODIUM 3300 UNITS: 1000 INJECTION, SOLUTION INTRAVENOUS; SUBCUTANEOUS at 14:42

## 2022-09-27 RX ADMIN — LEVOTHYROXINE SODIUM 100 MCG: 0.1 TABLET ORAL at 05:49

## 2022-09-27 RX ADMIN — GABAPENTIN 600 MG: 300 CAPSULE ORAL at 17:45

## 2022-09-27 RX ADMIN — HEPARIN SODIUM 5000 UNITS: 5000 INJECTION, SOLUTION INTRAVENOUS; SUBCUTANEOUS at 05:45

## 2022-09-27 RX ADMIN — GABAPENTIN 300 MG: 300 CAPSULE ORAL at 05:49

## 2022-09-27 RX ADMIN — HEPARIN SODIUM 2000 UNITS: 1000 INJECTION, SOLUTION INTRAVENOUS; SUBCUTANEOUS at 11:40

## 2022-09-27 RX ADMIN — FUROSEMIDE 80 MG: 40 TABLET ORAL at 05:47

## 2022-09-27 RX ADMIN — AMITRIPTYLINE HYDROCHLORIDE 10 MG: 10 TABLET, FILM COATED ORAL at 05:49

## 2022-09-27 RX ADMIN — AMLODIPINE BESYLATE 10 MG: 5 TABLET ORAL at 05:49

## 2022-09-27 RX ADMIN — TAMOXIFEN CITRATE 20 MG: 10 TABLET ORAL at 05:47

## 2022-09-27 RX ADMIN — HEPARIN SODIUM 5000 UNITS: 5000 INJECTION, SOLUTION INTRAVENOUS; SUBCUTANEOUS at 15:36

## 2022-09-27 ASSESSMENT — ENCOUNTER SYMPTOMS
PALPITATIONS: 0
BLOOD IN STOOL: 0
DIARRHEA: 1
NAUSEA: 0
WEAKNESS: 1
BACK PAIN: 0
DIZZINESS: 0
ABDOMINAL PAIN: 0
HEADACHES: 0
CHILLS: 0
COUGH: 0
SHORTNESS OF BREATH: 0
FEVER: 0

## 2022-09-27 ASSESSMENT — PAIN DESCRIPTION - PAIN TYPE
TYPE: ACUTE PAIN

## 2022-09-27 NOTE — CARE PLAN
The patient is Stable - Low risk of patient condition declining or worsening    Shift Goals  Clinical Goals: sleep at least 10 hours, monitor vs  Patient Goals: sleep at least 10 hours    Progress made toward(s) clinical / shift goals:  Pt was seen sleeping in between rounds. Hourly rounding in progress.     Patient is not progressing towards the following goals: N/A

## 2022-09-27 NOTE — H&P
"Hospital Medicine History & Physical Note    Date of Service  9/26/2022    Primary Care Physician  J Timothy Lombard, M.D.    Consultants  nephrology    Specialist Names: Dr. Verdin    Code Status  DNR    Chief Complaint  Weakness      History of Presenting Illness  Germaine Chaparro is a 77 y.o. female who presented 9/26/2022 with weakness. She has h/o HFpEF, DM, HTN, pulm HTN, DOROTHY who was recently started on dialysis 9/11 at [facility] for renal failure. She was discharged home and patient felt she was discharged too early. She says she's been weak. She perks up after dialysis but on Saturday she felt very weak and slid off a chair where her son found her. She doesn't remember falling, may have passed out. She has had issues with hypoglycemia after hospitalization.  She went to [facility] and found with mild hypoMg 1.6 and hypophos 1.9 that was repleted orally. It was discussed with Dr. Verdin who said she might need long-term placement. She was transferred because they do not have dialysis. Family had requested [facility] on transfer.  Currently she feels weak and \"spacey.\" Denies CP, SOB, dizziness, abd pain, nausea. She says her edema is improved from her baseline.    I discussed the plan of care with patient.    Review of Systems  Review of Systems   Constitutional:  Positive for malaise/fatigue.   Respiratory:  Negative for shortness of breath.    Cardiovascular:  Positive for leg swelling (chronic). Negative for chest pain.   Gastrointestinal:  Negative for abdominal pain and nausea.   Genitourinary:  Negative for dysuria, frequency and urgency.   Neurological:  Positive for weakness. Negative for dizziness.   All other systems reviewed and are negative.    Past Medical History   has a past medical history of Anesthesia (2016), Arthritis, Asthma, Breath shortness, Bronchitis (1998), Congestive heart failure (HCC), Diabetes (HCC), Encounter for renal dialysis (07/2014), Heart murmur, Heart valve disease, High " "cholesterol, Hypertension, Hypothyroid, Low back pain, Numbness and tingling in hands, Pain (3/4/16), Renal disorder, Snoring, Unspecified cataract, Unspecified urinary incontinence, and Urinary bladder disorder.    Surgical History   has a past surgical history that includes other abdominal surgery; tonsillectomy; cervical fusion posterior (9/16/2015); cervical laminectomy posterior (9/16/2015); gyn surgery; lumbar laminectomy diskectomy (1/22/2016); irrigation & debridement neuro (3/10/2016); cervical fusion posterior (10/13/2016); cervical decompression posterior (10/13/2016); lumbar laminectomy diskectomy (10/13/2016); cataract extraction with iol (Bilateral); fusion, spine, lumbar, plif (12/28/2018); and lumbar decompression (12/28/2018).     Family History  family history includes Heart Disease in her maternal grandmother, maternal uncle, and mother.     Social History   reports that she has never smoked. She has never used smokeless tobacco. She reports that she does not drink alcohol and does not use drugs.    Allergies  Allergies   Allergen Reactions    Codeine Itching     \"My head itches.\"    Other Environmental Rash     Requires Hypoallergenic Linen    Tape Rash     Paper Tape ok       Medications  Prior to Admission Medications   Prescriptions Last Dose Informant Patient Reported? Taking?   SITagliptin (JANUVIA) 100 MG Tab  Patient Yes No   Sig: Take 100 mg by mouth every morning.   acetaminophen (TYLENOL) 325 MG Tab   Yes No   Sig: Take 3 Tabs by mouth every 8 hours. Transition to as needed when off of oxycodone   amLODIPine (NORVASC) 5 MG Tab   No No   Sig: Take 1 Tab by mouth every bedtime.   amitriptyline (ELAVIL) 10 MG Tab   No No   Sig: Take 2 Tabs by mouth every bedtime.   atorvastatin (LIPITOR) 20 MG Tab  Patient No No   Sig: Take 1 Tab by mouth every bedtime.   budesonide-formoterol (SYMBICORT) 80-4.5 MCG/ACT Aerosol  Patient No No   Sig: Inhale 2 Puffs by mouth 2 Times a Day.   gabapentin " (NEURONTIN) 300 MG Cap   No No   Sig: Take 1-2 Caps by mouth every bedtime. 300 mg morning and mid day, 600 mg bedtime   insulin glargine (LANTUS) 100 UNIT/ML Solution  Patient Yes No   Sig: Inject 30 Units as instructed every evening. 15 units night prior to surgery   levothyroxine (SYNTHROID) 175 MCG Tab  Patient Yes No   Sig: Take 175 mcg by mouth Every morning on an empty stomach.   metFORMIN (GLUCOPHAGE) 500 MG Tab  Patient Yes No   Sig: Take 500 mg by mouth 2 times a day, with meals.   metoprolol SR (TOPROL XL) 100 MG TABLET SR 24 HR  Patient Yes No   Sig: Take 200 mg by mouth 2 Times a Day.   vitamin D 4000 units Tab   Yes No   Sig: Take 4,000 Units by mouth every day.      Facility-Administered Medications: None       Physical Exam  Temp:  [36.6 °C (97.8 °F)] 36.6 °C (97.8 °F)  Pulse:  [63] 63  Resp:  [18] 18  BP: (127)/(48) 127/48  SpO2:  [97 %] 97 %                          Physical Exam  Vitals and nursing note reviewed.   Constitutional:       General: She is not in acute distress.     Appearance: She is obese. She is not ill-appearing, toxic-appearing or diaphoretic.   HENT:      Head: Normocephalic.      Mouth/Throat:      Mouth: Mucous membranes are moist.   Eyes:      General:         Right eye: No discharge.         Left eye: No discharge.   Cardiovascular:      Rate and Rhythm: Normal rate and regular rhythm.   Pulmonary:      Effort: Pulmonary effort is normal. No respiratory distress.      Breath sounds: No wheezing or rales.   Abdominal:      Palpations: Abdomen is soft.      Tenderness: There is no abdominal tenderness. There is no guarding or rebound.   Musculoskeletal:      Cervical back: Neck supple.      Right lower leg: Edema (1+) present.      Left lower leg: Edema (1+) present.   Skin:     General: Skin is warm and dry.   Neurological:      General: No focal deficit present.      Mental Status: She is alert and oriented to person, place, and time.      Cranial Nerves: No cranial nerve  deficit.      Comments: 4/5 strength UE and LE       Laboratory:          No results for input(s): ALTSGPT, ASTSGOT, ALKPHOSPHAT, TBILIRUBIN, DBILIRUBIN, GAMMAGT, AMYLASE, LIPASE, ALB, PREALBUMIN, GLUCOSE in the last 72 hours.      No results for input(s): NTPROBNP in the last 72 hours.      No results for input(s): TROPONINT in the last 72 hours.    Imaging:  OUTSIDE IMAGES-CT HEAD   Final Result      EC-ECHOCARDIOGRAM COMPLETE W/O CONT    (Results Pending)         Assessment/Plan:  Justification for Admission Status  I anticipate this patient will require at least two midnights for appropriate medical management, necessitating inpatient admission because unable to ambulate with no safe discharge and needing dialysis    Patient will need a Med/Surg bed on MEDICAL service .  The need is secondary to unable to ambulate, dialysis patient.    * Debility- (present on admission)  Assessment & Plan  Weakness after hospital stay and starting dialysis  PTOT to eval    DOROTHY (obstructive sleep apnea)  Assessment & Plan  CPAP ordered    Pulmonary HTN (HCC)  Assessment & Plan  Cont oral lasix    Breast cancer (HCC)  Assessment & Plan  Had resection 2021 and on tamoxifen, continue medication    End stage renal failure on dialysis (HCC)  Assessment & Plan  HD MWF  I consulted Dr. Verdin    Hypothyroid- (present on admission)  Assessment & Plan  Cont synthroid    Type 2 diabetes mellitus with hyperglycemia (HCC)- (present on admission)  Assessment & Plan  ISS ordered    Congestive heart failure (HCC)- (present on admission)  Assessment & Plan  Diastolic dysfunction  No recent TTE. Given possible syncope on Saturday, will check EKG and TTE  Cont coreg, norvasc and lasix    HTN (hypertension)- (present on admission)  Assessment & Plan  Cont coreg, norvasc and lasix      VTE prophylaxis: heparin ppx

## 2022-09-27 NOTE — ASSESSMENT & PLAN NOTE
Weakness after hospital stay and starting dialysis  Has macrocytic anemia  Pending [facility] snf  Feels better after IM B12  More ambulatory with walker

## 2022-09-27 NOTE — PROGRESS NOTES
1955H Received a call from the lab, critical lab value Creatinine 7.38, read back done    1959H Informed MD Dr Rivera no new order made

## 2022-09-27 NOTE — ASSESSMENT & PLAN NOTE
Macrocytic anemia, due to ESRD and B12 deficiency.  Pt denied GIB symptoms.  Ferritin 509.  Iron sat 23%.  B12 levels 227, low.  Gave IM. Continue PO.  Monitor labs on HD days while hospitalized

## 2022-09-27 NOTE — PROGRESS NOTES
"Hospital Medicine Daily Progress Note    Date of Service  9/27/2022    Chief Complaint  Germaine Chaparro is a 77 y.o. female admitted 9/26/2022 with ongoing weakness    Hospital Course  As per admitting Hospitalist Dr. Rivera:   \"Germaine Chaparro is a 77 y.o. female who presented 9/26/2022 with weakness. She has h/o HFpEF, DM, HTN, pulm HTN, DOROTHY who was recently started on dialysis 9/11 at [facility] for renal failure. She was discharged home and patient felt she was discharged too early. She says she's been weak. She perks up after dialysis but on Saturday she felt very weak and slid off a chair where her son found her. She doesn't remember falling, may have passed out. She has had issues with hypoglycemia after hospitalization.  She went to [facility] and found with mild hypoMg 1.6 and hypophos 1.9 that was repleted orally. It was discussed with Dr. Verdin who said she might need long-term placement. She was transferred because they do not have dialysis. Family had requested [facility] on transfer.  Currently she feels weak and \"spacey.\" Denies CP, SOB, dizziness, abd pain, nausea. She says her edema is improved from her baseline.\"    Interval Problem Update  Patient stated she feels weak. She mentioned she has had on and off chronic diarrhea, about 2 loose watery stools per day.  Denied black stools, vomiting blood.  Spoke with son, he mentioned patient had blood glucose level down to 30 after giving Glargine prescribed from [place].  Patient not safe at home, very weak, lives alone. She needed help from her Religion friends to get into a car to get to dialysis in [place]. Son does not want to return to [place].   PT/OT pending  Receiving HD today, goal 1L.  Hgb 8.2, Hct 26, .8. Ferritin 509.  Iron sat 23%.  WBC 11, right neck has insertion site from prior central line from [place], mild erythema.    I have discussed this patient's plan of care and discharge plan at IDT rounds today with Case " Management, Nursing, Nursing leadership, and other members of the IDT team.    Consultants/Specialty  nephrology    Code Status  DNAR/DNI    Disposition  Patient is not medically cleared for discharge.   Anticipate discharge to  TBD .  I have placed the appropriate orders for post-discharge needs.    Review of Systems  Review of Systems   Constitutional:  Positive for malaise/fatigue. Negative for chills and fever.   Respiratory:  Negative for cough and shortness of breath.    Cardiovascular:  Negative for chest pain and palpitations.   Gastrointestinal:  Positive for diarrhea. Negative for abdominal pain, blood in stool, melena and nausea.   Musculoskeletal:  Negative for back pain and joint pain.   Neurological:  Positive for weakness. Negative for dizziness and headaches.   All other systems reviewed and are negative.     Physical Exam  Temp:  [36.3 °C (97.4 °F)-36.9 °C (98.4 °F)] 36.3 °C (97.4 °F)  Pulse:  [59-67] 67  Resp:  [17-18] 17  BP: (103-146)/(43-49) 120/49  SpO2:  [94 %-97 %] 97 %    Physical Exam  Vitals and nursing note reviewed.   Constitutional:       General: She is not in acute distress.     Appearance: She is obese. She is ill-appearing.      Comments: Frail appearing elderly female   HENT:      Head: Normocephalic and atraumatic.      Comments: Temporal muscle wasting positive     Mouth/Throat:      Mouth: Mucous membranes are dry.      Pharynx: No oropharyngeal exudate.   Eyes:      General: No scleral icterus.     Extraocular Movements: Extraocular movements intact.   Cardiovascular:      Rate and Rhythm: Normal rate and regular rhythm.      Pulses: Normal pulses.      Heart sounds: Normal heart sounds. No murmur heard.  Pulmonary:      Effort: Pulmonary effort is normal. No respiratory distress.      Breath sounds: Normal breath sounds. No wheezing.   Abdominal:      General: Abdomen is flat. Bowel sounds are normal. There is no distension.      Palpations: Abdomen is soft.      Tenderness:  There is no abdominal tenderness.   Musculoskeletal:         General: No swelling or tenderness.      Cervical back: Normal range of motion. No rigidity.      Comments: Sarcopenic. Bilateral hand muscle atrophy noted.   Skin:     Capillary Refill: Capillary refill takes 2 to 3 seconds.      Coloration: Skin is pale. Skin is not jaundiced.      Findings: No erythema.   Neurological:      Mental Status: She is alert and oriented to person, place, and time. Mental status is at baseline.      Motor: Weakness present.   Psychiatric:         Mood and Affect: Mood normal.         Behavior: Behavior normal.         Thought Content: Thought content normal.         Judgment: Judgment normal.       Fluids    Intake/Output Summary (Last 24 hours) at 9/27/2022 0727  Last data filed at 9/27/2022 0617  Gross per 24 hour   Intake 720 ml   Output 200 ml   Net 520 ml       Laboratory  Recent Labs     09/26/22  1901 09/27/22  0256   WBC 11.4* 11.0*   RBC 2.72* 2.58*   HEMOGLOBIN 8.7* 8.2*   HEMATOCRIT 26.9* 26.0*   MCV 98.9* 100.8*   MCH 32.0 31.8   MCHC 32.3* 31.5*   RDW 46.4 46.5   PLATELETCT 213 199   MPV 11.9 12.1     Recent Labs     09/26/22  1901 09/27/22  0256   SODIUM 132* 138   POTASSIUM 3.9 3.6   CHLORIDE 93* 97   CO2 24 25   GLUCOSE 169* 133*   BUN 45* 47*   CREATININE 7.38* 7.28*   CALCIUM 8.8 8.5                   Imaging  OUTSIDE IMAGES-CT HEAD   Final Result      EC-ECHOCARDIOGRAM COMPLETE W/O CONT    (Results Pending)        Assessment/Plan  * Debility- (present on admission)  Assessment & Plan  Weakness after hospital stay and starting dialysis  Has macrocytic anemia  PTOT to eval    DOROTHY (obstructive sleep apnea)  Assessment & Plan  CPAP nocturnal    Pulmonary HTN (HCC)  Assessment & Plan  2015 RVSP 65mmHg  Cont oral lasix    Breast cancer (HCC)  Assessment & Plan  Had resection 2021 and on tamoxifen, continue medication    End stage renal failure on dialysis (HCC)  Assessment & Plan  HD MWF  consulted Dr. Verdin on  admission  Will need outpatient HD    Hypothyroid- (present on admission)  Assessment & Plan  Cont synthroid    Type 2 diabetes mellitus with hyperglycemia (HCC)- (present on admission)  Assessment & Plan  ISS ordered    Congestive heart failure (HCC)- (present on admission)  Assessment & Plan  Chronic (HFpEF) diastolic dysfunction  No recent TTE. Given possible syncope on Saturday, will check EKG and TTE  Cont coreg, norvasc and lasix    HTN (hypertension)- (present on admission)  Assessment & Plan  Cont coreg, norvasc and lasix       VTE prophylaxis: heparin ppx    I have performed a physical exam and reviewed and updated ROS and Plan today (9/27/2022). In review of yesterday's note (9/26/2022), there are no changes except as documented above.

## 2022-09-27 NOTE — DISCHARGE PLANNING
Case Management Discharge Planning    Admission Date: 9/26/2022  GMLOS:    ALOS: 1    6-Clicks ADL Score: 18  6-Clicks Mobility Score: 16      Anticipated Discharge Dispo:  Home    DME Needed: No    Action(s) Taken: OTHER, CM RN reached out to Zohreh at [phone] informed per chart review pt was set up with chair at [facility] and may want to transfer chairs. Zohreh stating will reach out to pt and confirm.    1318: MISTI RN received notification pts son at bedside and stating pt not safe to discharge home. MISTI RN met with son at bedside and discussed long term placement. Son stating pt has no income and will need assistance. MISTI RN reached out to [facility] to screen pt for Medicaid. MISTI RN provided pts son with resources such as a list of GH, Assisted Living list and resources for contact to [facility] to help with long term placement. Son informed pt needing dialysis will be a barrier for long term placement.     Escalations Completed: None    Medically Clear: No    Next Steps: MISTI RN to follow up with Zohreh about OP dialysis chair     Barriers to Discharge: Medical clearance    Is the patient up for discharge tomorrow: No

## 2022-09-27 NOTE — PROGRESS NOTES
Pt c/o not feeling well, vs taken and recorded. Pt kept monitored. Per pt this not the first time she felt like this. Blood sugar checked.

## 2022-09-27 NOTE — ASSESSMENT & PLAN NOTE
Chronic (HFpEF) diastolic dysfunction  No recent TTE. Given possible syncope on Saturday    New echo LVEF 65%, RVSP 40mmHg, grade 2 diastolic dysfunction  Continue coreg, norvasc and lasix  stable

## 2022-09-27 NOTE — PROGRESS NOTES
4 Eyes Skin Assessment Completed by DENNIS Carson and DENNIS Leahy.    Head WDL  Ears WDL  Nose WDL  Mouth WDL  Neck WDL  Breast/Chest WDL  Shoulder Blades WDL  Spine WDL  (R) Arm/Elbow/Hand WDL  (L) Arm/Elbow/Hand WDL  Abdomen WDL  Groin WDL  Scrotum/Coccyx/Buttocks WDL  (R) Leg WDL  (L) Leg WDL  (R) Heel/Foot/Toe WDL  (L) Heel/Foot/Toe WDL          Devices In Places Pulse Ox      Interventions In Place Pillows    Possible Skin Injury No    Pictures Uploaded Into Epic N/A  Wound Consult Placed N/A  RN Wound Prevention Protocol Ordered No

## 2022-09-27 NOTE — CONSULTS
"[facility] -  CONSULTATION NOTE               Author: Cory Verdin M.D. Date & Time: 9/27/2022  8:47 AM       REASON FOR CONSULTATION:   Inpatient hemodialysis management.    CHIEF COMPLAINT:   \"Weakness\"    HISTORY OF PRESENT ILLNESS:    Germaine Chaparro is a 77 y.o. female who presented 9/26/2022 with weakness. She has h/o HFpEF, DM, HTN, pulm HTN, DOROTHY who was recently started on dialysis 9/11 at [facility].  Since discharge she's been weak.  She slid off a chair where her son found her.  Has had issues with hypoglycemia after hospitalization.  She was at [facility] and found with mild hypoMg 1.6 and hypophos 1.9 that was repleted.  Now here for possible placement.      REVIEW OF SYSTEMS:    10 point ROS was performed and is as per HPI or otherwise negative.    PAST MEDICAL HISTORY:   Past Medical History:   Diagnosis Date    Anesthesia 2016    \"took a long time to wake up, ended up on a vent after neck surg\"      Arthritis     fingers, back    Asthma     uses inhaler    Breath shortness     with exertion    Bronchitis 1998    Congestive heart failure (HCC)     Diabetes (HCC)     oral and insulin    Encounter for renal dialysis 07/2014    r/t ARF, denies at this time    Heart murmur     Heart valve disease     High cholesterol     Hypertension     Hypothyroid     Low back pain     back and leg 8/10 at worst     Numbness and tingling in hands     and arms    Pain 3/4/16    3/10 lower back    Renal disorder     AFR in 7-2014, resolved    Snoring     Unspecified cataract     removed bilat    Unspecified urinary incontinence     wears pad    Urinary bladder disorder        PAST SURGICAL HISTORY:   Past Surgical History:   Procedure Laterality Date    LUMBAR FUSION POSTERIOR  12/28/2018    Procedure: LUMBAR FUSION POSTERIOR- L3-5 REDO;  Surgeon: Avel Sheldon M.D.;  Location: SURGERY [facility];  Service: Neurosurgery    LUMBAR DECOMPRESSION  12/28/2018    Procedure: LUMBAR " DECOMPRESSION;  Surgeon: Avel Sheldon M.D.;  Location: [facility];  Service: Neurosurgery    CERVICAL FUSION POSTERIOR  10/13/2016    Procedure: CERVICAL FUSION POSTERIOR - EXTENSION TO C3-4 W/PLACEMENT OF LATERAL MASS SCREWS AT C3;  Surgeon: Alvaro Chaudhry M.D.;  Location: SURGERY [facility];  Service:     CERVICAL DECOMPRESSION POSTERIOR  10/13/2016    Procedure: CERVICAL DECOMPRESSION POSTERIOR - RE=DO C3-4;  Surgeon: Alvaro Chaudhry M.D.;  Location: SURGERY [facility];  Service:     LUMBAR LAMINECTOMY DISKECTOMY  10/13/2016    Procedure: LUMBAR LAMINECTOMY DISKECTOMY - L4-5;  Surgeon: Alvaro Chaudhry M.D.;  Location: [facility];  Service:     IRRIGATION & DEBRIDEMENT NEURO  3/10/2016    Procedure: IRRIGATION & DEBRIDEMENT NEURO AND WOUND VAC PLACEMENT;  Surgeon: Alvaro Chaudhry M.D.;  Location: [facility];  Service:     LUMBAR LAMINECTOMY DISKECTOMY  1/22/2016    Procedure: LUMBAR LAMINECTOMY DISKECTOMY L1-5;  Surgeon: Alvaro Chaudhry M.D.;  Location: [facility];  Service:     CERVICAL FUSION POSTERIOR  9/16/2015    Procedure: CERVICAL FUSION POSTERIOR C3-T1 LATERAL MASS SCREWS;  Surgeon: Alvaro Chaudhry M.D.;  Location: SURGERY [facility];  Service:     CERVICAL LAMINECTOMY POSTERIOR  9/16/2015    Procedure: CERVICAL LAMINECTOMY POSTERIOR C3-T1;  Surgeon: Alvaro Chaudhry M.D.;  Location: [facility];  Service:     CATARACT EXTRACTION WITH IOL Bilateral     GYN SURGERY      hysterectomy     OTHER ABDOMINAL SURGERY      appendix removed    TONSILLECTOMY         FAMILY HISTORY:   Family History   Problem Relation Age of Onset    Heart Disease Mother     Heart Disease Maternal Uncle     Heart Disease Maternal Grandmother        SOCIAL HISTORY:   Social History     Tobacco Use   Smoking Status Never   Smokeless Tobacco Never     Social History     Substance and Sexual Activity   Alcohol Use No    Alcohol/week: 0.0 oz     Social History      Substance and Sexual Activity   Drug Use No       HOME MEDICATIONS:   Reviewed and documented in chart.    LABORATORY STUDIES:   Recent Labs     09/26/22  1901 09/27/22  0256   SODIUM 132* 138   POTASSIUM 3.9 3.6   CHLORIDE 93* 97   CO2 24 25   GLUCOSE 169* 133*   BUN 45* 47*   CREATININE 7.38* 7.28*   CALCIUM 8.8 8.5       ALLERGIES:  Codeine and Tape    VS:  /49   Pulse 67   Temp 36.3 °C (97.4 °F) (Oral)   Resp 17   Wt 115 kg (254 lb 10.1 oz)   SpO2 97%   BMI 41.10 kg/m²     Physical Exam  HENT:      Right Ear: External ear normal.      Left Ear: External ear normal.      Mouth/Throat:      Mouth: Mucous membranes are moist.   Eyes:      General: No scleral icterus.  Cardiovascular:      Rate and Rhythm: Normal rate.   Pulmonary:      Effort: Pulmonary effort is normal.   Abdominal:      Palpations: Abdomen is soft.   Musculoskeletal:      Cervical back: Normal range of motion.      Right lower leg: Edema present.      Left lower leg: Edema present.   Skin:     General: Skin is warm.   Neurological:      General: No focal deficit present.      Mental Status: She is alert.   Psychiatric:         Mood and Affect: Mood normal.          FLUID BALANCE:  In: 720 [P.O.:720]  Out: 200     IMAGING:  All imaging reviewed from admission to present day    IMPRESSION:  # JEANINE vs ESRD    - Now dialysis a dependent    - Will continue dialysis qMWF  # Weakness    - Management per primary team  # DOROTHY  # Pulmonary HTN  # Breast Cancer  # HTN  - Goal BP < 140/90  - At goal  # Anemia of CKD  - Goal Hgb 10-11  - Iron panel  # CKD-MBD  - PTH pen  - Vit D pen  - Ca 8.5  - PO4 2.5  # Dm  # Hypothyroidism    PLAN:  - HD today then MWF iHD during stay  - UF as tolerated  - No dietary protein or phos restrictions  - Dose all meds per ESRD  - Rest of management per primary team    Thank you for the consultation!

## 2022-09-27 NOTE — ASSESSMENT & PLAN NOTE
HD MWF  consulted Dr. Verdin on admission  Will need outpatient HD. [facility] needs T/TH/S schedule at [facility].    received HD today 2L removed, patient felt good no weakness.

## 2022-09-27 NOTE — PROGRESS NOTES
HD treatment ordered by Dr Verdin started at 1140 and ended at 1440 with net UF of 1 Liter as bp tolerated. Cleaned up prior to hd treatment. Tolerated hd treatment well with good BFR x 2 entire treatment. Dressing changed, no s/s of infection noted. Gave report to autumn Carson RN. See flow sheet for details.

## 2022-09-27 NOTE — PROGRESS NOTES
Received report from day shift nurse. Assumed pt care at 1915. Pt is A&Ox4, resting comfortably in bed. Pt on 1 lmp via nasal cannula No signs of SOB/respiratory distress. Labs noted, VSS. Pt c/o no pain at this moment. Fall precautions in place. Bed at lowest position. Call light and personal belongings within reach. Continue to monitor

## 2022-09-27 NOTE — HOSPITAL COURSE
"As per admitting Hospitalist Dr. Rivera:   \"Germaine Chaparro is a 77 y.o. female who presented 9/26/2022 with weakness. She has h/o HFpEF, DM, HTN, pulm HTN, DOROTHY who was recently started on dialysis 9/11 at [facility] for renal failure. She was discharged home and patient felt she was discharged too early. She says she's been weak. She perks up after dialysis but on Saturday she felt very weak and slid off a chair where her son found her. She doesn't remember falling, may have passed out. She has had issues with hypoglycemia after hospitalization.  She went to [facility] and found with mild hypoMg 1.6 and hypophos 1.9 that was repleted orally. It was discussed with Dr. Verdin who said she might need long-term placement. She was transferred because they do not have dialysis. Family had requested [facility] on transfer.  Currently she feels weak and \"spacey.\" Denies CP, SOB, dizziness, abd pain, nausea. She says her edema is improved from her baseline.\"  "

## 2022-09-28 PROBLEM — K59.1 FUNCTIONAL DIARRHEA: Status: ACTIVE | Noted: 2022-09-28

## 2022-09-28 LAB
1,25(OH)2D3 SERPL-MCNC: 29.6 PG/ML (ref 19.9–79.3)
ALBUMIN SERPL BCP-MCNC: 3.6 G/DL (ref 3.2–4.9)
BUN SERPL-MCNC: 27 MG/DL (ref 8–22)
CALCIUM SERPL-MCNC: 8.5 MG/DL (ref 8.4–10.2)
CHLORIDE SERPL-SCNC: 101 MMOL/L (ref 96–112)
CO2 SERPL-SCNC: 28 MMOL/L (ref 20–33)
CREAT SERPL-MCNC: 4.92 MG/DL (ref 0.5–1.4)
ERYTHROCYTE [DISTWIDTH] IN BLOOD BY AUTOMATED COUNT: 46.8 FL (ref 35.9–50)
GFR SERPLBLD CREATININE-BSD FMLA CKD-EPI: 9 ML/MIN/1.73 M 2
GLUCOSE BLD STRIP.AUTO-MCNC: 143 MG/DL (ref 65–99)
GLUCOSE BLD STRIP.AUTO-MCNC: 151 MG/DL (ref 65–99)
GLUCOSE BLD STRIP.AUTO-MCNC: 169 MG/DL (ref 65–99)
GLUCOSE BLD STRIP.AUTO-MCNC: 95 MG/DL (ref 65–99)
GLUCOSE SERPL-MCNC: 140 MG/DL (ref 65–99)
HCT VFR BLD AUTO: 27.2 % (ref 37–47)
HGB BLD-MCNC: 8.5 G/DL (ref 12–16)
MAGNESIUM SERPL-MCNC: 1.7 MG/DL (ref 1.5–2.5)
MCH RBC QN AUTO: 31.4 PG (ref 27–33)
MCHC RBC AUTO-ENTMCNC: 31.3 G/DL (ref 33.6–35)
MCV RBC AUTO: 100.4 FL (ref 81.4–97.8)
PHOSPHATE SERPL-MCNC: 3 MG/DL (ref 2.5–4.5)
PLATELET # BLD AUTO: 208 K/UL (ref 164–446)
PMV BLD AUTO: 11.9 FL (ref 9–12.9)
POTASSIUM SERPL-SCNC: 4.3 MMOL/L (ref 3.6–5.5)
RBC # BLD AUTO: 2.71 M/UL (ref 4.2–5.4)
SODIUM SERPL-SCNC: 140 MMOL/L (ref 135–145)
VIT B12 SERPL-MCNC: 227 PG/ML (ref 211–911)
WBC # BLD AUTO: 8.8 K/UL (ref 4.8–10.8)

## 2022-09-28 PROCEDURE — 90935 HEMODIALYSIS ONE EVALUATION: CPT

## 2022-09-28 PROCEDURE — 700102 HCHG RX REV CODE 250 W/ 637 OVERRIDE(OP): Performed by: INTERNAL MEDICINE

## 2022-09-28 PROCEDURE — 83735 ASSAY OF MAGNESIUM: CPT

## 2022-09-28 PROCEDURE — 94760 N-INVAS EAR/PLS OXIMETRY 1: CPT

## 2022-09-28 PROCEDURE — A9270 NON-COVERED ITEM OR SERVICE: HCPCS | Performed by: INTERNAL MEDICINE

## 2022-09-28 PROCEDURE — 82962 GLUCOSE BLOOD TEST: CPT

## 2022-09-28 PROCEDURE — 700111 HCHG RX REV CODE 636 W/ 250 OVERRIDE (IP): Performed by: INTERNAL MEDICINE

## 2022-09-28 PROCEDURE — 85027 COMPLETE CBC AUTOMATED: CPT

## 2022-09-28 PROCEDURE — 94660 CPAP INITIATION&MGMT: CPT

## 2022-09-28 PROCEDURE — 82607 VITAMIN B-12: CPT

## 2022-09-28 PROCEDURE — 770006 HCHG ROOM/CARE - MED/SURG/GYN SEMI*

## 2022-09-28 PROCEDURE — 36415 COLL VENOUS BLD VENIPUNCTURE: CPT

## 2022-09-28 PROCEDURE — 99233 SBSQ HOSP IP/OBS HIGH 50: CPT | Performed by: INTERNAL MEDICINE

## 2022-09-28 PROCEDURE — 80069 RENAL FUNCTION PANEL: CPT

## 2022-09-28 PROCEDURE — 97162 PT EVAL MOD COMPLEX 30 MIN: CPT

## 2022-09-28 RX ORDER — GAUZE BANDAGE 2" X 2"
100 BANDAGE TOPICAL DAILY
Status: DISCONTINUED | OUTPATIENT
Start: 2022-09-28 | End: 2022-10-03 | Stop reason: HOSPADM

## 2022-09-28 RX ORDER — DEXTROSE MONOHYDRATE 25 G/50ML
25 INJECTION, SOLUTION INTRAVENOUS
Status: DISCONTINUED | OUTPATIENT
Start: 2022-09-28 | End: 2022-10-03 | Stop reason: HOSPADM

## 2022-09-28 RX ORDER — CYANOCOBALAMIN 1000 UG/ML
1000 INJECTION, SOLUTION INTRAMUSCULAR; SUBCUTANEOUS
Status: DISCONTINUED | OUTPATIENT
Start: 2022-09-28 | End: 2022-09-29

## 2022-09-28 RX ORDER — CHOLECALCIFEROL (VITAMIN D3) 125 MCG
1000 CAPSULE ORAL DAILY
Status: DISCONTINUED | OUTPATIENT
Start: 2022-09-29 | End: 2022-10-03 | Stop reason: HOSPADM

## 2022-09-28 RX ADMIN — THIAMINE HCL TAB 100 MG 100 MG: 100 TAB at 17:32

## 2022-09-28 RX ADMIN — SENNOSIDES AND DOCUSATE SODIUM 2 TABLET: 50; 8.6 TABLET ORAL at 06:32

## 2022-09-28 RX ADMIN — HEPARIN SODIUM 5000 UNITS: 5000 INJECTION, SOLUTION INTRAVENOUS; SUBCUTANEOUS at 21:19

## 2022-09-28 RX ADMIN — AMLODIPINE BESYLATE 5 MG: 5 TABLET ORAL at 06:32

## 2022-09-28 RX ADMIN — CYANOCOBALAMIN 1000 MCG: 1000 INJECTION, SOLUTION INTRAMUSCULAR at 17:32

## 2022-09-28 RX ADMIN — LEVOTHYROXINE SODIUM 100 MCG: 0.1 TABLET ORAL at 06:32

## 2022-09-28 RX ADMIN — HEPARIN SODIUM 2000 UNITS: 1000 INJECTION, SOLUTION INTRAVENOUS; SUBCUTANEOUS at 16:46

## 2022-09-28 RX ADMIN — CARVEDILOL 12.5 MG: 6.25 TABLET, FILM COATED ORAL at 17:32

## 2022-09-28 RX ADMIN — AMITRIPTYLINE HYDROCHLORIDE 10 MG: 10 TABLET, FILM COATED ORAL at 06:32

## 2022-09-28 RX ADMIN — HEPARIN SODIUM 5000 UNITS: 5000 INJECTION, SOLUTION INTRAVENOUS; SUBCUTANEOUS at 06:33

## 2022-09-28 RX ADMIN — GABAPENTIN 300 MG: 300 CAPSULE ORAL at 06:34

## 2022-09-28 RX ADMIN — FUROSEMIDE 80 MG: 40 TABLET ORAL at 06:32

## 2022-09-28 RX ADMIN — TAMOXIFEN CITRATE 20 MG: 10 TABLET ORAL at 06:32

## 2022-09-28 RX ADMIN — AMITRIPTYLINE HYDROCHLORIDE 10 MG: 10 TABLET, FILM COATED ORAL at 17:32

## 2022-09-28 RX ADMIN — HEPARIN SODIUM 3300 UNITS: 1000 INJECTION, SOLUTION INTRAVENOUS; SUBCUTANEOUS at 16:47

## 2022-09-28 RX ADMIN — CARVEDILOL 12.5 MG: 6.25 TABLET, FILM COATED ORAL at 06:32

## 2022-09-28 ASSESSMENT — ENCOUNTER SYMPTOMS
DIARRHEA: 1
DIZZINESS: 0
SPUTUM PRODUCTION: 0
SHORTNESS OF BREATH: 0
HEADACHES: 0
FEVER: 0
ABDOMINAL PAIN: 0
WEAKNESS: 1
NAUSEA: 0

## 2022-09-28 ASSESSMENT — COGNITIVE AND FUNCTIONAL STATUS - GENERAL
SUGGESTED CMS G CODE MODIFIER MOBILITY: CK
MOVING TO AND FROM BED TO CHAIR: A LOT
STANDING UP FROM CHAIR USING ARMS: A LITTLE
CLIMB 3 TO 5 STEPS WITH RAILING: A LOT
MOBILITY SCORE: 15
MOVING FROM LYING ON BACK TO SITTING ON SIDE OF FLAT BED: A LITTLE
WALKING IN HOSPITAL ROOM: A LITTLE
TURNING FROM BACK TO SIDE WHILE IN FLAT BAD: A LOT

## 2022-09-28 ASSESSMENT — PAIN DESCRIPTION - PAIN TYPE
TYPE: ACUTE PAIN
TYPE: ACUTE PAIN

## 2022-09-28 ASSESSMENT — GAIT ASSESSMENTS
DISTANCE (FEET): 20
ASSISTIVE DEVICE: FRONT WHEEL WALKER
DEVIATION: BRADYKINETIC;SHUFFLED GAIT
GAIT LEVEL OF ASSIST: MINIMAL ASSIST

## 2022-09-28 ASSESSMENT — PATIENT HEALTH QUESTIONNAIRE - PHQ9
1. LITTLE INTEREST OR PLEASURE IN DOING THINGS: NOT AT ALL
2. FEELING DOWN, DEPRESSED, IRRITABLE, OR HOPELESS: NOT AT ALL
SUM OF ALL RESPONSES TO PHQ9 QUESTIONS 1 AND 2: 0

## 2022-09-28 NOTE — PROGRESS NOTES
"Hospital Medicine Daily Progress Note    Date of Service  9/28/2022    Chief Complaint  Germaine Chaparro is a 77 y.o. female admitted 9/26/2022 with ongoing weakness    Hospital Course  As per admitting Hospitalist Dr. Rivera:   \"Germaine Chaparro is a 77 y.o. female who presented 9/26/2022 with weakness. She has h/o HFpEF, DM, HTN, pulm HTN, DOROTHY who was recently started on dialysis 9/11 at [facility] for renal failure. She was discharged home and patient felt she was discharged too early. She says she's been weak. She perks up after dialysis but on Saturday she felt very weak and slid off a chair where her son found her. She doesn't remember falling, may have passed out. She has had issues with hypoglycemia after hospitalization.  She went to [facility] and found with mild hypoMg 1.6 and hypophos 1.9 that was repleted orally. It was discussed with Dr. Verdin who said she might need long-term placement. She was transferred because they do not have dialysis. Family had requested [facility] on transfer.  Currently she feels weak and \"spacey.\" Denies CP, SOB, dizziness, abd pain, nausea. She says her edema is improved from her baseline.\"    Interval Problem Update  Patient stated she feels better but with ongoing loose stools.   Checking stool studies.  HD repeat today, as per nephrology.  PT recommending post acute placement, orders placed.    I have discussed this patient's plan of care and discharge plan at IDT rounds today with Case Management, Nursing, Nursing leadership, and other members of the IDT team.    Consultants/Specialty  nephrology    Code Status  DNAR/DNI    Disposition  Patient is not medically cleared for discharge.   Anticipate discharge to to skilled nursing facility.  I have placed the appropriate orders for post-discharge needs.    Review of Systems  Review of Systems   Constitutional:  Negative for fever and malaise/fatigue.   Respiratory:  Negative for sputum production and shortness of " breath.    Cardiovascular:  Negative for chest pain and leg swelling.   Gastrointestinal:  Positive for diarrhea. Negative for abdominal pain and nausea.   Neurological:  Positive for weakness. Negative for dizziness and headaches.   All other systems reviewed and are negative.     Physical Exam  Temp:  [36.3 °C (97.3 °F)-36.8 °C (98.2 °F)] 36.7 °C (98 °F)  Pulse:  [69-75] 71  Resp:  [18-20] 18  BP: ()/(36-50) 98/36  SpO2:  [93 %-98 %] 97 %    Physical Exam  Vitals and nursing note reviewed.   Constitutional:       General: She is not in acute distress.     Appearance: She is obese. She is ill-appearing.      Comments: Frail appearing elderly female   HENT:      Head: Normocephalic and atraumatic.      Comments: Temporal muscle wasting positive     Mouth/Throat:      Mouth: Mucous membranes are dry.      Pharynx: No oropharyngeal exudate.   Eyes:      General: No scleral icterus.     Extraocular Movements: Extraocular movements intact.   Cardiovascular:      Rate and Rhythm: Normal rate and regular rhythm.      Pulses: Normal pulses.      Heart sounds: Normal heart sounds. No murmur heard.  Pulmonary:      Effort: Pulmonary effort is normal. No respiratory distress.      Breath sounds: Normal breath sounds. No wheezing.   Abdominal:      General: Abdomen is flat. Bowel sounds are normal. There is no distension.      Palpations: Abdomen is soft.      Tenderness: There is no abdominal tenderness.   Musculoskeletal:         General: No swelling or tenderness.      Cervical back: Normal range of motion. No rigidity.      Comments: Sarcopenic. Bilateral hand muscle atrophy noted.   Skin:     Capillary Refill: Capillary refill takes 2 to 3 seconds.      Coloration: Skin is pale. Skin is not jaundiced.      Findings: No erythema.   Neurological:      Mental Status: She is alert and oriented to person, place, and time. Mental status is at baseline.      Motor: Weakness present.   Psychiatric:         Mood and Affect:  Mood normal.         Behavior: Behavior normal.         Thought Content: Thought content normal.         Judgment: Judgment normal.       Fluids    Intake/Output Summary (Last 24 hours) at 9/28/2022 1346  Last data filed at 9/28/2022 0500  Gross per 24 hour   Intake 860 ml   Output 2100 ml   Net -1240 ml       Laboratory  Recent Labs     09/26/22  1901 09/27/22  0256 09/28/22  0411   WBC 11.4* 11.0* 8.8   RBC 2.72* 2.58* 2.71*   HEMOGLOBIN 8.7* 8.2* 8.5*   HEMATOCRIT 26.9* 26.0* 27.2*   MCV 98.9* 100.8* 100.4*   MCH 32.0 31.8 31.4   MCHC 32.3* 31.5* 31.3*   RDW 46.4 46.5 46.8   PLATELETCT 213 199 208   MPV 11.9 12.1 11.9     Recent Labs     09/26/22 1901 09/27/22  0256 09/28/22  0411   SODIUM 132* 138 140   POTASSIUM 3.9 3.6 4.3   CHLORIDE 93* 97 101   CO2 24 25 28   GLUCOSE 169* 133* 140*   BUN 45* 47* 27*   CREATININE 7.38* 7.28* 4.92*   CALCIUM 8.8 8.6  8.5 8.5                   Imaging  EC-ECHOCARDIOGRAM COMPLETE W/O CONT   Final Result      OUTSIDE IMAGES-CT HEAD   Final Result           Assessment/Plan  * Debility- (present on admission)  Assessment & Plan  Weakness after hospital stay and starting dialysis  Has macrocytic anemia  PTOT to eval    Functional diarrhea- (present on admission)  Assessment & Plan  Acute on chronic  Checking stool studies  Labs does not appear to be c.diff    DOROTHY (obstructive sleep apnea)- (present on admission)  Assessment & Plan  CPAP nocturnal    Pulmonary HTN (HCC)- (present on admission)  Assessment & Plan  2015 RVSP 65mmHg  Cont oral lasix    Breast cancer (HCC)  Assessment & Plan  Had resection 2021 and on tamoxifen, continue medication    End stage renal failure on dialysis (HCC)  Assessment & Plan  HD MWF  consulted Dr. Verdin on admission  Will need outpatient HD  Receiving HD today, goal 1L.    Anemia- (present on admission)  Assessment & Plan  Macrocytic anemia. Pt denied GIB symptoms.  Hgb 8.2, Hct 26, .8.   Ferritin 509.  Iron sat 23%.  B12 levels 227, low.  Needs IM replacement and PO.    Hypothyroid- (present on admission)  Assessment & Plan  Cont synthroid    Type 2 diabetes mellitus with hyperglycemia (HCC)- (present on admission)  Assessment & Plan  ISS ordered    Congestive heart failure (HCC)- (present on admission)  Assessment & Plan  Chronic (HFpEF) diastolic dysfunction  No recent TTE. Given possible syncope on Saturday, will check EKG and TTE  Cont coreg, norvasc and lasix    HTN (hypertension)- (present on admission)  Assessment & Plan  Cont coreg, norvasc and lasix       VTE prophylaxis: heparin ppx    I have performed a physical exam and reviewed and updated ROS and Plan today (9/28/2022). In review of yesterday's note (9/27/2022), there are no changes except as documented above.

## 2022-09-28 NOTE — THERAPY
Physical Therapy   Initial Evaluation     Patient Name: Germaine Chaparro  Age:  77 y.o., Sex:  female  Medical Record #: [id number]  Today's Date: 9/28/2022     Precautions  Precautions: Fall Risk    Assessment  Patient is 77 y.o. female with a diagnosis of weakness and debility. PMH of CHF, DM, HTN, pulm HTN, DOROTHY who was recently started on dialysis 9/11 at [facility] for renal failure, was home for a few days but then returned to hospital due to inability to care for herself. Pt is presenting with weakness, impaired balance and decreased activity tolerance. Pt is not at baseline of [place] and therefore recommend SNF for further therapy prior to DC home.      Plan    Recommend Physical Therapy 4 times per week until therapy goals are met for the following treatments:  Bed Mobility, Gait Training, Neuro Re-Education / Balance, Stair Training, Therapeutic Activities, and Therapeutic Exercises    DC Equipment Recommendations: Unable to determine at this time  Discharge Recommendations: Recommend post-acute placement for additional physical therapy services prior to discharge home        09/28/22 1020   Prior Living Situation   Housing / Facility 1 Story House   Steps Into Home 2   Steps In Home 1   Rail Left Rail  (Steps into Home)   Equipment Owned Front-Wheel Walker;4-Wheel Walker;Grab Bar(s) In Tub / Shower;Grab Bar(s) By Toilet   Lives with - Patient's Self Care Capacity Alone and Unable to Care For Self   Comments Pt lives in [place] and has recently started HD, since then has been weak and unable to care for herself at home   Prior Level of Functional Mobility   Bed Mobility Independent   Transfer Status Independent   Ambulation Independent   Assistive Devices Used 4-Wheel Walker   Stairs Independent   History of Falls   History of Falls Yes   Cognition    Level of Consciousness Alert   Comments Oriented x4   Passive ROM Lower Body   Passive ROM Lower Body Not Tested   Active ROM Lower Body    Active  ROM Lower Body  WDL   Strength Lower Body   Lower Body Strength  X   Gross Strength Generalized Weakness, Equal Bilaterally   Strength Upper Body   Upper Body Strength  X   Gross Strength Generalized Weakness, Equal Bilaterally.    Balance Assessment   Sitting Balance (Static) Fair +   Sitting Balance (Dynamic) Fair   Standing Balance (Static) Fair -   Standing Balance (Dynamic) Poor +   Weight Shift Sitting Fair   Weight Shift Standing Fair   Comments stdg with FWW   Gait Analysis   Gait Level Of Assist Minimal Assist   Assistive Device Front Wheel Walker   Distance (Feet) 20   # of Times Distance was Traveled 1   Deviation Bradykinetic;Shuffled Gait   Bed Mobility    Supine to Sit Moderate Assist   Sit to Supine Minimal Assist   Functional Mobility   Sit to Stand Minimal Assist   Bed, Chair, Wheelchair Transfer Minimal Assist   Activity Tolerance   Standing fatigued after ambulating, O2 sats down to 84% after amb on RA   Short Term Goals    Short Term Goal # 1 Pt will be able to perform bed mobility and sup <> sit Barbie in 6 visits.   Short Term Goal # 2 Pt will be able to perform sit <> stand and transfer with FWW Barbie in 6 visits.   Short Term Goal # 3 Pt will be able to ambulate 150 ft with FWW Barbie in 6 visits.

## 2022-09-28 NOTE — CARE PLAN
The patient is Stable - Low risk of patient condition declining or worsening    Shift Goals  Clinical Goals: Pt will not fall this shift.  Patient Goals: Pt will get Dialysis today.    Progress made toward(s) clinical / shift goals:    Pt is getting Dialysis today.    Patient is not progressing towards the following goals:

## 2022-09-28 NOTE — DISCHARGE PLANNING
Received Choice form at 1312  Agency/Facility Name: [facility]  Referral sent per Choice form @ 1348     @1500  Agency[facility]Facility [facility]  Spoke To: Leeanne  Outcome: Per Leeanne, she had a questions regarding where the pt is receiving diaylsis from. DPA transferred call to RN CM assigned to pt  RN CM notified

## 2022-09-28 NOTE — PROGRESS NOTES
"[facility] -  PROGRESS NOTE               Author: Cory Verdin M.D. Date & Time: 9/28/2022  9:20 AM     HPI:  Germaine Chaparro is a 77 y.o. female who presented 9/26/2022 with weakness. She has h/o HFpEF, DM, HTN, pulm HTN, DOROTHY who was recently started on dialysis 9/11 at [facility].  Since discharge she's been weak.  She slid off a chair where her son found her.  Has had issues with hypoglycemia after hospitalization.  She was at [facility] and found with mild hypoMg 1.6 and hypophos 1.9 that was repleted.  Now here for possible placement.      DAILY NEPHROLOGY SUMMARY:  9/28 - No complaints of pain or SOB.  Still feeling fatigued, having diarrhea    REVIEW OF SYSTEMS:    10 point ROS reviewed and is as per HPI/daily summary or otherwise negative    PMH/PSH/SH/FH:   Reviewed and unchanged since admission note    CURRENT MEDICATIONS:   Reviewed from admission to present day    VS:  /50   Pulse 69   Temp 36.3 °C (97.4 °F) (Oral)   Resp 20   Ht 1.676 m (5' 6\")   Wt 115 kg (253 lb 1.4 oz)   SpO2 98%   BMI 40.85 kg/m²     Physical Exam  HENT:      Right Ear: External ear normal.      Left Ear: External ear normal.      Mouth/Throat:      Mouth: Mucous membranes are moist.   Eyes:      General: No scleral icterus.  Cardiovascular:      Rate and Rhythm: Normal rate.   Pulmonary:      Effort: Pulmonary effort is normal.   Abdominal:      Palpations: Abdomen is soft.   Musculoskeletal:      Cervical back: Normal range of motion.      Right lower leg: Edema present.      Left lower leg: Edema present.   Skin:     General: Skin is warm.   Neurological:      General: No focal deficit present.      Mental Status: She is alert.   Psychiatric:         Mood and Affect: Mood normal.      Fluids:  In: 1340 [P.O.:840; Dialysis:500]  Out: 2100     LABS:  Recent Labs     09/26/22  1901 09/27/22  0256 09/28/22  0411   SODIUM 132* 138 140   POTASSIUM 3.9 3.6 4.3   CHLORIDE 93* 97 101   CO2 " 24 25 28   GLUCOSE 169* 133* 140*   BUN 45* 47* 27*   CREATININE 7.38* 7.28* 4.92*   CALCIUM 8.8 8.6  8.5 8.5       IMAGING:   All imaging reviewed from admission to present day    IMPRESSION:  # JEANINE vs ESRD    - Now dialysis a dependent    - Will continue dialysis qMWF  # Weakness    - Management per primary team  # DOROTHY  # Pulmonary HTN  # Breast Cancer  # HTN  - Goal BP < 140/90  - At goal  # Anemia of CKD  - Goal Hgb 10-11  - Iron panel  # CKD-MBD  - PTH pen  - Vit D pen  - Ca 8.5  - PO4 2.5  # Dm  # Hypothyroidism     PLAN:  - HD today then qMWF iHD during stay  - Do not exceed gabapentin 300mg daily in ESRD patient  - UF as tolerated  - No dietary protein or phos restrictions  - Dose all meds per ESRD  - Rest of management per primary team

## 2022-09-28 NOTE — DISCHARGE PLANNING
Case Management Discharge Planning    Admission Date: 9/26/2022  GMLOS: 3.8  ALOS: 2    6-Clicks ADL Score: 18  6-Clicks Mobility Score: 15  PT and/or OT Eval ordered: Yes  Post-acute Referrals Ordered: Yes  Post-acute Choice Obtained: No  Has referral(s) been sent to post-acute provider:  Yes      Anticipated Discharge Dispo: Discharge Disposition: D/T to SNF with Medicare cert in anticipation of skilled care (03)    DME Needed: No    Action(s) Taken: Spoke to pt at bedside regarding SNF. Pt agreeable to DC to SNF as long as they can accommodate her dialysis, pt asked RNCM to collect choice from her son Rene.    Spoke to dialysis coordinator Zohreh. Per Zohreh, no MWF [phone] dialysis times available at this time. RNCM to obtain choice from pt's son Rene and update Zohreh on DC plan.     RNCM spoke to Rene. Rene agreeable to SNF, however, he is concerned about SNF's ability to accommodate pt's dialysis. RNCM Informed Rene that CM will work with dialysis coordinator and SNFs to ensure pt will receive dialysis. Rene would like to speak to Bella (pt's friends) for second opinion. Rene asked RNCM to speak to Bella about SNF, Bella will be at pt's bedside today.    Spoke to pt and Bella at bedside. Receieved verbal approval to send SNF referral to all [facility]. Pt and friend Bella, they would not like to send referrals to [place] at this time. Choice form completed and faxed to [facility].  Bella provided her cell phone number [phone]    Escalations Completed: None    Medically Clear: No    Next Steps: F/u with pt, Bella, and Rene regarding SNF choice. F/u with Zohreh dialysis coordinator.    Barriers to Discharge: Medical clearance, Pending Placement, and Dialysis    Is the patient up for discharge tomorrow: No      Care Transition Team Assessment    Information Source  Orientation Level: Oriented X4  Information Given By: Patient  Informant's Name: Mary Chaparro  Who is responsible for making  decisions for patient? : Patient    Readmission Evaluation  Is this a readmission?: No    Elopement Risk  Legal Hold: No  Ambulatory or Self Mobile in Wheelchair: Yes  Disoriented: No  Psychiatric Symptoms: None  History of Wandering: No  Elopement this Admit: No  Vocalizing Wanting to Leave: No  Displays Behaviors, Body Language Wanting to Leave: No-Not at Risk for Elopement  Elopement Risk: Not at Risk for Elopement    Interdisciplinary Discharge Planning  Lives with - Patient's Self Care Capacity: Alone and Unable to Care For Self  Patient or legal guardian wants to designate a caregiver: No  Support Systems: Children  Housing / Facility: [address] [place]  Durable Medical Equipment: Walker, Other - Specify (CPAP)    Discharge Preparedness  What is your plan after discharge?: Skilled nursing facility  What are your discharge supports?: Child         Finances  Financial Barriers to Discharge: No    Vision / Hearing Impairment  Vision Impairment : Yes  Right Eye Vision: Impaired, Wears Glasses  Left Eye Vision: Impaired, Wears Glasses  Hearing Impairment : Yes  Hearing Impairment: Both Ears, Hearing Device Not Available  Does Pt Need Special Equipment for the Hearing Impaired?: No         Advance Directive  Advance Directive?: DPOA for Health Care  Durable Power of  Name and Contact : Bella Diana [phone]    Domestic Abuse  Have you ever been the victim of abuse or violence?: No  Physical Abuse or Sexual Abuse: No  Verbal Abuse or Emotional Abuse: No  Possible Abuse/Neglect Reported to:: Not Applicable    Psychological Assessment  History of Substance Abuse: None  History of Psychiatric Problems: No  Non-compliant with Treatment: No    Discharge Risks or Barriers  Discharge risks or barriers?: Lives alone, no community support, Complex medical needs (dialysis)  Patient risk factors: Lives alone and no community support, Vulnerable adult    Anticipated Discharge Information  Discharge Disposition: D/T to  SNF with Medicare cert in anticipation of skilled care (03)

## 2022-09-28 NOTE — PROGRESS NOTES
Pt is asleep in bed. Awakes to voice. No pain or n/v at this time. RN discussed POC with pt for the evening. All questions answered. Fall precautions in place.

## 2022-09-28 NOTE — CARE PLAN
The patient is Watcher - Medium risk of patient condition declining or worsening    Shift Goals  Clinical Goals: resting, pain managed, HD  Patient Goals: resting and PT to see    Progress made toward(s) clinical / shift goals:  pt rested, pain managed, HD not done today.     Patient is not progressing towards the following goals:

## 2022-09-29 LAB
GLUCOSE BLD STRIP.AUTO-MCNC: 114 MG/DL (ref 65–99)
GLUCOSE BLD STRIP.AUTO-MCNC: 139 MG/DL (ref 65–99)
GLUCOSE BLD STRIP.AUTO-MCNC: 146 MG/DL (ref 65–99)
GLUCOSE BLD STRIP.AUTO-MCNC: 195 MG/DL (ref 65–99)

## 2022-09-29 PROCEDURE — 97116 GAIT TRAINING THERAPY: CPT

## 2022-09-29 PROCEDURE — 83630 LACTOFERRIN FECAL (QUAL): CPT

## 2022-09-29 PROCEDURE — 97530 THERAPEUTIC ACTIVITIES: CPT

## 2022-09-29 PROCEDURE — 700102 HCHG RX REV CODE 250 W/ 637 OVERRIDE(OP): Performed by: INTERNAL MEDICINE

## 2022-09-29 PROCEDURE — 99232 SBSQ HOSP IP/OBS MODERATE 35: CPT | Performed by: INTERNAL MEDICINE

## 2022-09-29 PROCEDURE — A9270 NON-COVERED ITEM OR SERVICE: HCPCS | Performed by: INTERNAL MEDICINE

## 2022-09-29 PROCEDURE — 83993 ASSAY FOR CALPROTECTIN FECAL: CPT

## 2022-09-29 PROCEDURE — 94660 CPAP INITIATION&MGMT: CPT

## 2022-09-29 PROCEDURE — 700111 HCHG RX REV CODE 636 W/ 250 OVERRIDE (IP): Performed by: INTERNAL MEDICINE

## 2022-09-29 PROCEDURE — 94760 N-INVAS EAR/PLS OXIMETRY 1: CPT

## 2022-09-29 PROCEDURE — 82962 GLUCOSE BLOOD TEST: CPT | Mod: 91

## 2022-09-29 PROCEDURE — 97166 OT EVAL MOD COMPLEX 45 MIN: CPT

## 2022-09-29 PROCEDURE — 770006 HCHG ROOM/CARE - MED/SURG/GYN SEMI*

## 2022-09-29 RX ADMIN — CYANOCOBALAMIN TAB 500 MCG 1000 MCG: 500 TAB at 05:19

## 2022-09-29 RX ADMIN — HEPARIN SODIUM 5000 UNITS: 5000 INJECTION, SOLUTION INTRAVENOUS; SUBCUTANEOUS at 05:20

## 2022-09-29 RX ADMIN — CARVEDILOL 12.5 MG: 6.25 TABLET, FILM COATED ORAL at 07:36

## 2022-09-29 RX ADMIN — GABAPENTIN 300 MG: 300 CAPSULE ORAL at 05:20

## 2022-09-29 RX ADMIN — AMITRIPTYLINE HYDROCHLORIDE 10 MG: 10 TABLET, FILM COATED ORAL at 17:47

## 2022-09-29 RX ADMIN — HEPARIN SODIUM 5000 UNITS: 5000 INJECTION, SOLUTION INTRAVENOUS; SUBCUTANEOUS at 22:21

## 2022-09-29 RX ADMIN — AMITRIPTYLINE HYDROCHLORIDE 10 MG: 10 TABLET, FILM COATED ORAL at 05:19

## 2022-09-29 RX ADMIN — TAMOXIFEN CITRATE 20 MG: 10 TABLET ORAL at 05:19

## 2022-09-29 RX ADMIN — THIAMINE HCL TAB 100 MG 100 MG: 100 TAB at 05:19

## 2022-09-29 RX ADMIN — CARVEDILOL 12.5 MG: 6.25 TABLET, FILM COATED ORAL at 17:47

## 2022-09-29 RX ADMIN — HEPARIN SODIUM 5000 UNITS: 5000 INJECTION, SOLUTION INTRAVENOUS; SUBCUTANEOUS at 15:02

## 2022-09-29 RX ADMIN — LEVOTHYROXINE SODIUM 100 MCG: 0.1 TABLET ORAL at 05:20

## 2022-09-29 RX ADMIN — AMLODIPINE BESYLATE 5 MG: 5 TABLET ORAL at 05:19

## 2022-09-29 RX ADMIN — FUROSEMIDE 80 MG: 40 TABLET ORAL at 05:20

## 2022-09-29 ASSESSMENT — COGNITIVE AND FUNCTIONAL STATUS - GENERAL
DRESSING REGULAR LOWER BODY CLOTHING: A LOT
SUGGESTED CMS G CODE MODIFIER MOBILITY: CJ
DAILY ACTIVITIY SCORE: 15
MOBILITY SCORE: 20
STANDING UP FROM CHAIR USING ARMS: A LITTLE
CLIMB 3 TO 5 STEPS WITH RAILING: A LITTLE
DRESSING REGULAR UPPER BODY CLOTHING: A LITTLE
PERSONAL GROOMING: A LITTLE
EATING MEALS: A LITTLE
HELP NEEDED FOR BATHING: A LOT
TOILETING: A LOT
WALKING IN HOSPITAL ROOM: A LITTLE
SUGGESTED CMS G CODE MODIFIER DAILY ACTIVITY: CK
MOVING FROM LYING ON BACK TO SITTING ON SIDE OF FLAT BED: A LITTLE

## 2022-09-29 ASSESSMENT — ACTIVITIES OF DAILY LIVING (ADL): TOILETING: INDEPENDENT

## 2022-09-29 ASSESSMENT — PAIN DESCRIPTION - PAIN TYPE
TYPE: ACUTE PAIN
TYPE: ACUTE PAIN

## 2022-09-29 ASSESSMENT — ENCOUNTER SYMPTOMS
CHILLS: 0
WEAKNESS: 1
HEADACHES: 0
ABDOMINAL PAIN: 0
DIARRHEA: 1

## 2022-09-29 ASSESSMENT — GAIT ASSESSMENTS
DEVIATION: STEP TO
ASSISTIVE DEVICE: FRONT WHEEL WALKER
GAIT LEVEL OF ASSIST: SUPERVISED
DISTANCE (FEET): 80
DISTANCE (FEET): 30

## 2022-09-29 NOTE — THERAPY
"Occupational Therapy   Initial Evaluation     Patient Name: Germaine Chaparro  Age:  77 y.o., Sex:  female  Medical Record #: [id number]  Today's Date: 9/29/2022     Precautions  Precautions: Fall Risk    Assessment  Patient is 77 y.o. female presented 9/26/2022 with weakness. She has h/o HFpEF, DM, HTN, pulm HTN, DOROTHY who was recently started on dialysis 9/11 at [facility] for renal failure. She was discharged home and patient felt she was discharged too early. She says she's been weak. on Saturday she felt very weak andslid off a chair where her son found her. She doesn't remember falling, may have passed out. PMH of Breast cancer, Arthritis, Asthma, Breath shortness, Bronchitis (1998), Congestive heart failure (HCC), Diabetes (HCC), Encounter for renal dialysis (07/2014), Heart murmur, Heart valve disease, High cholesterol, Hypertension, Hypothyroid, Low back pain, Numbness and tingling in hands, Pain (3/4/16), Renal disorder, Snoring, Unspecified cataract, Unspecified urinary incontinence, and Urinary bladder disorder.  Currently pt presents with generalized weakness and impaired balance and activity tolerance affecting her safety with ADL's and mobility. SHe is not safe to return home alone and would benefit from further inpt post acute therapy prior to home.  OT will follow while in house.     Plan    Recommend Occupational Therapy 3 times per week until therapy goals are met for the following treatments:  Adaptive Equipment, Neuro Re-Education / Balance, Self Care/Activities of Daily Living, Therapeutic Activities, and Therapeutic Exercises.    DC Equipment Recommendations: Unable to determine at this time  Discharge Recommendations: Recommend post-acute placement for additional occupational therapy services prior to discharge home     Subjective    \"No I don't think I could manage on my own at home.\"     Objective       09/29/22 1040   Prior Living Situation   Prior Services Home-Independent;Housekeeping / " Homemaker Services   Housing / Facility 1 Story House   Steps Into Home 2   Steps In Home 1   Rail Left Rail  (Steps into Home)   Bathroom Set up Walk In Shower;Shower Chair;Grab Bars   Equipment Owned Front-Wheel Walker;4-Wheel Walker;Tub / Shower Seat;Grab Bar(s) In Tub / Shower;Grab Bar(s) By Toilet;Hand Held Shower;Sock Aid;Reacher   Lives with - Patient's Self Care Capacity Alone and Unable to Care For Self   Comments Pt lives in [place] alone, and has been unable to care for self since starting dialysis recently.  Used to be able to manage most IADL's, always had friend go with her grocery shopping or help with longer drives   Prior Level of ADL Function   Self Feeding Independent   Grooming / Hygiene Independent   Bathing Independent   Dressing Independent   Toileting Independent   Comments Pt has been wearing Depends full time for urinary incontinence and has been battling chronic diarrhea for years   Prior Level of IADL Function   Medication Management Independent   Laundry Independent  (reports folding towels has become more difficult)   Kitchen Mobility Independent   Finances Independent   Home Management Requires Assist   Shopping Requires Assist   Prior Level Of Mobility Independent With Device in Home   Driving / Transportation Driving Independent  (only short distances in town)   Occupation (Pre-Hospital Vocational) Retired Due To Age   History of Falls   History of Falls Yes   Date of Last Fall 09/26/22  (reason for this admit)   Precautions   Precautions Fall Risk   Vitals   Pulse Oximetry 95 %   O2 (LPM) 3   O2 Delivery Device Silicone Nasal Cannula   Pain 0 - 10 Group   Location Generalized   Therapist Pain Assessment During Activity;Nurse Notified   Cognition    Level of Consciousness Alert   Comments Oriented x4   Active ROM Upper Body   Active ROM Upper Body  X   Dominant Hand Right   Comments limited end range in B shoulders, reports diff since neck surgery   Strength Upper Body   Upper Body  Strength  X   Gross Strength Generalized Weakness, Equal Bilaterally.    Balance Assessment   Sitting Balance (Static) Fair +   Sitting Balance (Dynamic) Fair   Standing Balance (Static) Fair -   Standing Balance (Dynamic) Fair -   Weight Shift Sitting Fair   Weight Shift Standing Fair   Comments with FWW, balance worsens with fatigue   Bed Mobility    Supine to Sit Contact Guard Assist  (with HOB elevated)   Sit to Supine   ([place] post)   Scooting Standby Assist   Comments Pt had dialysis yesterday afternoon, and had just finished lots of rolling in bed for clean up and full linen change prior to OT, so she reports feeling stronger today   ADL Assessment   Eating Modified Independent   Grooming Standby Assist;Seated;Standing  (hands standing, face and teeth seated)   Upper Body Dressing Minimal Assist   Lower Body Dressing Maximal Assist   Toileting Maximal Assist   Comments incont of stool and urine   How much help from another person does the patient currently need...   Putting on and taking off regular lower body clothing? 2   Bathing (including washing, rinsing, and drying)? 2   Toileting, which includes using a toilet, bedpan, or urinal? 2   Putting on and taking off regular upper body clothing? 3   Taking care of personal grooming such as brushing teeth? 3   Eating meals? 3   6 Clicks Daily Activity Score 15   Functional Mobility   Sit to Stand Contact Guard Assist   Bed, Chair, Wheelchair Transfer Contact Guard Assist   Transfer Method Stand Step   Mobility Lata with FWW in room, legs shaky with walking noemi with fatigue   Distance (Feet) 30   # of Times Distance was Traveled 1   Visual Perception   Visual Perception  WDL   Activity Tolerance   Sitting in Chair > 20 min   Sitting Edge of Bed 6 min   Standing Fatigued after short walk, legs shaky,   Patient / Family Goals   Patient / Family Goal #1 get stronger   Short Term Goals   Short Term Goal # 1 Pt will tolerate standing 10 min at sink for grooming with  SBA in 4 visits   Short Term Goal # 2 Pt will toilet in BR with CGA in 5 visits   Short Term Goal # 3 pt will dress from seated with setup and AE in 5 visits

## 2022-09-29 NOTE — CARE PLAN
The patient is Stable - Low risk of patient condition declining or worsening    Shift Goals  Clinical Goals: pt will ambulate with nursing staff by the end of shift  Patient Goals: rest, comfort    Progress made toward(s) clinical / shift goals:  Pt ambulated with nursing staff throughout shift, progressing on other goals     Patient is not progressing towards the following goals:

## 2022-09-29 NOTE — DISCHARGE PLANNING
Case Management Discharge Planning    Admission Date: 9/26/2022  GMLOS: 3.8  ALOS: 3    6-Clicks ADL Score: 18  6-Clicks Mobility Score: 15  PT and/or OT Eval ordered: Yes  Post-acute Referrals Ordered: Yes  Post-acute Choice Obtained: Yes  Has referral(s) been sent to post-acute provider:  Yes      Anticipated Discharge Dispo: Discharge Disposition: D/T to SNF with Medicare cert in anticipation of skilled care (03)    DME Needed: No    Action(s) Taken:     Per chart pt accepted by [facility]. RNCM called Nancie from [facility] to ensure pt's dialysis can be accommodated. Per Nancie, they cannot accommodate pt's dialysis at this time but may be able to in a week. Preferred dialysis schedule for [facility] is T/TH/S between [id number] at [facility].    Escalations Completed: None    Medically Clear: No    Next Steps: f/u with dialysis coordinator regarding dialysis chair    Barriers to Discharge: Medical clearance, Pending Placement, and Dialysis    Is the patient up for discharge tomorrow: No

## 2022-09-29 NOTE — CARE PLAN
The patient is Stable - Low risk of patient condition declining or worsening    Shift Goals  Clinical Goals: collect stool sample from patient  Patient Goals: pt will rest and sleep comfortably tonight    Progress made toward(s) clinical / shift goals:  Pt seen sleeping and resting comfortably during rounding; RN unable to collect stool sample during the shift, pt instructed to call for assistance with use of bedpan or BSC in case of BM tonight. Progressing on other goals.    Patient is not progressing towards the following goals: NA

## 2022-09-29 NOTE — THERAPY
Physical Therapy   Daily Treatment     Patient Name: Germaine Chaparro  Age:  77 y.o., Sex:  female  Medical Record #: [id number]  Today's Date: 9/29/2022     Precautions  Precautions: Fall Risk    Assessment    Pt reports feeling a lot better today,transfers and ambulation improving    Plan    Continue current treatment plan.    DC Equipment Recommendations: Unable to determine at this time  Discharge Recommendations: Recommend post-acute placement for additional physical therapy services prior to discharge home       Objective       09/29/22 1428   Charge Group   PT Gait Training 1   PT Therapeutic Activities 1   Balance   Sitting Balance (Static) Fair +   Sitting Balance (Dynamic) Fair +   Standing Balance (Static) Fair   Standing Balance (Dynamic) Fair   Weight Shift Sitting Fair   Weight Shift Standing Fair   Gait Analysis   Gait Level Of Assist Supervised   Assistive Device Front Wheel Walker   Distance (Feet) 80   # of Times Distance was Traveled 1   Deviation Step To   # of Stairs Climbed 0   Weight Bearing Status full   Bed Mobility    Supine to Sit Modified Independent   Sit to Supine Modified Independent   Scooting Modified Independent   Functional Mobility   Sit to Stand Supervised   Bed, Chair, Wheelchair Transfer Supervised   Transfer Method Stand Step   How much difficulty does the patient currently have...   Turning over in bed (including adjusting bedclothes, sheets and blankets)? 4   Sitting down on and standing up from a chair with arms (e.g., wheelchair, bedside commode, etc.) 3   Moving from lying on back to sitting on the side of the bed? 4   How much help from another person does the patient currently need...   Moving to and from a bed to a chair (including a wheelchair)? 3   Need to walk in a hospital room? 3   Climbing 3-5 steps with a railing? 3   6 clicks Mobility Score 20   Activity Tolerance   Sitting Edge of Bed 10   Standing 10   Short Term Goals    Short Term Goal # 1 Pt will be able  to perform bed mobility and sup <> sit Barbie in 6 visits.   Goal Outcome # 1 Goal met   Short Term Goal # 2 Pt will be able to perform sit <> stand and transfer with FWW Barbie in 6 visits.   Goal Outcome # 2 Progressing as expected   Short Term Goal # 3 Pt will be able to ambulate 150 ft with FWW Barbie in 6 visits.   Goal Outcome # 3 Progressing as expected   Interdisciplinary Plan of Care Collaboration   IDT Collaboration with  Nursing   Session Information   Date / Session Number  9/29-2 2/4 10/4

## 2022-09-29 NOTE — PROGRESS NOTES
Hemodialysis done today, started @ 1328 and ended @ 1630 with no fluid removed as ordered. Report given to DENNIS ferris. See flow sheet for details

## 2022-09-29 NOTE — PROGRESS NOTES
"[facility] -  PROGRESS NOTE               Author: Cory Verdin M.D. Date & Time: 9/29/2022  8:54 AM     HPI:  Germaine Chaparro is a 77 y.o. female who presented 9/26/2022 with weakness. She has h/o HFpEF, DM, HTN, pulm HTN, DOROTHY who was recently started on dialysis 9/11 at [facility].  Since discharge she's been weak.  She slid off a chair where her son found her.  Has had issues with hypoglycemia after hospitalization.  She was at [facility] and found with mild hypoMg 1.6 and hypophos 1.9 that was repleted.  Now here for possible placement.      DAILY NEPHROLOGY SUMMARY:  9/28 - No complaints of pain or SOB.  Still feeling fatigued, having diarrhea  9/29 - Feeling more energy today.  Tolerated HD yesterday.    REVIEW OF SYSTEMS:    10 point ROS reviewed and is as per HPI/daily summary or otherwise negative    PMH/PSH/SH/FH:   Reviewed and unchanged since admission note    CURRENT MEDICATIONS:   Reviewed from admission to present day    VS:  /39   Pulse 79   Temp 36.9 °C (98.4 °F) (Temporal)   Resp 18   Ht 1.676 m (5' 6\")   Wt 115 kg (253 lb 1.4 oz)   SpO2 93%   BMI 40.85 kg/m²     Physical Exam  HENT:      Right Ear: External ear normal.      Left Ear: External ear normal.      Mouth/Throat:      Mouth: Mucous membranes are moist.   Eyes:      General: No scleral icterus.  Cardiovascular:      Rate and Rhythm: Normal rate.   Pulmonary:      Effort: Pulmonary effort is normal.   Abdominal:      Palpations: Abdomen is soft.   Musculoskeletal:      Cervical back: Normal range of motion.      Right lower leg: Edema present.      Left lower leg: Edema present.   Skin:     General: Skin is warm.   Neurological:      General: No focal deficit present.      Mental Status: She is alert.   Psychiatric:         Mood and Affect: Mood normal.      Fluids:  In: 1340 [P.O.:840; Dialysis:500]  Out: 950     LABS:  Recent Labs     09/26/22  1901 09/27/22  0256 09/28/22  0411   SODIUM " 132* 138 140   POTASSIUM 3.9 3.6 4.3   CHLORIDE 93* 97 101   CO2 24 25 28   GLUCOSE 169* 133* 140*   BUN 45* 47* 27*   CREATININE 7.38* 7.28* 4.92*   CALCIUM 8.8 8.6  8.5 8.5         IMAGING:   All imaging reviewed from admission to present day    IMPRESSION:  # JEANINE vs ESRD    - Now dialysis a dependent    - Will continue dialysis qMWF  # Weakness    - Management per primary team  # DOROTHY  # Pulmonary HTN  # Breast Cancer  # HTN  - Goal BP < 140/90  - At goal  # Anemia of CKD  - Goal Hgb 10-11  - Epo with HD  # CKD-MBD  - PTH pen  - Vit D pen  - Ca 8.5  - PO4 2.5  # Dm  # Hypothyroidism     PLAN:  - No HD today ,cont HD qMWF  - Epo with HD  - Do not exceed gabapentin 300mg daily in ESRD patient  - UF as tolerated  - No dietary protein or phos restrictions  - Dose all meds per ESRD  - Rest of management per primary team

## 2022-09-29 NOTE — PROGRESS NOTES
"Hospital Medicine Daily Progress Note    Date of Service  9/29/2022    Chief Complaint  Germaine Chaparro is a 77 y.o. female admitted 9/26/2022 with ongoing weakness    Hospital Course  As per admitting Hospitalist Dr. Rivera:   \"Germaine Chaparro is a 77 y.o. female who presented 9/26/2022 with weakness. She has h/o HFpEF, DM, HTN, pulm HTN, DOROTHY who was recently started on dialysis 9/11 at [facility] for renal failure. She was discharged home and patient felt she was discharged too early. She says she's been weak. She perks up after dialysis but on Saturday she felt very weak and slid off a chair where her son found her. She doesn't remember falling, may have passed out. She has had issues with hypoglycemia after hospitalization.  She went to [facility] and found with mild hypoMg 1.6 and hypophos 1.9 that was repleted orally. It was discussed with Dr. Verdin who said she might need long-term placement. She was transferred because they do not have dialysis. Family had requested [facility] on transfer.  Currently she feels weak and \"spacey.\" Denies CP, SOB, dizziness, abd pain, nausea. She says her edema is improved from her baseline.\"    Interval Problem Update  Patient stated she feels better but with ongoing loose stools,  pending stool studies.  HD tomorrow, as per nephrology.  PT recommending post acute placement, orders placed. [facility] cant accept for one week. Declined by other SNFs.    I have discussed this patient's plan of care and discharge plan at IDT rounds today with Case Management, Nursing, Nursing leadership, and other members of the IDT team.    Consultants/Specialty  nephrology    Code Status  DNAR/DNI    Disposition  Patient is not medically cleared for discharge.   Anticipate discharge to to skilled nursing facility.  I have placed the appropriate orders for post-discharge needs.    Review of Systems  Review of Systems   Constitutional:  Positive for malaise/fatigue. Negative for chills. "   Gastrointestinal:  Positive for diarrhea. Negative for abdominal pain.   Neurological:  Positive for weakness. Negative for headaches.   All other systems reviewed and are negative.     Physical Exam  Temp:  [36.6 °C (97.8 °F)-37.2 °C (98.9 °F)] 36.8 °C (98.2 °F)  Pulse:  [63-79] 76  Resp:  [17-18] 18  BP: (110-131)/(35-52) 128/45  SpO2:  [93 %-95 %] 95 %    Physical Exam  Vitals and nursing note reviewed.   Constitutional:       General: She is not in acute distress.     Appearance: She is obese. She is not ill-appearing.      Comments: Frail appearing elderly female   HENT:      Head:      Comments: Temporal muscle wasting positive  Cardiovascular:      Rate and Rhythm: Normal rate and regular rhythm.      Pulses: Normal pulses.      Heart sounds: Normal heart sounds. No murmur heard.  Pulmonary:      Effort: Pulmonary effort is normal.      Breath sounds: Normal breath sounds.   Abdominal:      General: Abdomen is flat. Bowel sounds are normal.      Palpations: Abdomen is soft.   Musculoskeletal:         General: No swelling or tenderness.      Comments: Sarcopenic. Bilateral hand muscle atrophy noted.   Skin:     Coloration: Skin is not jaundiced.      Findings: No erythema.   Neurological:      Mental Status: She is alert and oriented to person, place, and time. Mental status is at baseline.      Motor: Weakness present.   Psychiatric:         Mood and Affect: Mood normal.         Behavior: Behavior normal.       Fluids    Intake/Output Summary (Last 24 hours) at 9/29/2022 1225  Last data filed at 9/29/2022 0847  Gross per 24 hour   Intake 1340 ml   Output 2150 ml   Net -810 ml       Laboratory  Recent Labs     09/26/22  1901 09/27/22  0256 09/28/22  0411   WBC 11.4* 11.0* 8.8   RBC 2.72* 2.58* 2.71*   HEMOGLOBIN 8.7* 8.2* 8.5*   HEMATOCRIT 26.9* 26.0* 27.2*   MCV 98.9* 100.8* 100.4*   MCH 32.0 31.8 31.4   MCHC 32.3* 31.5* 31.3*   RDW 46.4 46.5 46.8   PLATELETCT 213 199 208   MPV 11.9 12.1 11.9     Recent  Labs     09/26/22  1901 09/27/22  0256 09/28/22  0411   SODIUM 132* 138 140   POTASSIUM 3.9 3.6 4.3   CHLORIDE 93* 97 101   CO2 24 25 28   GLUCOSE 169* 133* 140*   BUN 45* 47* 27*   CREATININE 7.38* 7.28* 4.92*   CALCIUM 8.8 8.6  8.5 8.5                   Imaging  EC-ECHOCARDIOGRAM COMPLETE W/O CONT   Final Result      OUTSIDE IMAGES-CT HEAD   Final Result           Assessment/Plan  * Debility- (present on admission)  Assessment & Plan  Weakness after hospital stay and starting dialysis  Has macrocytic anemia  Pending [facility] snf    End stage renal failure on dialysis (HCC)  Assessment & Plan  HD MWF  consulted Dr. Verdin on admission  Will need outpatient HD. [facility] needs T/TH/S schedule at [facility].  Had HD Wednesday, removed 1L.    Functional diarrhea- (present on admission)  Assessment & Plan  Acute on chronic  Checking stool studies  Labs does not appear to be c.diff    DOROTHY (obstructive sleep apnea)- (present on admission)  Assessment & Plan  CPAP nocturnal    Pulmonary HTN (HCC)- (present on admission)  Assessment & Plan  2015 RVSP 65mmHg  New echo 40mmHg, improved  Cont oral lasix    Breast cancer (HCC)  Assessment & Plan  Had resection 2021 and on tamoxifen, continue medication    Anemia- (present on admission)  Assessment & Plan  Macrocytic anemia. Pt denied GIB symptoms.  Hgb 8.2, Hct 26, .8.   Ferritin 509.  Iron sat 23%.  B12 levels 227, low. Needs IM replacement and PO.    Hypothyroid- (present on admission)  Assessment & Plan  Cont synthroid    Type 2 diabetes mellitus with hyperglycemia (HCC)- (present on admission)  Assessment & Plan  ISS ordered    Congestive heart failure (HCC)- (present on admission)  Assessment & Plan  Chronic (HFpEF) diastolic dysfunction  No recent TTE. Given possible syncope on Saturday  Cont coreg, norvasc and lasix    New echo LVEF 65%, RVSP 40mmHg, grade 2 diastolic dysfunction    HTN (hypertension)- (present on admission)  Assessment & Plan  Cont coreg, norvasc  and lasix     VTE prophylaxis: heparin ppx    I have performed a physical exam and reviewed and updated ROS and Plan today (9/29/2022). In review of yesterday's note (9/28/2022), there are no changes except as documented above.

## 2022-09-29 NOTE — DIETARY
"Nutrition services: Day 3 of admit.  Germaine Chaparro is a 77 y.o. female with admitting DX of Failure to thrive in adult.    Consult received for dx FTT. RD visited pt at bedside. Pt denies changes in PO intake or any unplanned wt changes over past 12 months. Reports UBW is ~260 lb, though says that it has recently fluctuated up/down which she attributes to fluid changes w/ start of HD earlier this month.    Assessment:  Height: 167.6 cm (5' 6\")  Weight: 115 kg (253 lb 1.4 oz) - via bed scale  Body mass index is 40.85 kg/m²., Pt with BMI >40 (=Body mass index is 40.85 kg/m².), Class III obesity. Weight loss counseling not appropriate in acute care setting.   Diet/Intake: Renal; % x 4 meals this admit; 25-50% x 1 meal (pt states lunch was very large today)    Evaluation:   PMHx includes HFpEF, DM, HTN, pulm HTN, DOROTHY, on HD since 9/11 for renal failure. Dx list: debility, breast cancer, functional diarrhea, hypothyroid, pulmonary HTN.  Nephrology following, last HD 9/28, MWF schedule  Labs (9/28): glu 140, BUN 27, crea 4.92, GFR 9 POC glu (9/28-29)   MAR: cyanocobalamin, lasix, SSI, synthroid, senna, thiamine  +3L O2 via nasal cannula  No signs of fat/muscle wasting noted on brief physical exam    Malnutrition Risk: 2 ASPEN criteria not met    Recommendations/Plan:  Encourage intake of meals >50-75%.  Document intake of all PO as % taken in ADL's to provide interdisciplinary communication across all shifts.   Monitor weight.  Nutrition rep will continue to see patient for ongoing meal and snack preferences.   If appropriate at DC please refer to outpatient nutrition services for weight management.      RD available PRN.  "

## 2022-09-29 NOTE — PROGRESS NOTES
Received report from day shift RN. Assumed care of pt. Pt A&O X 4, on 3L NC . Pt denies any pain at this time. Pt updated regarding need for stool sample. Pt updated with POC, fall precautions in place, call light within reach. All needs met at this time.

## 2022-09-29 NOTE — PROGRESS NOTES
Received report from NOC RN. Pt is A&Ox4. VSS. Pt has no current complaints of pain at this time. Updated pt on POC to ambulate with nursing staff today. Safety precautions in place.

## 2022-09-29 NOTE — PROGRESS NOTES
Urgent HD treatment ordered by Dr Schmitz started at  2343 and ended at 0213 with net UF of 3 Liters as bp tolerated. Newly placed right IJ CVC, verified placement prior to use via x ray was patent with good BFR x 2 entire treatment. Hypertensive pre, intra and post treatment.

## 2022-09-29 NOTE — FLOWSHEET NOTE
09/29/22 0703   Events/Summary/Plan   Events/Summary/Plan CPAP check pt off on NC 3Lpm   Skin Inspection Respiratory Device Intact   Vital Signs   Pulse 79   Respiration 18   Pulse Oximetry 93 %   $ Pulse Oximetry (Spot Check) Yes   Respiratory Assessment   Respiratory Pattern Within Normal Limits   Level of Consciousness Alert   Chest Exam   Work Of Breathing / Effort Within Normal Limits   Breath Sounds   RUL Breath Sounds Diminished   RML Breath Sounds Diminished   RLL Breath Sounds Diminished   FRANCHESCA Breath Sounds Diminished   LLL Breath Sounds Diminished   Secretions   Cough Non Productive   Oxygen   O2 (LPM) 3   O2 Delivery Device Silicone Nasal Cannula   Non-Invasive Ventilation DOROTHY Group   Nocturnal CPAP or BIPAP CPAP - Home Unit   $ System Evaluation Yes

## 2022-09-30 LAB
ALBUMIN SERPL BCP-MCNC: 3.6 G/DL (ref 3.2–4.9)
BUN SERPL-MCNC: 31 MG/DL (ref 8–22)
CALCIUM SERPL-MCNC: 8.7 MG/DL (ref 8.4–10.2)
CHLORIDE SERPL-SCNC: 103 MMOL/L (ref 96–112)
CO2 SERPL-SCNC: 27 MMOL/L (ref 20–33)
CREAT SERPL-MCNC: 5.36 MG/DL (ref 0.5–1.4)
ERYTHROCYTE [DISTWIDTH] IN BLOOD BY AUTOMATED COUNT: 49.6 FL (ref 35.9–50)
GFR SERPLBLD CREATININE-BSD FMLA CKD-EPI: 8 ML/MIN/1.73 M 2
GLUCOSE BLD STRIP.AUTO-MCNC: 145 MG/DL (ref 65–99)
GLUCOSE BLD STRIP.AUTO-MCNC: 146 MG/DL (ref 65–99)
GLUCOSE BLD STRIP.AUTO-MCNC: 162 MG/DL (ref 65–99)
GLUCOSE BLD STRIP.AUTO-MCNC: 171 MG/DL (ref 65–99)
GLUCOSE SERPL-MCNC: 132 MG/DL (ref 65–99)
HCT VFR BLD AUTO: 27.4 % (ref 37–47)
HGB BLD-MCNC: 8.4 G/DL (ref 12–16)
MAGNESIUM SERPL-MCNC: 1.6 MG/DL (ref 1.5–2.5)
MCH RBC QN AUTO: 31.8 PG (ref 27–33)
MCHC RBC AUTO-ENTMCNC: 30.7 G/DL (ref 33.6–35)
MCV RBC AUTO: 103.8 FL (ref 81.4–97.8)
PHOSPHATE SERPL-MCNC: 3.3 MG/DL (ref 2.5–4.5)
PLATELET # BLD AUTO: 196 K/UL (ref 164–446)
PMV BLD AUTO: 11.5 FL (ref 9–12.9)
POTASSIUM SERPL-SCNC: 4.1 MMOL/L (ref 3.6–5.5)
RBC # BLD AUTO: 2.64 M/UL (ref 4.2–5.4)
SODIUM SERPL-SCNC: 143 MMOL/L (ref 135–145)
WBC # BLD AUTO: 7.6 K/UL (ref 4.8–10.8)

## 2022-09-30 PROCEDURE — 83735 ASSAY OF MAGNESIUM: CPT

## 2022-09-30 PROCEDURE — 36415 COLL VENOUS BLD VENIPUNCTURE: CPT

## 2022-09-30 PROCEDURE — 700111 HCHG RX REV CODE 636 W/ 250 OVERRIDE (IP): Performed by: STUDENT IN AN ORGANIZED HEALTH CARE EDUCATION/TRAINING PROGRAM

## 2022-09-30 PROCEDURE — 99232 SBSQ HOSP IP/OBS MODERATE 35: CPT | Performed by: INTERNAL MEDICINE

## 2022-09-30 PROCEDURE — 700102 HCHG RX REV CODE 250 W/ 637 OVERRIDE(OP): Performed by: INTERNAL MEDICINE

## 2022-09-30 PROCEDURE — 700111 HCHG RX REV CODE 636 W/ 250 OVERRIDE (IP): Performed by: INTERNAL MEDICINE

## 2022-09-30 PROCEDURE — 82962 GLUCOSE BLOOD TEST: CPT | Mod: 91

## 2022-09-30 PROCEDURE — 80069 RENAL FUNCTION PANEL: CPT

## 2022-09-30 PROCEDURE — 94660 CPAP INITIATION&MGMT: CPT

## 2022-09-30 PROCEDURE — A9270 NON-COVERED ITEM OR SERVICE: HCPCS | Performed by: INTERNAL MEDICINE

## 2022-09-30 PROCEDURE — 770006 HCHG ROOM/CARE - MED/SURG/GYN SEMI*

## 2022-09-30 PROCEDURE — 85027 COMPLETE CBC AUTOMATED: CPT

## 2022-09-30 PROCEDURE — 90935 HEMODIALYSIS ONE EVALUATION: CPT

## 2022-09-30 PROCEDURE — 94760 N-INVAS EAR/PLS OXIMETRY 1: CPT

## 2022-09-30 RX ORDER — HEPARIN SODIUM 1000 [USP'U]/ML
4000 INJECTION, SOLUTION INTRAVENOUS; SUBCUTANEOUS
Status: COMPLETED | OUTPATIENT
Start: 2022-09-30 | End: 2022-09-30

## 2022-09-30 RX ORDER — LANOLIN ALCOHOL/MO/W.PET/CERES
400 CREAM (GRAM) TOPICAL 2 TIMES DAILY
Status: DISCONTINUED | OUTPATIENT
Start: 2022-09-30 | End: 2022-10-03 | Stop reason: HOSPADM

## 2022-09-30 RX ADMIN — EPOETIN ALFA-EPBX 6000 UNITS: 3000 INJECTION, SOLUTION INTRAVENOUS; SUBCUTANEOUS at 09:00

## 2022-09-30 RX ADMIN — LEVOTHYROXINE SODIUM 100 MCG: 0.1 TABLET ORAL at 06:04

## 2022-09-30 RX ADMIN — HEPARIN SODIUM 3300 UNITS: 1000 INJECTION, SOLUTION INTRAVENOUS; SUBCUTANEOUS at 12:30

## 2022-09-30 RX ADMIN — ALTEPLASE 2 MG: 2.2 INJECTION, POWDER, LYOPHILIZED, FOR SOLUTION INTRAVENOUS at 11:00

## 2022-09-30 RX ADMIN — ALTEPLASE 2 MG: 2.2 INJECTION, POWDER, LYOPHILIZED, FOR SOLUTION INTRAVENOUS at 10:35

## 2022-09-30 RX ADMIN — CARVEDILOL 12.5 MG: 6.25 TABLET, FILM COATED ORAL at 17:13

## 2022-09-30 RX ADMIN — THIAMINE HCL TAB 100 MG 100 MG: 100 TAB at 06:04

## 2022-09-30 RX ADMIN — CYANOCOBALAMIN TAB 500 MCG 1000 MCG: 500 TAB at 06:05

## 2022-09-30 RX ADMIN — AMITRIPTYLINE HYDROCHLORIDE 10 MG: 10 TABLET, FILM COATED ORAL at 06:04

## 2022-09-30 RX ADMIN — MAGNESIUM GLUCONATE 500 MG ORAL TABLET 400 MG: 500 TABLET ORAL at 17:15

## 2022-09-30 RX ADMIN — TAMOXIFEN CITRATE 20 MG: 10 TABLET ORAL at 06:04

## 2022-09-30 RX ADMIN — ALTEPLASE 2 MG: 2.2 INJECTION, POWDER, LYOPHILIZED, FOR SOLUTION INTRAVENOUS at 13:30

## 2022-09-30 RX ADMIN — AMLODIPINE BESYLATE 5 MG: 5 TABLET ORAL at 06:07

## 2022-09-30 RX ADMIN — AMITRIPTYLINE HYDROCHLORIDE 10 MG: 10 TABLET, FILM COATED ORAL at 17:13

## 2022-09-30 RX ADMIN — FUROSEMIDE 80 MG: 40 TABLET ORAL at 06:04

## 2022-09-30 RX ADMIN — GABAPENTIN 300 MG: 300 CAPSULE ORAL at 06:05

## 2022-09-30 RX ADMIN — HEPARIN SODIUM 1500 UNITS: 1000 INJECTION, SOLUTION INTRAVENOUS; SUBCUTANEOUS at 11:00

## 2022-09-30 RX ADMIN — HEPARIN SODIUM 5000 UNITS: 5000 INJECTION, SOLUTION INTRAVENOUS; SUBCUTANEOUS at 06:05

## 2022-09-30 ASSESSMENT — PAIN DESCRIPTION - PAIN TYPE
TYPE: ACUTE PAIN;CHRONIC PAIN
TYPE: ACUTE PAIN

## 2022-09-30 ASSESSMENT — ENCOUNTER SYMPTOMS
DIARRHEA: 0
WEAKNESS: 1
ABDOMINAL PAIN: 0
CHILLS: 0
HEADACHES: 0

## 2022-09-30 NOTE — PROGRESS NOTES
Hemodialysis done today for total of 1 hour and 21 mins.started @ 0911 and ended @ 1224 with net UF= 7 ml. Noted HD catheter malfunction during the 1st 30 mins of HD, Blood returned, Dr Verdin notified with order to instill TPA on both ports to dwell for 45 mins,suggested that i'm suspecting that  the patient might be having adverse reaction to heparin with order to try HD without heparin. Replaced with new cartridge then tx restarted. Same catheter malfunction encountered about 30 intra HD, line switched, flushed with NS multiple times, still with same catheter problem noted. Blood returned without problem. Dr Verdin notified with order to stop HD for today and instill TPA 2 mg  both ports per designated amount to dwell overnight and  do 2 hours HD in AM, primary RN ZULEIMA Parra notified.

## 2022-09-30 NOTE — PROGRESS NOTES
"Hospital Medicine Daily Progress Note    Date of Service  9/30/2022    Chief Complaint  Germaine Chaparro is a 77 y.o. female admitted 9/26/2022 with ongoing weakness    Hospital Course  As per admitting Hospitalist Dr. Rivera:   \"Germaine Chaparro is a 77 y.o. female who presented 9/26/2022 with weakness. She has h/o HFpEF, DM, HTN, pulm HTN, DOROTHY who was recently started on dialysis 9/11 at [facility] for renal failure. She was discharged home and patient felt she was discharged too early. She says she's been weak. She perks up after dialysis but on Saturday she felt very weak and slid off a chair where her son found her. She doesn't remember falling, may have passed out. She has had issues with hypoglycemia after hospitalization.  She went to [facility] and found with mild hypoMg 1.6 and hypophos 1.9 that was repleted orally. It was discussed with Dr. Verdin who said she might need long-term placement. She was transferred because they do not have dialysis. Family had requested [facility] on transfer.  Currently she feels weak and \"spacey.\" Denies CP, SOB, dizziness, abd pain, nausea. She says her edema is improved from her baseline.\"    Interval Problem Update  Patient stated she feels better less loose stools. Stool analysis pending results.  HD today, had to be stopped due to catheter malfunction. TPA given in catheter.  She stated she is walking a bit more.    [facility] cant accept until next week. Declined by other SNFs.    I have discussed this patient's plan of care and discharge plan at IDT rounds today with Case Management, Nursing, Nursing leadership, and other members of the IDT team.    Consultants/Specialty  nephrology    Code Status  DNAR/DNI    Disposition  Patient is not medically cleared for discharge.   Anticipate discharge to to skilled nursing facility.  I have placed the appropriate orders for post-discharge needs.    Review of Systems  Review of Systems   Constitutional:  Negative for chills " and malaise/fatigue.   Gastrointestinal:  Negative for abdominal pain and diarrhea.   Neurological:  Positive for weakness. Negative for headaches.   All other systems reviewed and are negative.     Physical Exam  Temp:  [36.5 °C (97.7 °F)-36.6 °C (97.9 °F)] 36.6 °C (97.9 °F)  Pulse:  [60-74] 64  Resp:  [16-18] 17  BP: (119-136)/(42-52) 119/46  SpO2:  [93 %-96 %] 95 %    Physical Exam  Vitals and nursing note reviewed.   Constitutional:       General: She is not in acute distress.     Appearance: She is obese. She is not ill-appearing.      Comments: Frail appearing elderly female   Cardiovascular:      Rate and Rhythm: Normal rate and regular rhythm.      Pulses: Normal pulses.      Heart sounds: Normal heart sounds. No murmur heard.  Pulmonary:      Effort: Pulmonary effort is normal.      Breath sounds: Normal breath sounds.   Abdominal:      General: Abdomen is flat. Bowel sounds are normal.      Palpations: Abdomen is soft.   Musculoskeletal:         General: No swelling or tenderness.      Comments: Sarcopenic. Bilateral hand muscle atrophy noted.   Skin:     Coloration: Skin is not jaundiced.      Findings: No erythema.   Neurological:      Mental Status: She is alert and oriented to person, place, and time. Mental status is at baseline.      Motor: Weakness present.   Psychiatric:         Mood and Affect: Mood normal.         Behavior: Behavior normal.       Fluids    Intake/Output Summary (Last 24 hours) at 9/30/2022 1510  Last data filed at 9/30/2022 1323  Gross per 24 hour   Intake 1500 ml   Output 2082 ml   Net -582 ml       Laboratory  Recent Labs     09/28/22 0411 09/30/22 0458   WBC 8.8 7.6   RBC 2.71* 2.64*   HEMOGLOBIN 8.5* 8.4*   HEMATOCRIT 27.2* 27.4*   .4* 103.8*   MCH 31.4 31.8   MCHC 31.3* 30.7*   RDW 46.8 49.6   PLATELETCT 208 196   MPV 11.9 11.5     Recent Labs     09/28/22 0411 09/30/22 0458   SODIUM 140 143   POTASSIUM 4.3 4.1   CHLORIDE 101 103   CO2 28 27   GLUCOSE 140* 132*    BUN 27* 31*   CREATININE 4.92* 5.36*   CALCIUM 8.5 8.7                   Imaging  EC-ECHOCARDIOGRAM COMPLETE W/O CONT   Final Result      OUTSIDE IMAGES-CT HEAD   Final Result           Assessment/Plan  * Debility- (present on admission)  Assessment & Plan  Weakness after hospital stay and starting dialysis  Has macrocytic anemia  Pending [facility] sn[facility]  Feels better after IM B12    End stage renal failure on dialysis (HCC)  Assessment & Plan  HD MWF  consulted Dr. Verdin on admission  Will need outpatient HD. [facility] needs T[facility]TH[facility]S schedule at [facility].    Unable to complete HD today, catheter infiltrated, needs TPA overnight as per nephrology.    Functional diarrhea- (present on admission)  Assessment & Plan  Acute on chronic  Checking stool studies  Labs does not appear to be c.diff    DOROTHY (obstructive sleep apnea)- (present on admission)  Assessment & Plan  CPAP nocturnal    Pulmonary HTN (HCC)- (present on admission)  Assessment & Plan  2015 RVSP 65mmHg  New echo 40mmHg, improved  Cont oral lasix    Breast cancer (HCC)  Assessment & Plan  Had resection 2021 and on tamoxifen, continue medication    Anemia- (present on admission)  Assessment & Plan  Macrocytic anemia. Pt denied GIB symptoms.  Hgb 8.2, Hct 26, .8.   Ferritin 509.  Iron sat 23%.  B12 levels 227, low.  Gave IM. Continue PO.    Hypothyroid- (present on admission)  Assessment & Plan  Cont synthroid    Type 2 diabetes mellitus with hyperglycemia (HCC)- (present on admission)  Assessment & Plan  ISS ordered    Congestive heart failure (HCC)- (present on admission)  Assessment & Plan  Chronic (HFpEF) diastolic dysfunction  No recent TTE. Given possible syncope on Saturday    New echo LVEF 65%, RVSP 40mmHg, grade 2 diastolic dysfunction  Continue coreg, norvasc and lasix    HTN (hypertension)- (present on admission)  Assessment & Plan  Cont coreg, norvasc and lasix     VTE prophylaxis: heparin ppx    I have performed a physical exam and reviewed and  updated ROS and Plan today (9/30/2022). In review of yesterday's note (9/29/2022), there are no changes except as documented above.

## 2022-09-30 NOTE — PROGRESS NOTES
Received report from day shift RN. Assumed care of pt. Pt A&O X 4, on 1L NC. Pt denies any pain at this time.. No signs of respiratory distress, VSS. Pt encouraged with IS use while awake, updated with POC, fall precautions in place, call light within reach. All needs met at this time.

## 2022-09-30 NOTE — CARE PLAN
The patient is Stable - Low risk of patient condition declining or worsening    Shift Goals  Clinical Goals: pt will have adequate insulin coverage per sliding scale  Patient Goals: pt will sleep and rest at least 5 hours tonight    Progress made toward(s) clinical / shift goals:  Pt required 1 unit reg insulin at bedtime, non required in the AM, ; encouraged to use IS while awake to eventually wean off O2; progressing in other goals.    Patient is not progressing towards the following goals: NA

## 2022-09-30 NOTE — CARE PLAN
The patient is Stable - Low risk of patient condition declining or worsening    Shift Goals  Clinical Goals: Patient will tolerate dialysis session today  Patient Goals: Patient will ambulate and find notary for weekend    Progress made toward(s) clinical / shift goals:  Patient was able to ambulate in the gray with assistance from RAMONITA Gleason and has since been up in the chair. Patient tolerates well. Patient received a list of mobile notaries to contact for her paperwork needs.     Patient is not progressing towards the following goals: Patient was unable to receive dialysis d/t malfunctions with her central line. DENNIS Cruz from [facility] administered alteplase per nephrologists order. Alteplase is to sit in line until he returns tomorrow to attempt dialysis session again.

## 2022-09-30 NOTE — PROGRESS NOTES
"[facility] -  PROGRESS NOTE               Author: Cory Verdin M.D. Date & Time: 9/30/2022  9:02 AM     HPI:  Germaine Chaparro is a 77 y.o. female who presented 9/26/2022 with weakness. She has h/o HFpEF, DM, HTN, pulm HTN, DOROTHY who was recently started on dialysis 9/11 at [facility].  Since discharge she's been weak.  She slid off a chair where her son found her.  Has had issues with hypoglycemia after hospitalization.  She was at [facility] and found with mild hypoMg 1.6 and hypophos 1.9 that was repleted.  Now here for possible placement.      DAILY NEPHROLOGY SUMMARY:  9/28 - No complaints of pain or SOB.  Still feeling fatigued, having diarrhea  9/29 - Feeling more energy today.  Tolerated HD yesterday.  9/30 - Denies pain or SOB.  Seen on HD.    REVIEW OF SYSTEMS:    10 point ROS reviewed and is as per HPI/daily summary or otherwise negative    PMH/PSH/SH/FH:   Reviewed and unchanged since admission note    CURRENT MEDICATIONS:   Reviewed from admission to present day    VS:  /52   Pulse 62   Temp 36.5 °C (97.7 °F) (Oral)   Resp 17   Ht 1.676 m (5' 6\")   Wt 115 kg (253 lb 1.4 oz)   SpO2 96%   BMI 40.85 kg/m²     Physical Exam  HENT:      Right Ear: External ear normal.      Left Ear: External ear normal.      Mouth/Throat:      Mouth: Mucous membranes are moist.   Eyes:      General: No scleral icterus.  Cardiovascular:      Rate and Rhythm: Normal rate.   Pulmonary:      Effort: Pulmonary effort is normal.   Abdominal:      Palpations: Abdomen is soft.   Musculoskeletal:      Cervical back: Normal range of motion.      Right lower leg: Edema present.      Left lower leg: Edema present.   Skin:     General: Skin is warm.   Neurological:      General: No focal deficit present.      Mental Status: She is alert.   Psychiatric:         Mood and Affect: Mood normal.      Fluids:  In: 600 [P.O.:600]  Out: 1575     LABS:  Recent Labs     09/28/22  0411 09/30/22  0458 "   SODIUM 140 143   POTASSIUM 4.3 4.1   CHLORIDE 101 103   CO2 28 27   GLUCOSE 140* 132*   BUN 27* 31*   CREATININE 4.92* 5.36*   CALCIUM 8.5 8.7         IMAGING:   All imaging reviewed from admission to present day    IMPRESSION:  # JEANINE vs ESRD    - Now dialysis a dependent    - Will continue dialysis qMWF  # Weakness    - Management per primary team  # DOROTHY  # Pulmonary HTN  # Breast Cancer  # HTN  - Goal BP < 140/90  - At goal  # Anemia of CKD  - Goal Hgb 10-11  - Epo with HD  # CKD-MBD  - PTH pen  - Vit D pen  - Ca 8.5  - PO4 2.5  # Dm  # Hypothyroidism     PLAN:  - HD today and cont HD qMWF  - Addendum only finished 1.5 hrs of HD today, catheter malfunction, tPA dwell overnight  - Epo with HD  - Do not exceed gabapentin 300mg daily in ESRD patient  - UF as tolerated  - No dietary protein or phos restrictions  - Dose all meds per ESRD  - Rest of management per primary team    Okay to manage outpatient from renal standpoint when medically appropriate

## 2022-09-30 NOTE — DISCHARGE PLANNING
Case Management Discharge Planning    Admission Date: 9/26/2022  GMLOS: 3.8  ALOS: 4    6-Clicks ADL Score: 15  6-Clicks Mobility Score: 20  PT and/or OT Eval ordered: Yes  Post-acute Referrals Ordered: Yes  Post-acute Choice Obtained: Yes  Has referral(s) been sent to post-acute provider:  Yes      Anticipated Discharge Dispo: Discharge Disposition: D/T to SNF with Medicare cert in anticipation of skilled care (03)    DME Needed: No    Action(s) Taken: Spoke to Madison from [facility]. Per Madison, they can accommodate dialysis at [facility], [facility], or [address] [place] [address] locations. Madison also stated they can accept pt as soon as dialysis is set up.    RNCM spoke to dialysis coordinator Zohreh. Zohreh to work on dialysis placement and update RNCM.    Received call from Zohreh. Per Zohreh, pt already has chair at [facility] MWF at 1045.   RNCM called [facility] to inform Madison. Per Madison, [facility] cannot accommodate dialysis at [facility] because it is too far for their  to drive.   RNCM informed Zohreh.    Escalations Completed: None    Medically Clear: Yes    Next Steps: f/u with Zohreh regarding dialysis chair    Barriers to Discharge: Dialysis

## 2022-10-01 LAB
GLUCOSE BLD STRIP.AUTO-MCNC: 135 MG/DL (ref 65–99)
GLUCOSE BLD STRIP.AUTO-MCNC: 136 MG/DL (ref 65–99)
GLUCOSE BLD STRIP.AUTO-MCNC: 220 MG/DL (ref 65–99)
GLUCOSE BLD STRIP.AUTO-MCNC: 238 MG/DL (ref 65–99)
GLUCOSE BLD STRIP.AUTO-MCNC: 255 MG/DL (ref 65–99)

## 2022-10-01 PROCEDURE — 770006 HCHG ROOM/CARE - MED/SURG/GYN SEMI*

## 2022-10-01 PROCEDURE — 94660 CPAP INITIATION&MGMT: CPT

## 2022-10-01 PROCEDURE — 99232 SBSQ HOSP IP/OBS MODERATE 35: CPT | Performed by: INTERNAL MEDICINE

## 2022-10-01 PROCEDURE — A9270 NON-COVERED ITEM OR SERVICE: HCPCS | Performed by: HOSPITALIST

## 2022-10-01 PROCEDURE — A9270 NON-COVERED ITEM OR SERVICE: HCPCS | Performed by: INTERNAL MEDICINE

## 2022-10-01 PROCEDURE — 700102 HCHG RX REV CODE 250 W/ 637 OVERRIDE(OP): Performed by: HOSPITALIST

## 2022-10-01 PROCEDURE — 90935 HEMODIALYSIS ONE EVALUATION: CPT

## 2022-10-01 PROCEDURE — 700111 HCHG RX REV CODE 636 W/ 250 OVERRIDE (IP): Performed by: STUDENT IN AN ORGANIZED HEALTH CARE EDUCATION/TRAINING PROGRAM

## 2022-10-01 PROCEDURE — 82962 GLUCOSE BLOOD TEST: CPT | Mod: 91

## 2022-10-01 PROCEDURE — 700102 HCHG RX REV CODE 250 W/ 637 OVERRIDE(OP): Performed by: INTERNAL MEDICINE

## 2022-10-01 PROCEDURE — 94760 N-INVAS EAR/PLS OXIMETRY 1: CPT

## 2022-10-01 RX ORDER — MIDODRINE HYDROCHLORIDE 5 MG/1
5 TABLET ORAL ONCE
Status: COMPLETED | OUTPATIENT
Start: 2022-10-01 | End: 2022-10-01

## 2022-10-01 RX ORDER — HEPARIN SODIUM 1000 [USP'U]/ML
3000 INJECTION, SOLUTION INTRAVENOUS; SUBCUTANEOUS ONCE
Status: COMPLETED | OUTPATIENT
Start: 2022-10-01 | End: 2022-10-01

## 2022-10-01 RX ADMIN — MIDODRINE HYDROCHLORIDE 5 MG: 5 TABLET ORAL at 19:08

## 2022-10-01 RX ADMIN — CARVEDILOL 12.5 MG: 6.25 TABLET, FILM COATED ORAL at 17:11

## 2022-10-01 RX ADMIN — GABAPENTIN 300 MG: 300 CAPSULE ORAL at 05:33

## 2022-10-01 RX ADMIN — AMITRIPTYLINE HYDROCHLORIDE 10 MG: 10 TABLET, FILM COATED ORAL at 05:33

## 2022-10-01 RX ADMIN — FUROSEMIDE 80 MG: 40 TABLET ORAL at 05:33

## 2022-10-01 RX ADMIN — CYANOCOBALAMIN TAB 500 MCG 1000 MCG: 500 TAB at 05:32

## 2022-10-01 RX ADMIN — CARVEDILOL 12.5 MG: 6.25 TABLET, FILM COATED ORAL at 09:28

## 2022-10-01 RX ADMIN — MAGNESIUM GLUCONATE 500 MG ORAL TABLET 400 MG: 500 TABLET ORAL at 05:33

## 2022-10-01 RX ADMIN — AMLODIPINE BESYLATE 5 MG: 5 TABLET ORAL at 05:32

## 2022-10-01 RX ADMIN — TAMOXIFEN CITRATE 20 MG: 10 TABLET ORAL at 05:32

## 2022-10-01 RX ADMIN — AMITRIPTYLINE HYDROCHLORIDE 10 MG: 10 TABLET, FILM COATED ORAL at 17:11

## 2022-10-01 RX ADMIN — MAGNESIUM GLUCONATE 500 MG ORAL TABLET 400 MG: 500 TABLET ORAL at 17:11

## 2022-10-01 RX ADMIN — HEPARIN SODIUM 3000 UNITS: 1000 INJECTION, SOLUTION INTRAVENOUS; SUBCUTANEOUS at 06:30

## 2022-10-01 RX ADMIN — THIAMINE HCL TAB 100 MG 100 MG: 100 TAB at 05:33

## 2022-10-01 RX ADMIN — LEVOTHYROXINE SODIUM 100 MCG: 0.1 TABLET ORAL at 05:32

## 2022-10-01 RX ADMIN — HEPARIN SODIUM 3300 UNITS: 1000 INJECTION, SOLUTION INTRAVENOUS; SUBCUTANEOUS at 08:30

## 2022-10-01 ASSESSMENT — PAIN DESCRIPTION - PAIN TYPE
TYPE: ACUTE PAIN
TYPE: ACUTE PAIN

## 2022-10-01 ASSESSMENT — ENCOUNTER SYMPTOMS
ABDOMINAL PAIN: 0
DIARRHEA: 0
HEADACHES: 0
CHILLS: 0
WEAKNESS: 0

## 2022-10-01 NOTE — PROGRESS NOTES
"Hospital Medicine Daily Progress Note    Date of Service  10/1/2022    Chief Complaint  Germaine Chaparro is a 77 y.o. female admitted 9/26/2022 with ongoing weakness    Hospital Course  As per admitting Hospitalist Dr. Rivera:   \"Germaine Chaparro is a 77 y.o. female who presented 9/26/2022 with weakness. She has h/o HFpEF, DM, HTN, pulm HTN, DOROTHY who was recently started on dialysis 9/11 at [facility] for renal failure. She was discharged home and patient felt she was discharged too early. She says she's been weak. She perks up after dialysis but on Saturday she felt very weak and slid off a chair where her son found her. She doesn't remember falling, may have passed out. She has had issues with hypoglycemia after hospitalization.  She went to [facility] and found with mild hypoMg 1.6 and hypophos 1.9 that was repleted orally. It was discussed with Dr. Verdin who said she might need long-term placement. She was transferred because they do not have dialysis. Family had requested [facility] on transfer.  Currently she feels weak and \"spacey.\" Denies CP, SOB, dizziness, abd pain, nausea. She says her edema is improved from her baseline.\"    Interval Problem Update  Patient stated she feels better, no complaint of loose stools. No acute complaints.  HD to re-attempt today, TPA in catheter overnight. HD RN at bedside during eval.    [facility] cant accept until next week. Declined by other SNFs.    I have discussed this patient's plan of care and discharge plan at IDT rounds today with Case Management, Nursing, Nursing leadership, and other members of the IDT team.    Consultants/Specialty  nephrology    Code Status  DNAR/DNI    Disposition  Patient is not medically cleared for discharge.   Anticipate discharge to to skilled nursing facility.  I have placed the appropriate orders for post-discharge needs.    Review of Systems  Review of Systems   Constitutional:  Negative for chills and malaise/fatigue. "   Gastrointestinal:  Negative for abdominal pain and diarrhea.   Neurological:  Negative for weakness and headaches.   All other systems reviewed and are negative.     Physical Exam  Temp:  [36.6 °C (97.9 °F)-37.7 °C (99.9 °F)] 36.7 °C (98 °F)  Pulse:  [60-75] 75  Resp:  [16-20] 20  BP: (119-149)/() 126/43  SpO2:  [93 %-98 %] 94 %    Physical Exam  Vitals and nursing note reviewed.   Constitutional:       General: She is not in acute distress.     Appearance: She is obese.      Comments: Frail appearing elderly female   Cardiovascular:      Rate and Rhythm: Normal rate and regular rhythm.      Pulses: Normal pulses.      Heart sounds: Normal heart sounds. No murmur heard.  Pulmonary:      Effort: Pulmonary effort is normal. No respiratory distress.      Breath sounds: Normal breath sounds.   Abdominal:      General: Bowel sounds are normal. There is no distension.      Tenderness: There is no abdominal tenderness.   Musculoskeletal:         General: No tenderness.      Right lower leg: No edema.      Left lower leg: No edema.      Comments: Sarcopenic. Bilateral hand muscle atrophy noted.   Skin:     Capillary Refill: Capillary refill takes less than 2 seconds.      Coloration: Skin is not jaundiced.      Findings: No erythema.   Neurological:      Mental Status: She is alert and oriented to person, place, and time. Mental status is at baseline.      Motor: Weakness present.   Psychiatric:         Mood and Affect: Mood normal.         Behavior: Behavior normal.       Fluids    Intake/Output Summary (Last 24 hours) at 10/1/2022 0612  Last data filed at 9/30/2022 1748  Gross per 24 hour   Intake 1740 ml   Output 2007 ml   Net -267 ml       Laboratory  Recent Labs     09/30/22  0458   WBC 7.6   RBC 2.64*   HEMOGLOBIN 8.4*   HEMATOCRIT 27.4*   .8*   MCH 31.8   MCHC 30.7*   RDW 49.6   PLATELETCT 196   MPV 11.5     Recent Labs     09/30/22 0458   SODIUM 143   POTASSIUM 4.1   CHLORIDE 103   CO2 27   GLUCOSE  132*   BUN 31*   CREATININE 5.36*   CALCIUM 8.7                   Imaging  EC-ECHOCARDIOGRAM COMPLETE W/O CONT   Final Result      OUTSIDE IMAGES-CT HEAD   Final Result           Assessment/Plan  * Debility- (present on admission)  Assessment & Plan  Weakness after hospital stay and starting dialysis  Has macrocytic anemia  Pending [facility] snf  Feels better after IM B12  More ambulatory with walker    End stage renal failure on dialysis (HCC)  Assessment & Plan  HD MWF  consulted Dr. Verdin on admission  Will need outpatient HD. [facility].    HD to re-attempt today, TPA in catheter overnight. HD RN at bedside during eval.    Functional diarrhea- (present on admission)  Assessment & Plan  Acute on chronic  Checking stool studies  Labs does not appear to be c.diff    DOROTHY (obstructive sleep apnea)- (present on admission)  Assessment & Plan  CPAP nocturnal    Pulmonary HTN (HCC)- (present on admission)  Assessment & Plan  2015 RVSP 65mmHg  New echo 40mmHg, improved  Cont oral lasix    Breast cancer (HCC)  Assessment & Plan  Had resection 2021 and on tamoxifen, continue medication    Anemia- (present on admission)  Assessment & Plan  Macrocytic anemia, due to ESRD and B12 deficiency.  Pt denied GIB symptoms.  Ferritin 509.  Iron sat 23%.  B12 levels 227, low.  Gave IM. Continue PO.    Hypothyroid- (present on admission)  Assessment & Plan  Cont synthroid    Type 2 diabetes mellitus with hyperglycemia (HCC)- (present on admission)  Assessment & Plan  ISS ordered  Gabapentin 300mg max daily    Congestive heart failure (HCC)- (present on admission)  Assessment & Plan  Chronic (HFpEF) diastolic dysfunction  No recent TTE. Given possible syncope on Saturday    New echo LVEF 65%, RVSP 40mmHg, grade 2 diastolic dysfunction  Continue coreg, norvasc and lasix    HTN (hypertension)- (present on admission)  Assessment & Plan  Cont coreg, norvasc and lasix     VTE prophylaxis: heparin ppx    I have  performed a physical exam and reviewed and updated ROS and Plan today (10/1/2022). In review of yesterday's note (9/30/2022), there are no changes except as documented above.

## 2022-10-01 NOTE — PROGRESS NOTES
"[facility] -  PROGRESS NOTE               Author: Marii Arteaga M.D. Date & Time: 10/1/2022  11:00 AM     HPI:  Germaine Chaparro is a 77 y.o. female who presented 9/26/2022 with weakness. She has h/o HFpEF, DM, HTN, pulm HTN, DOROTHY who was recently started on dialysis 9/11 at [facility].  Since discharge she's been weak.  She slid off a chair where her son found her.  Has had issues with hypoglycemia after hospitalization.  She was at [facility] and found with mild hypoMg 1.6 and hypophos 1.9 that was repleted.  Now here for possible placement.      DAILY NEPHROLOGY SUMMARY:  See note from 9/30 for prior summaries  10/01: NAEO, no complaints, feels good, HD well this AM with nml flow in catheter    REVIEW OF SYSTEMS:    10 point ROS reviewed and is as per HPI/daily summary or otherwise negative    PMH/PSH/SH/FH:   Reviewed and unchanged since admission note    CURRENT MEDICATIONS:   Reviewed from admission to present day    VS:  /57   Pulse 67   Temp 36.7 °C (98 °F) (Oral)   Resp 20   Ht 1.676 m (5' 6\")   Wt 115 kg (253 lb 1.4 oz)   SpO2 94%   BMI 40.85 kg/m²     Physical Exam  Nursing note reviewed.   Constitutional:       Appearance: Normal appearance.   Eyes:      General: No scleral icterus.  Cardiovascular:      Comments: BLE edema  Pulmonary:      Effort: Pulmonary effort is normal.   Abdominal:      General: There is no distension.   Musculoskeletal:         General: No deformity.   Skin:     Findings: No rash.   Neurological:      Mental Status: She is alert.   Psychiatric:         Behavior: Behavior normal.      Fluids:  In: 1740 [P.O.:840; Dialysis:900]  Out: 2007     LABS:  Recent Labs     09/30/22  0458   SODIUM 143   POTASSIUM 4.1   CHLORIDE 103   CO2 27   GLUCOSE 132*   BUN 31*   CREATININE 5.36*   CALCIUM 8.7       IMAGING:   All imaging reviewed from admission to present day    IMPRESSION:  # JEANINE vs ESRD    - Now dialysis a dependent  # Weakness    - " Management per primary team  # DOROTHY  # Pulmonary HTN  # Breast Cancer  # HTN  - Goal BP < 140/90  - At goal  # Anemia of CKD  - Goal Hgb 10-11  - Epo with HD  # CKD-MBD  - PTH pen  - Vit D pen  - Ca 8.5  - PO4 2.5  # type 2 DM  # Hypothyroidism     PLAN:  - MWF iHD  - UF as tolerated  - Epo with HD  - Do not exceed gabapentin 300mg daily in ESRD patient  - UF as tolerated  - No dietary protein or phos restrictions  - Dose all meds per ESRD  - Awaiting transfer to [facility] next week likely

## 2022-10-01 NOTE — PROGRESS NOTES
Received bedside patient report from DENNIS Tolbert. Patient resting comfortably in bed, no complaints at this time. Safety precautions in place. Will continue to monitor.

## 2022-10-01 NOTE — PROGRESS NOTES
Received report from NOC RN. Pt is A&Ox4. VSS. Pt has no current complaints of pain at this time. Dialysis at bedside. Updated pt on POC continue dialysis and ambulation throughout the shift. Safety precautions in place.

## 2022-10-01 NOTE — CARE PLAN
The patient is Stable - Low risk of patient condition declining or worsening    Shift Goals  Clinical Goals: Free from falls or injuries, rest and sleep for at least 4 hours  Patient Goals: Free from falls or injuries, rest and sleep for at least 4 hours    Progress made toward(s) clinical / shift goals: No falls or injuries, slept for at least 4 hours    Patient is not progressing towards the following goals:

## 2022-10-01 NOTE — PROGRESS NOTES
Extra HD treatment today to check effectiveness of overnight dwell of TPA  which worked better on the blue port compared to the red port,noted to have resistance so CVC run  reversed at 350 ml BFR.Treatment tolerated well with no issues of clotting with the increase dosage of heparin .VSS throughout.Net UF removed 1500 ml.CVC dressing changed and locked with heparin.  Report given to primary Rn.

## 2022-10-01 NOTE — CARE PLAN
The patient is Stable - Low risk of patient condition declining or worsening    Shift Goals  Clinical Goals: pt will ambulate with nursing staff throughout shift  Patient Goals: fall prevention    Progress made toward(s) clinical / shift goals:  pt has ambulated with pt this shift, progressing on other goals     Patient is not progressing towards the following goals:

## 2022-10-02 LAB
GLUCOSE BLD STRIP.AUTO-MCNC: 134 MG/DL (ref 65–99)
GLUCOSE BLD STRIP.AUTO-MCNC: 167 MG/DL (ref 65–99)
GLUCOSE BLD STRIP.AUTO-MCNC: 231 MG/DL (ref 65–99)

## 2022-10-02 PROCEDURE — 700102 HCHG RX REV CODE 250 W/ 637 OVERRIDE(OP): Performed by: INTERNAL MEDICINE

## 2022-10-02 PROCEDURE — 99232 SBSQ HOSP IP/OBS MODERATE 35: CPT | Performed by: INTERNAL MEDICINE

## 2022-10-02 PROCEDURE — 82962 GLUCOSE BLOOD TEST: CPT | Mod: 91

## 2022-10-02 PROCEDURE — 770006 HCHG ROOM/CARE - MED/SURG/GYN SEMI*

## 2022-10-02 PROCEDURE — A9270 NON-COVERED ITEM OR SERVICE: HCPCS | Performed by: INTERNAL MEDICINE

## 2022-10-02 PROCEDURE — 94660 CPAP INITIATION&MGMT: CPT

## 2022-10-02 PROCEDURE — 94760 N-INVAS EAR/PLS OXIMETRY 1: CPT

## 2022-10-02 PROCEDURE — 700111 HCHG RX REV CODE 636 W/ 250 OVERRIDE (IP): Performed by: INTERNAL MEDICINE

## 2022-10-02 RX ADMIN — FUROSEMIDE 80 MG: 40 TABLET ORAL at 05:19

## 2022-10-02 RX ADMIN — THIAMINE HCL TAB 100 MG 100 MG: 100 TAB at 05:17

## 2022-10-02 RX ADMIN — AMITRIPTYLINE HYDROCHLORIDE 10 MG: 10 TABLET, FILM COATED ORAL at 05:19

## 2022-10-02 RX ADMIN — CYANOCOBALAMIN TAB 500 MCG 1000 MCG: 500 TAB at 05:21

## 2022-10-02 RX ADMIN — AMITRIPTYLINE HYDROCHLORIDE 10 MG: 10 TABLET, FILM COATED ORAL at 17:07

## 2022-10-02 RX ADMIN — CARVEDILOL 12.5 MG: 6.25 TABLET, FILM COATED ORAL at 07:49

## 2022-10-02 RX ADMIN — CARVEDILOL 12.5 MG: 6.25 TABLET, FILM COATED ORAL at 17:07

## 2022-10-02 RX ADMIN — TAMOXIFEN CITRATE 20 MG: 10 TABLET ORAL at 05:19

## 2022-10-02 RX ADMIN — AMLODIPINE BESYLATE 5 MG: 5 TABLET ORAL at 05:19

## 2022-10-02 RX ADMIN — GABAPENTIN 300 MG: 300 CAPSULE ORAL at 05:20

## 2022-10-02 RX ADMIN — LEVOTHYROXINE SODIUM 100 MCG: 0.1 TABLET ORAL at 05:19

## 2022-10-02 RX ADMIN — HEPARIN SODIUM 5000 UNITS: 5000 INJECTION, SOLUTION INTRAVENOUS; SUBCUTANEOUS at 14:05

## 2022-10-02 RX ADMIN — MAGNESIUM GLUCONATE 500 MG ORAL TABLET 400 MG: 500 TABLET ORAL at 17:07

## 2022-10-02 RX ADMIN — MAGNESIUM GLUCONATE 500 MG ORAL TABLET 400 MG: 500 TABLET ORAL at 05:19

## 2022-10-02 ASSESSMENT — ENCOUNTER SYMPTOMS
WEAKNESS: 0
DIARRHEA: 0
ABDOMINAL PAIN: 0
HEADACHES: 0
CHILLS: 0

## 2022-10-02 ASSESSMENT — PAIN DESCRIPTION - PAIN TYPE
TYPE: ACUTE PAIN
TYPE: ACUTE PAIN;CHRONIC PAIN

## 2022-10-02 NOTE — PROGRESS NOTES
Received report from NOC RN. Pt is A&Ox4. VSS. Pt has no current complaints of pain at this time. Updated pt on POC for ambulation and to continue to monitor blood pressure. Safety precautions in place.

## 2022-10-02 NOTE — PROGRESS NOTES
Notified MD of pt complaining of dizziness. Pt BP 92/37 at 1620, most recent 87/41. Pt stated symptoms improving but still hypotensive. New orders received.

## 2022-10-02 NOTE — CARE PLAN
The patient is Stable - Low risk of patient condition declining or worsening    Shift Goals  Clinical Goals: Pt will ambulate with nursing staff throughout shift  Patient Goals: comfort, rest    Progress made toward(s) clinical / shift goals:  pt ambulated with nursing staff to cardiac chair this shift, progressing on other goals     Patient is not progressing towards the following goals:

## 2022-10-02 NOTE — PROGRESS NOTES
"Hospital Medicine Daily Progress Note    Date of Service  10/2/2022    Chief Complaint  Germaine Chaparro is a 77 y.o. female admitted 9/26/2022 with ongoing weakness    Hospital Course  As per admitting Hospitalist Dr. Rivera:   \"Germaine Chaparro is a 77 y.o. female who presented 9/26/2022 with weakness. She has h/o HFpEF, DM, HTN, pulm HTN, DOROTHY who was recently started on dialysis 9/11 at [facility] for renal failure. She was discharged home and patient felt she was discharged too early. She says she's been weak. She perks up after dialysis but on Saturday she felt very weak and slid off a chair where her son found her. She doesn't remember falling, may have passed out. She has had issues with hypoglycemia after hospitalization.  She went to [facility] and found with mild hypoMg 1.6 and hypophos 1.9 that was repleted orally. It was discussed with Dr. Verdin who said she might need long-term placement. She was transferred because they do not have dialysis. Family had requested [facility] on transfer.  Currently she feels weak and \"spacey.\" Denies CP, SOB, dizziness, abd pain, nausea. She says her edema is improved from her baseline.\"    Interval Problem Update  Patient had no acute complaints. Son at bedside, answered all questions.  HD yesterday able to remove 1.5L, no further catheter problems.    I have discussed this patient's plan of care and discharge plan at IDT rounds today with Case Management, Nursing, Nursing leadership, and other members of the IDT team.    Consultants/Specialty  nephrology    Code Status  DNAR/DNI    Disposition  Patient is medically cleared pending HD chair approval  for discharge.   Anticipate discharge to to skilled nursing facility [facility]  I have placed the appropriate orders for post-discharge needs.    Review of Systems  Review of Systems   Constitutional:  Negative for chills and malaise/fatigue.   Gastrointestinal:  Negative for abdominal pain and diarrhea.   Neurological:  " Negative for weakness and headaches.   All other systems reviewed and are negative.     Physical Exam  Temp:  [36.7 °C (98 °F)-37.4 °C (99.3 °F)] 36.9 °C (98.4 °F)  Pulse:  [60-70] 70  Resp:  [16-20] 18  BP: ()/(37-54) 115/40  SpO2:  [91 %-96 %] 92 %    Physical Exam  Vitals and nursing note reviewed.   Constitutional:       General: She is not in acute distress.     Appearance: She is obese.      Comments: Frail appearing elderly female   Cardiovascular:      Rate and Rhythm: Normal rate and regular rhythm.      Pulses: Normal pulses.      Heart sounds: Normal heart sounds. No murmur heard.  Pulmonary:      Effort: Pulmonary effort is normal. No respiratory distress.      Breath sounds: Normal breath sounds.   Abdominal:      General: Bowel sounds are normal. There is no distension.      Tenderness: There is no abdominal tenderness.   Musculoskeletal:         General: No tenderness.      Right lower leg: No edema.      Left lower leg: No edema.      Comments: Sarcopenic. Bilateral hand muscle atrophy noted.   Skin:     Capillary Refill: Capillary refill takes less than 2 seconds.      Coloration: Skin is not jaundiced.      Findings: No erythema.   Neurological:      Mental Status: She is alert and oriented to person, place, and time. Mental status is at baseline.      Motor: Weakness present.   Psychiatric:         Mood and Affect: Mood normal.         Behavior: Behavior normal.       Fluids    Intake/Output Summary (Last 24 hours) at 10/2/2022 1313  Last data filed at 10/2/2022 0900  Gross per 24 hour   Intake 720 ml   Output 350 ml   Net 370 ml       Laboratory  Recent Labs     09/30/22  0458   WBC 7.6   RBC 2.64*   HEMOGLOBIN 8.4*   HEMATOCRIT 27.4*   .8*   MCH 31.8   MCHC 30.7*   RDW 49.6   PLATELETCT 196   MPV 11.5     Recent Labs     09/30/22  0458   SODIUM 143   POTASSIUM 4.1   CHLORIDE 103   CO2 27   GLUCOSE 132*   BUN 31*   CREATININE 5.36*   CALCIUM 8.7                    Imaging  EC-ECHOCARDIOGRAM COMPLETE W/O CONT   Final Result      OUTSIDE IMAGES-CT HEAD   Final Result           Assessment/Plan  * Debility- (present on admission)  Assessment & Plan  Weakness after hospital stay and starting dialysis  Has macrocytic anemia  Pending [facility] snf  Feels better after IM B12  More ambulatory with walker    End stage renal failure on dialysis (HCC)- (present on admission)  Assessment & Plan  HD MWF  consulted Dr. Verdin on admission  Will need outpatient HD. [facility] needs T/TH/S schedule at [facility].    HD yesterday able to remove 1.5L, no further catheter problems.    Functional diarrhea- (present on admission)  Assessment & Plan  Acute on chronic  Still pending stool studies  Labs does not appear to be c.diff  improved    DOROTHY (obstructive sleep apnea)- (present on admission)  Assessment & Plan  CPAP nocturnal    Pulmonary HTN (HCC)- (present on admission)  Assessment & Plan  2015 RVSP 65mmHg  New echo 40mmHg, improved  Cont oral lasix    Breast cancer (HCC)- (present on admission)  Assessment & Plan  Had resection 2021 and on tamoxifen, continue medication    Anemia- (present on admission)  Assessment & Plan  Macrocytic anemia, due to ESRD and B12 deficiency.  Pt denied GIB symptoms.  Ferritin 509.  Iron sat 23%.  B12 levels 227, low.  Gave IM. Continue PO.  Monitor labs on HD days while hospitalized    Hypothyroid- (present on admission)  Assessment & Plan  Cont synthroid    Type 2 diabetes mellitus with hyperglycemia (HCC)- (present on admission)  Assessment & Plan  ISS ordered  Gabapentin 300mg max daily    Congestive heart failure (HCC)- (present on admission)  Assessment & Plan  Chronic (HFpEF) diastolic dysfunction  No recent TTE. Given possible syncope on Saturday    New echo LVEF 65%, RVSP 40mmHg, grade 2 diastolic dysfunction  Continue coreg, norvasc and lasix  stable    HTN (hypertension)- (present on admission)  Assessment & Plan  Cont coreg, norvasc and lasix     VTE  prophylaxis: heparin ppx    I have performed a physical exam and reviewed and updated ROS and Plan today (10/2/2022). In review of yesterday's note (10/1/2022), there are no changes except as documented above.

## 2022-10-02 NOTE — PROGRESS NOTES
Received bedside patient report from DENNIS Tucker. Patient resting comfortably in bed, no complaints at this time. Safety precautions in place. Will continue to monitor.

## 2022-10-03 VITALS
DIASTOLIC BLOOD PRESSURE: 50 MMHG | OXYGEN SATURATION: 94 % | HEIGHT: 66 IN | WEIGHT: 253.09 LBS | RESPIRATION RATE: 18 BRPM | BODY MASS INDEX: 40.67 KG/M2 | TEMPERATURE: 97.8 F | SYSTOLIC BLOOD PRESSURE: 131 MMHG | HEART RATE: 73 BPM

## 2022-10-03 LAB
GLUCOSE BLD STRIP.AUTO-MCNC: 139 MG/DL (ref 65–99)
GLUCOSE BLD STRIP.AUTO-MCNC: 143 MG/DL (ref 65–99)
GLUCOSE BLD STRIP.AUTO-MCNC: 306 MG/DL (ref 65–99)
SARS-COV+SARS-COV-2 AG RESP QL IA.RAPID: NOTDETECTED
SPECIMEN SOURCE: NORMAL

## 2022-10-03 PROCEDURE — A9270 NON-COVERED ITEM OR SERVICE: HCPCS | Performed by: INTERNAL MEDICINE

## 2022-10-03 PROCEDURE — 99239 HOSP IP/OBS DSCHRG MGMT >30: CPT | Performed by: INTERNAL MEDICINE

## 2022-10-03 PROCEDURE — 82962 GLUCOSE BLOOD TEST: CPT

## 2022-10-03 PROCEDURE — 700111 HCHG RX REV CODE 636 W/ 250 OVERRIDE (IP): Performed by: STUDENT IN AN ORGANIZED HEALTH CARE EDUCATION/TRAINING PROGRAM

## 2022-10-03 PROCEDURE — 90662 IIV NO PRSV INCREASED AG IM: CPT | Performed by: INTERNAL MEDICINE

## 2022-10-03 PROCEDURE — 700111 HCHG RX REV CODE 636 W/ 250 OVERRIDE (IP): Performed by: INTERNAL MEDICINE

## 2022-10-03 PROCEDURE — 90935 HEMODIALYSIS ONE EVALUATION: CPT

## 2022-10-03 PROCEDURE — 3E02340 INTRODUCTION OF INFLUENZA VACCINE INTO MUSCLE, PERCUTANEOUS APPROACH: ICD-10-PCS | Performed by: INTERNAL MEDICINE

## 2022-10-03 PROCEDURE — 94660 CPAP INITIATION&MGMT: CPT

## 2022-10-03 PROCEDURE — 97116 GAIT TRAINING THERAPY: CPT

## 2022-10-03 PROCEDURE — 87426 SARSCOV CORONAVIRUS AG IA: CPT

## 2022-10-03 PROCEDURE — 90471 IMMUNIZATION ADMIN: CPT

## 2022-10-03 PROCEDURE — 97530 THERAPEUTIC ACTIVITIES: CPT

## 2022-10-03 PROCEDURE — 700102 HCHG RX REV CODE 250 W/ 637 OVERRIDE(OP): Performed by: INTERNAL MEDICINE

## 2022-10-03 PROCEDURE — 94760 N-INVAS EAR/PLS OXIMETRY 1: CPT

## 2022-10-03 RX ORDER — LANOLIN ALCOHOL/MO/W.PET/CERES
100 CREAM (GRAM) TOPICAL DAILY
Status: SHIPPED
Start: 2022-10-04

## 2022-10-03 RX ORDER — TAMOXIFEN CITRATE 20 MG/1
20 TABLET ORAL DAILY
Qty: 60 TABLET | Refills: 3 | Status: SHIPPED
Start: 2022-10-04

## 2022-10-03 RX ORDER — CARVEDILOL 12.5 MG/1
12.5 TABLET ORAL 2 TIMES DAILY WITH MEALS
Qty: 60 TABLET | Status: SHIPPED
Start: 2022-10-03

## 2022-10-03 RX ORDER — LANOLIN ALCOHOL/MO/W.PET/CERES
400 CREAM (GRAM) TOPICAL 2 TIMES DAILY
Qty: 30 TABLET | Status: SHIPPED
Start: 2022-10-03

## 2022-10-03 RX ORDER — FUROSEMIDE 80 MG
80 TABLET ORAL DAILY
Qty: 30 TABLET | Status: SHIPPED
Start: 2022-10-04

## 2022-10-03 RX ADMIN — GABAPENTIN 300 MG: 300 CAPSULE ORAL at 05:37

## 2022-10-03 RX ADMIN — MAGNESIUM GLUCONATE 500 MG ORAL TABLET 400 MG: 500 TABLET ORAL at 05:38

## 2022-10-03 RX ADMIN — EPOETIN ALFA-EPBX 6000 UNITS: 3000 INJECTION, SOLUTION INTRAVENOUS; SUBCUTANEOUS at 08:51

## 2022-10-03 RX ADMIN — THIAMINE HCL TAB 100 MG 100 MG: 100 TAB at 05:37

## 2022-10-03 RX ADMIN — HEPARIN SODIUM 3300 UNITS: 1000 INJECTION, SOLUTION INTRAVENOUS; SUBCUTANEOUS at 10:10

## 2022-10-03 RX ADMIN — FUROSEMIDE 80 MG: 40 TABLET ORAL at 05:38

## 2022-10-03 RX ADMIN — CYANOCOBALAMIN TAB 500 MCG 1000 MCG: 500 TAB at 05:38

## 2022-10-03 RX ADMIN — AMITRIPTYLINE HYDROCHLORIDE 10 MG: 10 TABLET, FILM COATED ORAL at 05:38

## 2022-10-03 RX ADMIN — LEVOTHYROXINE SODIUM 100 MCG: 0.1 TABLET ORAL at 05:38

## 2022-10-03 RX ADMIN — TAMOXIFEN CITRATE 20 MG: 10 TABLET ORAL at 05:38

## 2022-10-03 RX ADMIN — AMLODIPINE BESYLATE 5 MG: 5 TABLET ORAL at 05:38

## 2022-10-03 RX ADMIN — INFLUENZA A VIRUS A/VICTORIA/2570/2019 IVR-215 (H1N1) ANTIGEN (FORMALDEHYDE INACTIVATED), INFLUENZA A VIRUS A/DARWIN/9/2021 SAN-010 (H3N2) ANTIGEN (FORMALDEHYDE INACTIVATED), INFLUENZA B VIRUS B/PHUKET/3073/2013 ANTIGEN (FORMALDEHYDE INACTIVATED), AND INFLUENZA B VIRUS B/MICHIGAN/01/2021 ANTIGEN (FORMALDEHYDE INACTIVATED) 0.7 ML: 60; 60; 60; 60 INJECTION, SUSPENSION INTRAMUSCULAR at 15:53

## 2022-10-03 ASSESSMENT — COGNITIVE AND FUNCTIONAL STATUS - GENERAL
MOBILITY SCORE: 18
SUGGESTED CMS G CODE MODIFIER MOBILITY: CK
STANDING UP FROM CHAIR USING ARMS: A LITTLE
CLIMB 3 TO 5 STEPS WITH RAILING: A LOT
MOVING TO AND FROM BED TO CHAIR: A LITTLE
WALKING IN HOSPITAL ROOM: A LITTLE
MOVING FROM LYING ON BACK TO SITTING ON SIDE OF FLAT BED: A LITTLE

## 2022-10-03 ASSESSMENT — GAIT ASSESSMENTS
DEVIATION: STEP TO
DISTANCE (FEET): 60
GAIT LEVEL OF ASSIST: CONTACT GUARD ASSIST
ASSISTIVE DEVICE: FRONT WHEEL WALKER

## 2022-10-03 ASSESSMENT — ENCOUNTER SYMPTOMS
HEADACHES: 0
WEAKNESS: 0
ABDOMINAL PAIN: 0
CHILLS: 0
DIARRHEA: 0

## 2022-10-03 NOTE — DISCHARGE SUMMARY
"Discharge Summary    CHIEF COMPLAINT ON ADMISSION  No chief complaint on file.      Reason for Admission  Failure to thrive    Admission Date  9/26/2022     Discharge Date 10/03/2022    CODE STATUS  DNAR/DNI    HPI & HOSPITAL COURSE    As per admitting Hospitalist Dr. Rivera:   \"Germaine Chaparro is a 77 y.o. female who presented 9/26/2022 with weakness. She has h/o HFpEF, DM, HTN, pulm HTN, DOROTHY who was recently started on dialysis 9/11 at [facility] for renal failure. She was discharged home and patient felt she was discharged too early. She says she's been weak. She perks up after dialysis but on Saturday she felt very weak and slid off a chair where her son found her. She doesn't remember falling, may have passed out. She has had issues with hypoglycemia after hospitalization.  She went to [facility] and found with mild hypoMg 1.6 and hypophos 1.9 that was repleted orally. It was discussed with Dr. Verdin who said she might need long-term placement. She was transferred because they do not have dialysis. Family had requested [facility] on transfer.  Currently she feels weak and \"spacey.\" Denies CP, SOB, dizziness, abd pain, nausea. She says her edema is improved from her baseline.\"    Upon speaking with patient's son, who lives in [facility], there was concern patient had been prescribed insulin and was too much for Ms. Chaparro. He reported a glucose level of 30 after following instructions from [facility].  Patient was not feeling well at home just one day after leaving from [facility].    Patient did complain of ongoing chronic loose stools at home. Stool cultures were obtained but unfortunately were not analyzed correctly. Would recommend to repeat stool cultures at SNF or PCP office if loose stools continue, also recommend gastroenterologist outpatient for evaluation.    Here, patient did well with hemodialysis here, she may need a maximum of 2L at a session, they reported having weakness and lower BP when " [facility] tried 3L sessions.    Patient was accepted to [facility], Monday, Wednesday, Friday @ 1:30 PM. Patient to start Wed 10/05 at 1:00 PM for first appointment.     New echo LVEF 65%, RVSP 40mmHg, grade 2 diastolic dysfunction. Ms. Chaparro did not present with acute CHF exacerbation.  She is continued on po lasix and HD.    For patient's diabetes, we have stopped her insulin glargine. She can continue on ISS if needed.  She may have had complications with metformin, will HOLD HOME METFORMIN 500mg PO BID.     Therefore, she is discharged in fair and stable condition to skilled nursing facility.    The patient met 2-midnight criteria for an inpatient stay at the time of discharge.      FOLLOW UP ITEMS POST DISCHARGE  With PCP, nephrologist    DISCHARGE DIAGNOSES  Principal Problem:    Debility POA: Yes  Active Problems:    End stage renal failure on dialysis (HCC) POA: Yes    HTN (hypertension) POA: Yes    Congestive heart failure (HCC) POA: Yes    Type 2 diabetes mellitus with hyperglycemia (HCC) POA: Yes    Hypothyroid POA: Yes    Anemia POA: Yes    Breast cancer (HCC) POA: Yes    Pulmonary HTN (HCC) POA: Yes    DOROTHY (obstructive sleep apnea) POA: Yes    Functional diarrhea POA: Yes  Resolved Problems:    * No resolved hospital problems. *      FOLLOW UP  No future appointments.  J Timothy Lombard, M.D.  [address] [facility] [id number] [phone]    Call  As needed    [facility] NE[facility]  [address] [place] [id number] [phone]  Follow up  As needed, If symptoms worsen      MEDICATIONS ON DISCHARGE     Medication List        START taking these medications        Instructions   carvedilol 12.5 MG Tabs  Commonly known as: COREG   Take 1 Tablet by mouth 2 times a day with meals.  Dose: 12.5 mg     cyanocobalamin 1000 MCG Tabs  Start taking on: October 4, 2022  Commonly known as: VITAMIN B12   Take 1 Tablet by mouth every day.  Dose: 1,000 mcg      furosemide 80 MG Tabs  Start taking on: October 4, 2022  Commonly known as: LASIX   Take 1 Tablet by mouth every day.  Dose: 80 mg     insulin regular 100 Unit/mL Soln  Commonly known as: HumuLIN R   Inject 1-6 Units under the skin 4 Times a Day,Before Meals and at Bedtime.  Dose: 1-6 Units     magnesium oxide 400 (240 Mg) MG Tabs   Take 1 Tablet by mouth 2 times a day.  Dose: 400 mg     tamoxifen 20 MG tablet  Start taking on: October 4, 2022  Commonly known as: NOLVADEX   Take 1 Tablet by mouth every day.  Dose: 20 mg     thiamine 100 MG tablet  Start taking on: October 4, 2022  Commonly known as: THIAMINE   Take 1 Tablet by mouth every day.  Dose: 100 mg            CONTINUE taking these medications        Instructions   acetaminophen 325 MG Tabs  Commonly known as: Tylenol   Take 3 Tabs by mouth every 8 hours. Transition to as needed when off of oxycodone  Dose: 975 mg     amitriptyline 10 MG Tabs  Commonly known as: ELAVIL   Take 2 Tabs by mouth every bedtime.  Dose: 20 mg     amLODIPine 5 MG Tabs  Commonly known as: NORVASC   Take 1 Tab by mouth every bedtime.  Dose: 5 mg     atorvastatin 20 MG Tabs  Commonly known as: LIPITOR   Take 1 Tab by mouth every bedtime.  Dose: 20 mg     budesonide-formoterol 80-4.5 MCG/ACT Aero  Commonly known as: SYMBICORT   Inhale 2 Puffs by mouth 2 Times a Day.  Dose: 2 Puff     Cholecalciferol 100 MCG (4000 UT) Tabs   Take 4,000 Units by mouth every day.  Dose: 4,000 Units     gabapentin 300 MG Caps  Commonly known as: NEURONTIN   Take 1-2 Caps by mouth every bedtime. 300 mg morning and mid day, 600 mg bedtime  Dose: 300-600 mg     levothyroxine 175 MCG Tabs  Commonly known as: SYNTHROID   Take 175 mcg by mouth Every morning on an empty stomach.  Dose: 175 mcg     SITagliptin 100 MG Tabs  Commonly known as: JANUVIA   Take 100 mg by mouth every morning.  Dose: 100 mg            STOP taking these medications      insulin glargine 100 UNIT/ML Soln  Commonly known as: Sukhwinder    "  metFORMIN 500 MG Tabs  Commonly known as: GLUCOPHAGE     metoprolol  MG Tb24  Commonly known as: TOPROL XL              Allergies  Allergies   Allergen Reactions    Codeine Itching     \"My head itches.\"    Tape Rash     Paper Tape ok       DIET  Orders Placed This Encounter   Procedures    Diet Order Diet: Renal     Standing Status:   Standing     Number of Occurrences:   1     Order Specific Question:   Diet:     Answer:   Renal [8]       ACTIVITY  As tolerated and directed by skilled nursing.  Weight bearing as tolerated    LINES, DRAINS, AND WOUNDS  This is an automated list. Peripheral IVs will be removed prior to discharge.  Peripheral IV 09/27/22 20 G Anterior;Proximal;Right Forearm (Active)   Site Assessment Clean;Dry;Intact 10/03/22 1200   Dressing Type Transparent 10/03/22 1200   Line Status Scrubbed the hub prior to access;Flushed;Saline locked 10/03/22 1200   Dressing Status Clean;Dry;Intact 10/03/22 1200   Dressing Intervention N/A 10/03/22 1200   Dressing Change Due 10/01/22 09/29/22 2000   Infiltration Grading ([facility], [facility]) 0 10/03/22 1200   Phlebitis Scale ([facility] Only) 0 10/03/22 1200     External Urinary Catheter (Female Wick) (Active)   Collection Container Suction container 10/02/22 1955   Output (mL) 400 mL 10/02/22 1430       HD Catheter Double Lumen (Active)   Site Assessment Clean;Dry;Intact 10/03/22 1008   Red Lumen Status Flushed;Heparin locked;Capped 10/03/22 1008   Blue Lumen Status Flushed;Heparin locked;Capped 10/03/22 1008   Site Prep Alcohol 10/03/22 1008   Treatment Performed Dialysis 10/03/22 1008   Line Care Cap replaced;Connections replaced and tightened 10/03/22 1008   Dressing Type Biopatch;Occlusive;Transparent 10/03/22 1008   Dressing Status Clean;Dry;Intact 10/03/22 1008   Dressing Intervention N/A 10/03/22 1008   Dressing Change Due 10/08/22 10/03/22 1008      Peripheral IV 09/27/22 20 G Anterior;Proximal;Right Forearm (Active)   Site Assessment Clean;Dry;Intact " 10/03/22 1200   Dressing Type Transparent 10/03/22 1200   Line Status Scrubbed the hub prior to access;Flushed;Saline locked 10/03/22 1200   Dressing Status Clean;Dry;Intact 10/03/22 1200   Dressing Intervention N/A 10/03/22 1200   Dressing Change Due 10/01/22 09/29/22 2000   Infiltration Grading ([facility], [facility]) 0 10/03/22 1200   Phlebitis Scale ([facility] Only) 0 10/03/22 1200               MENTAL STATUS ON TRANSFER   At baseline, pleasant, cooperative    CONSULTATIONS  [facility]      PROCEDURES  Hemodialysis    LABORATORY  Lab Results   Component Value Date    SODIUM 143 09/30/2022    POTASSIUM 4.1 09/30/2022    CHLORIDE 103 09/30/2022    CO2 27 09/30/2022    GLUCOSE 132 (H) 09/30/2022    BUN 31 (H) 09/30/2022    CREATININE 5.36 (HH) 09/30/2022        Lab Results   Component Value Date    WBC 7.6 09/30/2022    HEMOGLOBIN 8.4 (L) 09/30/2022    HEMATOCRIT 27.4 (L) 09/30/2022    PLATELETCT 196 09/30/2022        Total time of the discharge process exceeds 37 minutes.  More than 50% of time was spent face to face with patient.  This included but not limited to review of hospital course with patient, treatment goals upon discharge, recommendations to PCP, continued and new medications and their adverse reactions, and nursing instructions for patient.

## 2022-10-03 NOTE — CARE PLAN
The patient is Stable - Low risk of patient condition declining or worsening    Shift Goals  Clinical Goals: Free from falls or injuries, Rest and sleep at least 4 hours  Patient Goals: Free from falls or injuries, Rest and sleep at least 4 hours    Progress made toward(s) clinical / shift goals: No falls or injuries, Rested and slept for over 4 hours    Patient is not progressing towards the following goals:

## 2022-10-03 NOTE — DISCHARGE PLANNING
Case Management Discharge Planning    Admission Date: 9/26/2022  GMLOS: 3.8  ALOS: 7    6-Clicks ADL Score: 15  6-Clicks Mobility Score: 20  PT and/or OT Eval ordered: Yes  Post-acute Referrals Ordered: Yes  Post-acute Choice Obtained: Yes  Has referral(s) been sent to post-acute provider:  Yes      Anticipated Discharge Dispo: Discharge Disposition: D/T to SNF with Medicare cert in anticipation of skilled care (03)    DME Needed: No    Action(s) Taken: Called dialysis Coordinator Xitlaly Reyes. No answer at this time, left .     PASRR: [id number]    Per Zohreh, pt confirmed for dialysis chair at [facility] at 1330, first appt is on 10/05 at 1300.     RNCM notified Madison from [facility] that pt's dialysis chair is confirmed and first appt in this Wednesday 10/05. Per Madison, they can admit pt today at 1600 via their WC van. RNCM notified Dilshad GODINEZ, Valeria RN, and pt.    Escalations Completed: None    Medically Clear: Yes    Next Steps: f/u with Zohreh regarding dialysis chair confirmation    Barriers to Discharge: Pending Placement and Dialysis

## 2022-10-03 NOTE — THERAPY
Physical Therapy   Daily Treatment     Patient Name: Germaine Chaparro  Age:  77 y.o., Sex:  female  Medical Record #: [id number]  Today's Date: 10/3/2022     Precautions  Precautions: (P) Fall Risk    Assessment    Pt is progressing slower than expected today. Pt was primarily limited due to fatigue, dizziness, and lightheadedness during upright mobility. Pt was only able to ambulate about 50-60 ft today and required seated rest breaks throughout due to dizziness/lightheadedness. Pt demonstrated with appropriate vitals. During return back to room pt had bowel movement as she was ambulating and was assisted to toilet with Min A for transfer. CNA was able to assist with stacey care. Pt was then ambulate ambulate from toilet to bed with CGA, however, continued to report of dizziness. Pt will continue to benefit from skilled PT while in house. RN aware of concerns. Recommend post-acute placement for continued physical therapy services prior to discharge home.       Plan    Continue current treatment plan.    DC Equipment Recommendations: (P) Unable to determine at this time  Discharge Recommendations: (P) Recommend post-acute placement for additional physical therapy services prior to discharge home     Objective       10/03/22 1612   Precautions   Precautions Fall Risk   Pain 0 - 10 Group   Therapist Pain Assessment Nurse Notified;0   Cognition    Level of Consciousness Alert   Passive ROM Lower Body   Passive ROM Lower Body WDL   Active ROM Lower Body    Active ROM Lower Body  WDL   Strength Lower Body   Lower Body Strength  X   Gross Strength Generalized Weakness, Equal Bilaterally   Balance   Sitting Balance (Static) Fair +   Sitting Balance (Dynamic) Fair   Standing Balance (Static) Fair   Standing Balance (Dynamic) Fair   Weight Shift Sitting Fair   Weight Shift Standing Fair   Skilled Intervention Verbal Cuing;Facilitation   Comments w/fww   Gait Analysis   Gait Level Of Assist Contact Guard Assist   Assistive  Device Front Wheel Walker   Distance (Feet) 60   # of Times Distance was Traveled 1   Deviation Step To   Weight Bearing Status full   Skilled Intervention Verbal Cuing;Postural Facilitation   Bed Mobility    Supine to Sit Modified Independent   Sit to Supine Modified Independent   Scooting Modified Independent   Skilled Intervention Verbal Cuing   Functional Mobility   Sit to Stand Contact Guard Assist   Bed, Chair, Wheelchair Transfer Contact Guard Assist   Toilet Transfers Minimal Assist   Transfer Method Stand Step   Mobility FWW   Skilled Intervention Verbal Cuing;Compensatory Strategies   Comments cues for handplacement during sit<>stands; requires compensatory stratgies in order to return demo safe transfer mechanics and sit<>stand mechanics   How much difficulty does the patient currently have...   Turning over in bed (including adjusting bedclothes, sheets and blankets)? 4   Sitting down on and standing up from a chair with arms (e.g., wheelchair, bedside commode, etc.) 3   Moving from lying on back to sitting on the side of the bed? 3   How much help from another person does the patient currently need...   Moving to and from a bed to a chair (including a wheelchair)? 3   Need to walk in a hospital room? 3   Climbing 3-5 steps with a railing? 2   6 clicks Mobility Score 18   Activity Tolerance   Sitting in Chair 5 mins x 2   Sitting Edge of Bed 5 mins   Standing 5 mins x 3   Comments limited due to dizziness and lightheadedness   Short Term Goals    Short Term Goal # 1 Pt will be able to perform bed mobility and sup <> sit Barbie in 6 visits.   Goal Outcome # 1 Goal met   Short Term Goal # 2 Pt will be able to perform sit <> stand and transfer with FWW Barbie in 6 visits.   Goal Outcome # 2 Progressing slower than expected   Short Term Goal # 3 Pt will be able to ambulate 150 ft with FWW Barbie in 6 visits.   Goal Outcome # 3 Progressing slower than expected   Education Group   Role of Physical Therapist Patient  Response Patient;Acceptance;Explanation;Demonstration;Verbal Demonstration;Action Demonstration   Anticipated Discharge Equipment and Recommendations   DC Equipment Recommendations Unable to determine at this time   Discharge Recommendations Recommend post-acute placement for additional physical therapy services prior to discharge home   Interdisciplinary Plan of Care Collaboration   IDT Collaboration with  Nursing   Patient Position at End of Therapy In Bed;Call Light within Reach;Tray Table within Reach;Phone within Reach   Collaboration Comments aware of visit and recs   Session Information   Date / Session Number  10/3-3 (3/4, 10/4)

## 2022-10-03 NOTE — PROGRESS NOTES
"[facility] -  PROGRESS NOTE               Author: Marii Arteaga M.D. Date & Time: 10/3/2022  10:26 AM     HPI:  Germaine Chaparro is a 77 y.o. female who presented 9/26/2022 with weakness. She has h/o HFpEF, DM, HTN, pulm HTN, DOROTHY who was recently started on dialysis 9/11 at [facility].  Since discharge she's been weak.  She slid off a chair where her son found her.  Has had issues with hypoglycemia after hospitalization.  She was at [facility] and found with mild hypoMg 1.6 and hypophos 1.9 that was repleted.  Now here for possible placement.      DAILY NEPHROLOGY SUMMARY:  See note from 9/30 for prior summaries  10/01: NAEO, no complaints, feels good, HD well this AM with nml flow in catheter  10/02: NAEO, son at bedside, feels better, sitting in chair    REVIEW OF SYSTEMS:    10 point ROS reviewed and is as per HPI/daily summary or otherwise negative    PMH/PSH/SH/FH:   Reviewed and unchanged since admission note    CURRENT MEDICATIONS:   Reviewed from admission to present day    VS:  /57   Pulse (!) 58   Temp 36.7 °C (98 °F) (Oral)   Resp 18   Ht 1.676 m (5' 6\")   Wt 115 kg (253 lb 1.4 oz)   SpO2 95%   BMI 40.85 kg/m²     Physical Exam  Nursing note reviewed.   Constitutional:       Appearance: Normal appearance.   Eyes:      General: No scleral icterus.  Cardiovascular:      Comments: BLE edema  Pulmonary:      Effort: Pulmonary effort is normal.   Abdominal:      General: There is no distension.   Musculoskeletal:         General: No deformity.   Skin:     Findings: No rash.   Neurological:      Mental Status: She is alert.   Psychiatric:         Behavior: Behavior normal.      Fluids:  In: 720 [P.O.:720]  Out: 400     LABS:        IMAGING:   All imaging reviewed from admission to present day    IMPRESSION:  # JEANINE vs ESRD    - Now dialysis dependent  # Weakness    - Management per primary team  # DOROTHY  # Pulmonary HTN  # Breast Cancer  # HTN  - Goal BP < " 140/90  - At goal  # Anemia of CKD  - Goal Hgb 10-11  - Epo with HD  # CKD-MBD  - PTH pen  - Vit D pen  - Ca 8.5  - PO4 2.5  # type 2 DM  # Hypothyroidism     PLAN:  - MWF iHD  - UF as tolerated  - Epo with HD  - Do not exceed gabapentin 300mg daily in ESRD patient  - UF as tolerated  - No dietary protein or phos restrictions  - Dose all meds per ESRD  - Awaiting transfer to [facility] next week likely

## 2022-10-03 NOTE — PROGRESS NOTES
Report called to Laura COLLINS at [facility]. Discharge instructions reviewed with patient. IV site removed. Patient being sent with personal CPAP machine and all belongings. Influenza vaccine administered. Patient off the unit via medical transport on 3L nasal cannula.

## 2022-10-03 NOTE — PROGRESS NOTES
"Hospital Medicine Daily Progress Note    Date of Service  10/3/2022    Chief Complaint  Germaine Chaparro is a 77 y.o. female admitted 9/26/2022 with ongoing weakness    Hospital Course  As per admitting Hospitalist Dr. Rivera:   \"Germaine Chaparro is a 77 y.o. female who presented 9/26/2022 with weakness. She has h/o HFpEF, DM, HTN, pulm HTN, DOROTHY who was recently started on dialysis 9/11 at [facility] for renal failure. She was discharged home and patient felt she was discharged too early. She says she's been weak. She perks up after dialysis but on Saturday she felt very weak and slid off a chair where her son found her. She doesn't remember falling, may have passed out. She has had issues with hypoglycemia after hospitalization.  She went to [facility] and found with mild hypoMg 1.6 and hypophos 1.9 that was repleted orally. It was discussed with Dr. Verdin who said she might need long-term placement. She was transferred because they do not have dialysis. Family had requested [facility] on transfer.  Currently she feels weak and \"spacey.\" Denies CP, SOB, dizziness, abd pain, nausea. She says her edema is improved from her baseline.\"    Interval Problem Update  Patient had no acute complaints today, received HD today 2L removed, patient felt good no weakness.  Pending HD outpatient chair, holding up discharge to [facility].    I have discussed this patient's plan of care and discharge plan at IDT rounds today with Case Management, Nursing, Nursing leadership, and other members of the IDT team.    Consultants/Specialty  nephrology    Code Status  DNAR/DNI    Disposition  Patient is medically cleared pending HD chair approval  for discharge.   Anticipate discharge to to skilled nursing facility [facility]  I have placed the appropriate orders for post-discharge needs.    Review of Systems  Review of Systems   Constitutional:  Negative for chills and malaise/fatigue.   Gastrointestinal:  Negative for abdominal pain and " diarrhea.   Neurological:  Negative for weakness and headaches.   All other systems reviewed and are negative.     Physical Exam  Temp:  [36.6 °C (97.8 °F)-37.2 °C (98.9 °F)] 36.6 °C (97.8 °F)  Pulse:  [58-73] 73  Resp:  [18-20] 18  BP: (123-144)/(38-57) 131/50  SpO2:  [90 %-95 %] 94 %    Physical Exam  Vitals and nursing note reviewed.   Constitutional:       General: She is not in acute distress.     Appearance: She is obese.      Comments: Frail appearing elderly female   Cardiovascular:      Rate and Rhythm: Normal rate and regular rhythm.      Pulses: Normal pulses.      Heart sounds: Normal heart sounds. No murmur heard.  Pulmonary:      Effort: Pulmonary effort is normal. No respiratory distress.      Breath sounds: Normal breath sounds.   Abdominal:      General: Bowel sounds are normal. There is no distension.      Tenderness: There is no abdominal tenderness.   Musculoskeletal:         General: No tenderness.      Right lower leg: No edema.      Left lower leg: No edema.      Comments: Sarcopenic. Bilateral hand muscle atrophy noted.   Skin:     Capillary Refill: Capillary refill takes less than 2 seconds.      Coloration: Skin is not jaundiced.      Findings: No erythema.   Neurological:      Mental Status: She is alert and oriented to person, place, and time. Mental status is at baseline.      Motor: Weakness present.   Psychiatric:         Mood and Affect: Mood normal.         Behavior: Behavior normal.       Fluids    Intake/Output Summary (Last 24 hours) at 10/3/2022 1232  Last data filed at 10/3/2022 1200  Gross per 24 hour   Intake 740 ml   Output 3500 ml   Net -2760 ml       Laboratory                            Imaging  EC-ECHOCARDIOGRAM COMPLETE W/O CONT   Final Result      OUTSIDE IMAGES-CT HEAD   Final Result           Assessment/Plan  * Debility- (present on admission)  Assessment & Plan  Weakness after hospital stay and starting dialysis  Has macrocytic anemia  Pending [facility] snf  Feels  better after IM B12  More ambulatory with walker    End stage renal failure on dialysis (HCC)- (present on admission)  Assessment & Plan  HD MWF  consulted Dr. Verdin on admission  Will need outpatient HD. [facility] needs T/TH/S schedule at [facility].    received HD today 2L removed, patient felt good no weakness.    Functional diarrhea- (present on admission)  Assessment & Plan  Acute on chronic  Still pending stool studies  Labs does not appear to be c.diff  improved    DOROTHY (obstructive sleep apnea)- (present on admission)  Assessment & Plan  CPAP nocturnal    Pulmonary HTN (HCC)- (present on admission)  Assessment & Plan  2015 RVSP 65mmHg  New echo 40mmHg, improved  Cont oral lasix    Breast cancer (HCC)- (present on admission)  Assessment & Plan  Had resection 2021 and on tamoxifen, continue medication    Anemia- (present on admission)  Assessment & Plan  Macrocytic anemia, due to ESRD and B12 deficiency.  Pt denied GIB symptoms.  Ferritin 509.  Iron sat 23%.  B12 levels 227, low.  Gave IM. Continue PO.  Monitor labs on HD days while hospitalized    Hypothyroid- (present on admission)  Assessment & Plan  Cont synthroid    Type 2 diabetes mellitus with hyperglycemia (HCC)- (present on admission)  Assessment & Plan  ISS ordered  Gabapentin 300mg max daily    Congestive heart failure (HCC)- (present on admission)  Assessment & Plan  Chronic (HFpEF) diastolic dysfunction  No recent TTE. Given possible syncope on Saturday    New echo LVEF 65%, RVSP 40mmHg, grade 2 diastolic dysfunction  Continue coreg, norvasc and lasix  stable    HTN (hypertension)- (present on admission)  Assessment & Plan  Cont coreg, norvasc and lasix     VTE prophylaxis: heparin ppx    I have performed a physical exam and reviewed and updated ROS and Plan today (10/3/2022). In review of yesterday's note (10/2/2022), there are no changes except as documented above.

## 2022-10-03 NOTE — PROGRESS NOTES
[facility] Progress Note       HD ordered by Dr. Post. Treatment started at 0708 and ended at 1008.    Net UF removed: 2000mL.     Pt tolerated treatment well with no issues. See flow sheet for details. CVC patent, locked with Heparin 1:1000 units; 1.6 mL to RED port and 1.7 mL to BLUE port post dialysis. No s/s of infection present. Dressing in place, CDI. Report given to SCAR Rocha RN.

## 2022-10-03 NOTE — CARE PLAN
The patient is Stable - Low risk of patient condition declining or worsening    Shift Goals  Clinical Goals: Discharge placement  Patient Goals: Discharge    Progress made toward(s) clinical / shift goals:    Problem: Knowledge Deficit - Standard  Goal: Patient and family/care givers will demonstrate understanding of plan of care, disease process/condition, diagnostic tests and medications  Outcome: Progressing  Note: Patient's knowledge of plan of care, diagnostics, and medications regularly assessed. RN answers patient questions appropriately, education provided. Patient verbalizes better understanding of their condition.       Problem: Diabetes Management  Goal: Patient will achieve and maintain glucose in satisfactory range  Outcome: Progressing       Patient is not progressing towards the following goals:

## 2022-10-03 NOTE — THERAPY
Missed Therapy     Patient Name: Germaine Chaparro  Age:  77 y.o., Sex:  female  Medical Record #: [id number]  Today's Date: 10/3/2022    Discussed missed therapy with RN       10/03/22 0946   Interdisciplinary Plan of Care Collaboration   IDT Collaboration with  Nursing   Collaboration Comments Attempted therapy txt, however, pt is currently in HD. Will re-attempt as able.

## 2022-10-03 NOTE — PROGRESS NOTES
"[facility] -  PROGRESS NOTE               Author: Mary Post M.D. Date & Time: 10/3/2022  10:07 AM     HPI:  Germaine Chaparro is a 77 y.o. female who presented 9/26/2022 with weakness. She has h/o HFpEF, DM, HTN, pulm HTN, DOROTHY who was recently started on dialysis 9/11 at [facility].  Since discharge she's been weak.  She slid off a chair where her son found her.  Has had issues with hypoglycemia after hospitalization.  She was at [facility] and found with mild hypoMg 1.6 and hypophos 1.9 that was repleted.  Now here for possible placement.      DAILY NEPHROLOGY SUMMARY:  See note from 9/30 for prior summaries  10/01: NAEO, no complaints, feels good, HD well this AM with nml flow in catheter  10/03: No events, seen on dialysis this am, VSS--see dialysis treatment sheet for full details, no new complaints, was able to sit in chair most of yest afternoon    REVIEW OF SYSTEMS:    10 point ROS reviewed and is as per HPI/daily summary or otherwise negative    PMH/PSH/SH/FH:   Reviewed and unchanged since admission note    CURRENT MEDICATIONS:   Reviewed from admission to present day    VS:  /57   Pulse (!) 58   Temp 36.7 °C (98 °F) (Oral)   Resp 18   Ht 1.676 m (5' 6\")   Wt 115 kg (253 lb 1.4 oz)   SpO2 95%   BMI 40.85 kg/m²     Physical Exam  Nursing note reviewed.   Constitutional:       Appearance: Normal appearance.   HENT:      Head: Normocephalic and atraumatic.   Eyes:      General: No scleral icterus.  Cardiovascular:      Rate and Rhythm: Normal rate and regular rhythm.   Pulmonary:      Effort: Pulmonary effort is normal. No respiratory distress.      Breath sounds: Normal breath sounds.   Abdominal:      General: Bowel sounds are normal. There is no distension.      Palpations: Abdomen is soft.   Musculoskeletal:         General: No deformity.      Right lower leg: Edema present.      Left lower leg: Edema present.   Skin:     General: Skin is warm and " dry.      Findings: No rash.   Neurological:      General: No focal deficit present.      Mental Status: She is alert and oriented to person, place, and time.   Psychiatric:         Mood and Affect: Mood normal.         Behavior: Behavior normal.      Fluids:  In: 720 [P.O.:720]  Out: 400     LABS:        IMAGING:   All imaging reviewed from admission to present day    IMPRESSION:  # JEANINE vs ESRD    - Now dialysis a dependent  # Weakness    - Management per primary team  # DOROTHY  # Pulmonary HTN  # Breast Cancer  # HTN  - Goal BP < 140/90  - At goal  # Anemia of CKD  - Goal Hgb 10-11  - Epo with HD  # CKD-MBD  - PTH pen  - Vit D pen  - Ca 8.5  - PO4 2.5  # type 2 DM  # Hypothyroidism     PLAN:  - HD Today (MON), continue MWF iHD schedule  - UF as tolerated  - Epo with HD  - Transfuse PRN for Hgb less than 7  - Do not exceed gabapentin 300mg daily in ESRD patient  - UF as tolerated  - No dietary protein or phos restrictions  - no phos binders at this time  - Dose all meds per ESRD  - Awaiting transfer to [facility] next week likely

## 2022-10-03 NOTE — DISCHARGE PLANNING
Outpatient Dialysis Note:    Patient has been placed and confirmed at:    [facility]  [address]  [place] [address]    Phone: [phone]    Schedule: Monday, Wednesday, Friday   Time: 1:30 PM     Patient to start Wed 10/05 at 1:00 PM for first appointment.     Notification call placed to [facility] to relay confirmed placement.  Follow up message to Dr. Post to notify of placement.     Faxed dialysis welcome letter to MISTI Salazar who will provide it to the patient.     Xitlaly Reyes - Dialysis Coordinator   [facility]   [phone]

## 2022-10-05 LAB — LACTOFERRIN STL QL IA: NEGATIVE

## 2022-10-07 LAB — CALPROTECTIN STL-MCNT: 15 UG/G

## 2022-12-02 NOTE — THERAPY
"Physical Therapy Treatment completed.   Bed Mobility:  Supine to Sit: Moderate Assist  Transfers: Sit to Stand: Moderate Assist  Gait: Level Of Assist: Minimal Assist with Front-Wheel Walker       Plan of Care: Will benefit from Physical Therapy 5 times per week  Discharge Recommendations: Equipment: Will Continue to Assess for Equipment Needs. Post-acute therapy Recommend inpatient transitional care services for continued physical therapy services.      See \"Rehab Therapy-Acute\" Patient Summary Report for complete documentation.     Pt continues to present w/ decreased functional mobility. Pt presenting w/ generalized weakness requiring assist for all mobility. Pt requires ModA to get to standing, however was able to hold static standing balance. Pt able to walk short distances w/ cues for L quad contraction. Pt w/ significant weakness on the L LE requiring heavy reliance on UE's during gait. Pt educated on HEP to assist w/ weakness. As per initial eval, pt will benefit from post acute therapy.  " no

## (undated) DEVICE — GLOVE BIOGEL SZ 6.5 SURGICAL PF LTX (50PR/BX 4BX/CA)

## (undated) DEVICE — CLOSURE WOUND 1/4 X 4 (STERI - STRIP) (50/BX 4BX/CA)

## (undated) DEVICE — KIT EVACUATER 3 SPRING PVC LF 1/8 DRAIN SIZE (10EA/CA)"

## (undated) DEVICE — LACTATED RINGERS INJ. 500 ML - (24EA/CA)

## (undated) DEVICE — APPLICATOR COTTON TIP 6 IN - STERILE (2EA/PK 100PK/BX)

## (undated) DEVICE — LIGHT SOURCE MIS 12FT

## (undated) DEVICE — PACK JACKSON TABLE KIT W/OUT - HR (6EA/CA)

## (undated) DEVICE — SPONGE GAUZESTER 4 X 4 4PLY - (128PK/CA)

## (undated) DEVICE — KIT ANESTHESIA W/CIRCUIT & 3/LT BAG W/FILTER (20EA/CA)

## (undated) DEVICE — CANISTER SUCTION 3000ML MECHANICAL FILTER AUTO SHUTOFF MEDI-VAC NONSTERILE LF DISP  (40EA/CA)

## (undated) DEVICE — MIDAS LUBRICATOR DIFFUSER PACK (4EA/CA)

## (undated) DEVICE — ELECTRODE DUAL RETURN W/ CORD - (50/PK)

## (undated) DEVICE — PROTECTOR ULNA NERVE - (36PR/CA)

## (undated) DEVICE — KIT ROOM DECONTAMINATION

## (undated) DEVICE — BLADE SAW SMALL BONE AGGRESSIVE 31.0 X 9 X 0.38MM

## (undated) DEVICE — SET EXTENSION WITH 2 PORTS (48EA/CA) ***PART #2C8610 IS A SUBSTITUTE*****

## (undated) DEVICE — SUTURE 3-0 VICRYL PLUS RB-1 - 8 X 18 INCH (12/BX)

## (undated) DEVICE — HEADREST PRONEVIEW LARGE - (10/CA)

## (undated) DEVICE — SLEEVE, VASO, THIGH, MED

## (undated) DEVICE — SOD. CHL. INJ. 0.9% 1000 ML - (14EA/CA 60CA/PF)

## (undated) DEVICE — STERI STRIP COMPOUND BENZOIN - TINCTURE 0.6ML WITH APPLICATOR (40EA/BX)

## (undated) DEVICE — CELLSAVER STAT

## (undated) DEVICE — PACK NEURO - (2EA/CA)

## (undated) DEVICE — SYRINGE SAFETY 10 ML 18 GA X 1 1/2 BLUNT LL (100/BX 4BX/CA)

## (undated) DEVICE — SUTURE GENERAL

## (undated) DEVICE — SUCTION INSTRUMENT YANKAUER BULBOUS TIP W/O VENT (50EA/CA)

## (undated) DEVICE — ARMREST CRADLE FOAM - (2PR/PK 12PR/CA)

## (undated) DEVICE — GLOVE BIOGEL SZ 8.5 SURGICAL PF LTX - (50PR/BX 4BX/CA)

## (undated) DEVICE — NEEDLE NON-SAFETY HYPO 21 GA X 1 1/2 IN HYPO (100/BX)

## (undated) DEVICE — GLOVE BIOGEL PI INDICATOR SZ 8.5 SURGICAL PF LF - (50PR/BX 4BX/CA)

## (undated) DEVICE — CELLSAVER PACK

## (undated) DEVICE — LACTATED RINGERS INJ 1000 ML - (14EA/CA 60CA/PF)

## (undated) DEVICE — ELECTRODE 850 FOAM ADHESIVE - HYDROGEL RADIOTRNSPRNT (50/PK)

## (undated) DEVICE — TUBING C&T SET FLYING LEADS DRAIN TUBING (10EA/BX)

## (undated) DEVICE — SEALER BIPOLAR 2.3 AQUAMANTYS

## (undated) DEVICE — TUBE E-T HI-LO CUFF 7.5MM (10EA/PK)

## (undated) DEVICE — SUTURE 2-0 VICRYL PLUS CT-1 - 8 X 18 INCH(12/BX)

## (undated) DEVICE — TOOL DISSECT MATCH HEAD

## (undated) DEVICE — TUBE CONNECT SUCTION CLEAR 120 X 1/4" (50EA/CA)"

## (undated) DEVICE — GOWN SURGEONS X-LARGE - DISP. (30/CA)

## (undated) DEVICE — GLOVE, BIOGEL ECLIPSE, SZ 7.0, PF LTX (50/BX)

## (undated) DEVICE — GLOVE BIOGEL INDICATOR SZ 8.5 SURGICAL PF LTX - (50/BX 4BX/CA)

## (undated) DEVICE — GLOVE BIOGEL INDICATOR SZ 7SURGICAL PF LTX - (50/BX 4BX/CA)

## (undated) DEVICE — SUTURE 1 VICRYL PLUS CTX - 8 X 18 INCH (12/BX)

## (undated) DEVICE — BONE PRESS SPINAL EDITION HENSLER (10EA/CA)

## (undated) DEVICE — DRAPE STRLE REG TOWEL 18X24 - (10/BX 4BX/CA)"

## (undated) DEVICE — KIT SURGIFLO W/OUT THROMBIN - (6EA/CA)

## (undated) DEVICE — CHLORAPREP 26 ML APPLICATOR - ORANGE TINT(25/CA)

## (undated) DEVICE — SET LEADWIRE 5 LEAD BEDSIDE DISPOSABLE ECG (1SET OF 5/EA)

## (undated) DEVICE — SENSOR SPO2 NEO LNCS ADHESIVE (20/BX) SEE USER NOTES

## (undated) DEVICE — GOWN WARMING STANDARD FLEX - (30/CA)

## (undated) DEVICE — DRAPE LAPAROTOMY T SHEET - (12EA/CA)

## (undated) DEVICE — MASK ANESTHESIA ADULT  - (100/CA)

## (undated) DEVICE — INTRAOP NEURO IN OR 1:1 PER 15 MIN

## (undated) DEVICE — NEPTUNE 4 PORT MANIFOLD - (20/PK)

## (undated) DEVICE — GLOVE BIOGEL INDICATOR SZ 7.5 SURGICAL PF LTX - (50PR/BX 4BX/CA)

## (undated) DEVICE — NEEDLE NON SAFETY HYPO 22 GA X 1 1/2 IN (100/BX)

## (undated) DEVICE — BLANKET WARMING UPPER BODY - (10/CA)

## (undated) DEVICE — TUBING CLEARLINK DUO-VENT - C-FLO (48EA/CA)